# Patient Record
Sex: FEMALE | Race: WHITE | NOT HISPANIC OR LATINO | Employment: OTHER | URBAN - METROPOLITAN AREA
[De-identification: names, ages, dates, MRNs, and addresses within clinical notes are randomized per-mention and may not be internally consistent; named-entity substitution may affect disease eponyms.]

---

## 2018-02-13 ENCOUNTER — APPOINTMENT (OUTPATIENT)
Dept: GENERAL RADIOLOGY | Facility: HOSPITAL | Age: 78
End: 2018-02-13

## 2018-02-13 ENCOUNTER — HOSPITAL ENCOUNTER (EMERGENCY)
Facility: HOSPITAL | Age: 78
Discharge: HOME | End: 2018-02-13
Attending: EMERGENCY MEDICINE | Admitting: EMERGENCY MEDICINE

## 2018-02-13 ENCOUNTER — APPOINTMENT (OUTPATIENT)
Dept: CT IMAGING | Facility: HOSPITAL | Age: 78
End: 2018-02-13
Attending: EMERGENCY MEDICINE

## 2018-02-13 VITALS
SYSTOLIC BLOOD PRESSURE: 163 MMHG | DIASTOLIC BLOOD PRESSURE: 95 MMHG | HEIGHT: 66 IN | TEMPERATURE: 97.7 F | OXYGEN SATURATION: 94 % | BODY MASS INDEX: 28.59 KG/M2 | WEIGHT: 177.9 LBS | RESPIRATION RATE: 16 BRPM | HEART RATE: 84 BPM

## 2018-02-13 DIAGNOSIS — R01.1 HEART MURMUR: ICD-10-CM

## 2018-02-13 DIAGNOSIS — R42 DIZZY: Primary | ICD-10-CM

## 2018-02-13 DIAGNOSIS — N39.0 ACUTE UTI: ICD-10-CM

## 2018-02-13 DIAGNOSIS — I10 ESSENTIAL HYPERTENSION: ICD-10-CM

## 2018-02-13 LAB
ALBUMIN SERPL-MCNC: 3.9 G/DL (ref 3.5–5.2)
ALBUMIN/GLOB SERPL: 1.3 G/DL
ALP SERPL-CCNC: 78 U/L (ref 40–129)
ALT SERPL W P-5'-P-CCNC: 65 U/L (ref 5–33)
ANION GAP SERPL CALCULATED.3IONS-SCNC: 14.4 MMOL/L
AST SERPL-CCNC: 57 U/L (ref 5–32)
BACTERIA UR QL AUTO: ABNORMAL /HPF
BASOPHILS # BLD AUTO: 0.04 10*3/MM3 (ref 0–0.2)
BASOPHILS NFR BLD AUTO: 1.1 % (ref 0–2)
BILIRUB SERPL-MCNC: 0.7 MG/DL (ref 0.2–1.2)
BILIRUB UR QL STRIP: NEGATIVE
BUN BLD-MCNC: 12 MG/DL (ref 8–23)
BUN/CREAT SERPL: 16.9 (ref 7–25)
CALCIUM SPEC-SCNC: 9.1 MG/DL (ref 8.8–10.5)
CHLORIDE SERPL-SCNC: 101 MMOL/L (ref 98–107)
CLARITY UR: CLEAR
CO2 SERPL-SCNC: 23.6 MMOL/L (ref 22–29)
COLOR UR: YELLOW
CREAT BLD-MCNC: 0.71 MG/DL (ref 0.57–1)
DEPRECATED RDW RBC AUTO: 44.1 FL (ref 37–54)
EOSINOPHIL # BLD AUTO: 0.14 10*3/MM3 (ref 0.1–0.3)
EOSINOPHIL NFR BLD AUTO: 3.9 % (ref 0–4)
ERYTHROCYTE [DISTWIDTH] IN BLOOD BY AUTOMATED COUNT: 13.1 % (ref 11.5–14.5)
GFR SERPL CREATININE-BSD FRML MDRD: 80 ML/MIN/1.73
GLOBULIN UR ELPH-MCNC: 3.1 GM/DL
GLUCOSE BLD-MCNC: 103 MG/DL (ref 65–99)
GLUCOSE UR STRIP-MCNC: NEGATIVE MG/DL
HCT VFR BLD AUTO: 42.8 % (ref 37–47)
HGB BLD-MCNC: 14.3 G/DL (ref 12–16)
HGB UR QL STRIP.AUTO: NEGATIVE
HYALINE CASTS UR QL AUTO: ABNORMAL /LPF
IMM GRANULOCYTES # BLD: 0.03 10*3/MM3 (ref 0–0.03)
IMM GRANULOCYTES NFR BLD: 0.8 % (ref 0–0.5)
KETONES UR QL STRIP: NEGATIVE
LEUKOCYTE ESTERASE UR QL STRIP.AUTO: ABNORMAL
LYMPHOCYTES # BLD AUTO: 0.97 10*3/MM3 (ref 0.6–4.8)
LYMPHOCYTES NFR BLD AUTO: 26.7 % (ref 20–45)
MCH RBC QN AUTO: 30.4 PG (ref 27–31)
MCHC RBC AUTO-ENTMCNC: 33.4 G/DL (ref 31–37)
MCV RBC AUTO: 91.1 FL (ref 81–99)
MONOCYTES # BLD AUTO: 0.35 10*3/MM3 (ref 0–1)
MONOCYTES NFR BLD AUTO: 9.6 % (ref 3–8)
NEUTROPHILS # BLD AUTO: 2.1 10*3/MM3 (ref 1.5–8.3)
NEUTROPHILS NFR BLD AUTO: 57.9 % (ref 45–70)
NITRITE UR QL STRIP: NEGATIVE
NRBC BLD MANUAL-RTO: 0 /100 WBC (ref 0–0)
PH UR STRIP.AUTO: 7 [PH] (ref 4.5–8)
PLATELET # BLD AUTO: 93 10*3/MM3 (ref 140–500)
PMV BLD AUTO: 11.8 FL (ref 7.4–10.4)
POTASSIUM BLD-SCNC: 3.7 MMOL/L (ref 3.5–5.2)
PROT SERPL-MCNC: 7 G/DL (ref 6–8.5)
PROT UR QL STRIP: NEGATIVE
RBC # BLD AUTO: 4.7 10*6/MM3 (ref 4.2–5.4)
RBC # UR: ABNORMAL /HPF
RBC MORPH BLD: NORMAL
REF LAB TEST METHOD: ABNORMAL
SMALL PLATELETS BLD QL SMEAR: NORMAL
SODIUM BLD-SCNC: 139 MMOL/L (ref 136–145)
SP GR UR STRIP: 1.01 (ref 1–1.03)
SQUAMOUS #/AREA URNS HPF: ABNORMAL /HPF
TROPONIN T SERPL-MCNC: <0.01 NG/ML (ref 0–0.03)
UROBILINOGEN UR QL STRIP: ABNORMAL
WBC MORPH BLD: NORMAL
WBC NRBC COR # BLD: 3.63 10*3/MM3 (ref 4.8–10.8)
WBC UR QL AUTO: ABNORMAL /HPF

## 2018-02-13 PROCEDURE — 81001 URINALYSIS AUTO W/SCOPE: CPT | Performed by: EMERGENCY MEDICINE

## 2018-02-13 PROCEDURE — 85025 COMPLETE CBC W/AUTO DIFF WBC: CPT | Performed by: EMERGENCY MEDICINE

## 2018-02-13 PROCEDURE — 99284 EMERGENCY DEPT VISIT MOD MDM: CPT

## 2018-02-13 PROCEDURE — 87086 URINE CULTURE/COLONY COUNT: CPT | Performed by: EMERGENCY MEDICINE

## 2018-02-13 PROCEDURE — 84484 ASSAY OF TROPONIN QUANT: CPT | Performed by: EMERGENCY MEDICINE

## 2018-02-13 PROCEDURE — 85007 BL SMEAR W/DIFF WBC COUNT: CPT | Performed by: EMERGENCY MEDICINE

## 2018-02-13 PROCEDURE — 93005 ELECTROCARDIOGRAM TRACING: CPT | Performed by: EMERGENCY MEDICINE

## 2018-02-13 PROCEDURE — 80053 COMPREHEN METABOLIC PANEL: CPT | Performed by: EMERGENCY MEDICINE

## 2018-02-13 PROCEDURE — 70450 CT HEAD/BRAIN W/O DYE: CPT

## 2018-02-13 PROCEDURE — 99284 EMERGENCY DEPT VISIT MOD MDM: CPT | Performed by: EMERGENCY MEDICINE

## 2018-02-13 PROCEDURE — 71045 X-RAY EXAM CHEST 1 VIEW: CPT

## 2018-02-13 PROCEDURE — 93010 ELECTROCARDIOGRAM REPORT: CPT | Performed by: INTERNAL MEDICINE

## 2018-02-13 RX ORDER — CLONIDINE HYDROCHLORIDE 0.1 MG/1
0.1 TABLET ORAL 2 TIMES DAILY
COMMUNITY
End: 2018-04-03

## 2018-02-13 RX ORDER — AMLODIPINE BESYLATE 10 MG/1
5 TABLET ORAL DAILY
COMMUNITY

## 2018-02-13 RX ORDER — VALSARTAN 80 MG/1
160 TABLET ORAL
Status: DISCONTINUED | OUTPATIENT
Start: 2018-02-13 | End: 2018-02-13

## 2018-02-13 RX ORDER — VALSARTAN 160 MG/1
320 TABLET ORAL DAILY
COMMUNITY
End: 2018-04-03 | Stop reason: DRUGHIGH

## 2018-02-13 RX ORDER — CLONIDINE HYDROCHLORIDE 0.1 MG/1
0.1 TABLET ORAL ONCE
Status: COMPLETED | OUTPATIENT
Start: 2018-02-13 | End: 2018-02-13

## 2018-02-13 RX ORDER — VALSARTAN 80 MG/1
320 TABLET ORAL
Status: DISCONTINUED | OUTPATIENT
Start: 2018-02-13 | End: 2018-02-13 | Stop reason: HOSPADM

## 2018-02-13 RX ORDER — CEFUROXIME AXETIL 250 MG/1
250 TABLET ORAL 2 TIMES DAILY
Qty: 10 TABLET | Refills: 0 | Status: SHIPPED | OUTPATIENT
Start: 2018-02-13 | End: 2019-02-17

## 2018-02-13 RX ORDER — CEFUROXIME AXETIL 250 MG/1
250 TABLET ORAL ONCE
Status: COMPLETED | OUTPATIENT
Start: 2018-02-13 | End: 2018-02-13

## 2018-02-13 RX ORDER — AMLODIPINE BESYLATE 5 MG/1
10 TABLET ORAL
Status: DISCONTINUED | OUTPATIENT
Start: 2018-02-13 | End: 2018-02-13 | Stop reason: HOSPADM

## 2018-02-13 RX ADMIN — CEFUROXIME AXETIL 250 MG: 250 TABLET ORAL at 10:29

## 2018-02-13 RX ADMIN — CLONIDINE HYDROCHLORIDE 0.1 MG: 0.1 TABLET ORAL at 09:08

## 2018-02-13 RX ADMIN — AMLODIPINE BESYLATE 10 MG: 5 TABLET ORAL at 09:12

## 2018-02-13 NOTE — ED NOTES
Pt asked is she can take her DIOVAN from home.  Dr Marcelo confirmed that she can.  Provided water     Abby Boss RN  02/13/18 7287

## 2018-02-13 NOTE — DISCHARGE INSTRUCTIONS
Follow-up with Dr. De lValle within one week.  Return to emergency department if there is headache, increased dizziness, difficulty swallowing or speaking, chest pain, shortness of breath, abdominal pain, numbness, tingling, weakness, change in bladder or bowel function, fever, chills, worse in any way at all.

## 2018-02-15 LAB — BACTERIA SPEC AEROBE CULT: NO GROWTH

## 2018-03-13 ENCOUNTER — TRANSCRIBE ORDERS (OUTPATIENT)
Dept: ADMINISTRATIVE | Facility: HOSPITAL | Age: 78
End: 2018-03-13

## 2018-03-13 DIAGNOSIS — R01.1 MURMUR: Primary | ICD-10-CM

## 2018-03-19 ENCOUNTER — HOSPITAL ENCOUNTER (OUTPATIENT)
Dept: CARDIOLOGY | Facility: HOSPITAL | Age: 78
Discharge: HOME | End: 2018-03-19
Admitting: FAMILY MEDICINE

## 2018-03-19 VITALS
WEIGHT: 175 LBS | SYSTOLIC BLOOD PRESSURE: 192 MMHG | HEART RATE: 76 BPM | OXYGEN SATURATION: 94 % | HEIGHT: 64 IN | BODY MASS INDEX: 29.88 KG/M2 | DIASTOLIC BLOOD PRESSURE: 89 MMHG

## 2018-03-19 DIAGNOSIS — R01.1 MURMUR: ICD-10-CM

## 2018-03-19 LAB
AORTIC DIMENSIONLESS INDEX: 0.8 (DI)
BH CV ECHO MEAS - ACS: 1.9 CM
BH CV ECHO MEAS - AO MAX PG (FULL): 3.9 MMHG
BH CV ECHO MEAS - AO MAX PG: 11.8 MMHG
BH CV ECHO MEAS - AO MEAN PG (FULL): 3 MMHG
BH CV ECHO MEAS - AO MEAN PG: 6 MMHG
BH CV ECHO MEAS - AO ROOT AREA (BSA CORRECTED): 1.7
BH CV ECHO MEAS - AO ROOT AREA: 7.5 CM^2
BH CV ECHO MEAS - AO ROOT DIAM: 3.1 CM
BH CV ECHO MEAS - AO V2 MAX: 172 CM/SEC
BH CV ECHO MEAS - AO V2 MEAN: 118 CM/SEC
BH CV ECHO MEAS - AO V2 VTI: 34.5 CM
BH CV ECHO MEAS - ASC AORTA: 3.8 CM
BH CV ECHO MEAS - AVA(I,A): 2.2 CM^2
BH CV ECHO MEAS - AVA(I,D): 2.2 CM^2
BH CV ECHO MEAS - AVA(V,A): 2.3 CM^2
BH CV ECHO MEAS - AVA(V,D): 2.3 CM^2
BH CV ECHO MEAS - BSA(HAYCOCK): 1.9 M^2
BH CV ECHO MEAS - BSA: 1.8 M^2
BH CV ECHO MEAS - BZI_BMI: 30 KILOGRAMS/M^2
BH CV ECHO MEAS - BZI_METRIC_HEIGHT: 162.6 CM
BH CV ECHO MEAS - BZI_METRIC_WEIGHT: 79.4 KG
BH CV ECHO MEAS - CONTRAST EF (2CH): 74.8 ML/M^2
BH CV ECHO MEAS - CONTRAST EF 4CH: 73.1 ML/M^2
BH CV ECHO MEAS - EDV(CUBED): 105.2 ML
BH CV ECHO MEAS - EDV(MOD-SP2): 74.9 ML
BH CV ECHO MEAS - EDV(MOD-SP4): 71 ML
BH CV ECHO MEAS - EDV(TEICH): 103.4 ML
BH CV ECHO MEAS - EF(CUBED): 60.3 %
BH CV ECHO MEAS - EF(MOD-SP2): 74.8 %
BH CV ECHO MEAS - EF(MOD-SP4): 73.1 %
BH CV ECHO MEAS - EF(TEICH): 51.8 %
BH CV ECHO MEAS - ESV(CUBED): 41.8 ML
BH CV ECHO MEAS - ESV(MOD-SP2): 18.9 ML
BH CV ECHO MEAS - ESV(MOD-SP4): 19.1 ML
BH CV ECHO MEAS - ESV(TEICH): 49.8 ML
BH CV ECHO MEAS - FS: 26.5 %
BH CV ECHO MEAS - IVS/LVPW: 0.97
BH CV ECHO MEAS - IVSD: 0.98 CM
BH CV ECHO MEAS - LA DIMENSION: 3.5 CM
BH CV ECHO MEAS - LA/AO: 1.1
BH CV ECHO MEAS - LAT PEAK E' VEL: 7 CM/SEC
BH CV ECHO MEAS - LV DIASTOLIC VOL/BSA (35-75): 38.4 ML/M^2
BH CV ECHO MEAS - LV MASS(C)D: 164.6 GRAMS
BH CV ECHO MEAS - LV MASS(C)DI: 89.1 GRAMS/M^2
BH CV ECHO MEAS - LV MAX PG: 8 MMHG
BH CV ECHO MEAS - LV MEAN PG: 3 MMHG
BH CV ECHO MEAS - LV SYSTOLIC VOL/BSA (12-30): 10.3 ML/M^2
BH CV ECHO MEAS - LV V1 MAX: 141 CM/SEC
BH CV ECHO MEAS - LV V1 MEAN: 84.8 CM/SEC
BH CV ECHO MEAS - LV V1 VTI: 27.1 CM
BH CV ECHO MEAS - LVIDD: 4.7 CM
BH CV ECHO MEAS - LVIDS: 3.5 CM
BH CV ECHO MEAS - LVLD AP2: 7.7 CM
BH CV ECHO MEAS - LVLD AP4: 7.8 CM
BH CV ECHO MEAS - LVLS AP2: 6.2 CM
BH CV ECHO MEAS - LVLS AP4: 6.8 CM
BH CV ECHO MEAS - LVOT AREA (M): 2.8 CM^2
BH CV ECHO MEAS - LVOT AREA: 2.8 CM^2
BH CV ECHO MEAS - LVOT DIAM: 1.9 CM
BH CV ECHO MEAS - LVPWD: 1 CM
BH CV ECHO MEAS - MED PEAK E' VEL: 6 CM/SEC
BH CV ECHO MEAS - MV A DUR: 0.17 SEC
BH CV ECHO MEAS - MV A MAX VEL: 97.5 CM/SEC
BH CV ECHO MEAS - MV DEC SLOPE: 473 CM/SEC^2
BH CV ECHO MEAS - MV DEC TIME: 0.41 SEC
BH CV ECHO MEAS - MV E MAX VEL: 66 CM/SEC
BH CV ECHO MEAS - MV E/A: 0.68
BH CV ECHO MEAS - MV MAX PG: 8 MMHG
BH CV ECHO MEAS - MV MEAN PG: 2 MMHG
BH CV ECHO MEAS - MV P1/2T MAX VEL: 113 CM/SEC
BH CV ECHO MEAS - MV P1/2T: 70 MSEC
BH CV ECHO MEAS - MV V2 MAX: 141 CM/SEC
BH CV ECHO MEAS - MV V2 MEAN: 69.4 CM/SEC
BH CV ECHO MEAS - MV V2 VTI: 34.9 CM
BH CV ECHO MEAS - MVA P1/2T LCG: 1.9 CM^2
BH CV ECHO MEAS - MVA(P1/2T): 3.1 CM^2
BH CV ECHO MEAS - MVA(VTI): 2.2 CM^2
BH CV ECHO MEAS - PA ACC TIME: 0.11 SEC
BH CV ECHO MEAS - PA MAX PG (FULL): 3 MMHG
BH CV ECHO MEAS - PA MAX PG: 7.4 MMHG
BH CV ECHO MEAS - PA PR(ACCEL): 30 MMHG
BH CV ECHO MEAS - PA V2 MAX: 136 CM/SEC
BH CV ECHO MEAS - PULM A REVS DUR: 0.14 SEC
BH CV ECHO MEAS - PULM A REVS VEL: 69.1 CM/SEC
BH CV ECHO MEAS - PULM DIAS VEL: 42.6 CM/SEC
BH CV ECHO MEAS - PULM S/D: 1.3
BH CV ECHO MEAS - PULM SYS VEL: 54.5 CM/SEC
BH CV ECHO MEAS - PVA(V,A): 2.2 CM^2
BH CV ECHO MEAS - PVA(V,D): 2.2 CM^2
BH CV ECHO MEAS - QP/QS: 0.67
BH CV ECHO MEAS - RAP SYSTOLE: 3 MMHG
BH CV ECHO MEAS - RV MAX PG: 4.4 MMHG
BH CV ECHO MEAS - RV MEAN PG: 2 MMHG
BH CV ECHO MEAS - RV V1 MAX: 105 CM/SEC
BH CV ECHO MEAS - RV V1 MEAN: 57.2 CM/SEC
BH CV ECHO MEAS - RV V1 VTI: 18.1 CM
BH CV ECHO MEAS - RVOT AREA: 2.8 CM^2
BH CV ECHO MEAS - RVOT DIAM: 1.9 CM
BH CV ECHO MEAS - RVSP: 35.7 MMHG
BH CV ECHO MEAS - SI(AO): 140.9 ML/M^2
BH CV ECHO MEAS - SI(CUBED): 34.3 ML/M^2
BH CV ECHO MEAS - SI(LVOT): 41.6 ML/M^2
BH CV ECHO MEAS - SI(MOD-SP2): 30.3 ML/M^2
BH CV ECHO MEAS - SI(MOD-SP4): 28.1 ML/M^2
BH CV ECHO MEAS - SI(TEICH): 29 ML/M^2
BH CV ECHO MEAS - SV(AO): 260.4 ML
BH CV ECHO MEAS - SV(CUBED): 63.4 ML
BH CV ECHO MEAS - SV(LVOT): 76.8 ML
BH CV ECHO MEAS - SV(MOD-SP2): 56 ML
BH CV ECHO MEAS - SV(MOD-SP4): 51.9 ML
BH CV ECHO MEAS - SV(RVOT): 51.3 ML
BH CV ECHO MEAS - SV(TEICH): 53.6 ML
BH CV ECHO MEAS - TAPSE (>1.6): 2 CM2
BH CV ECHO MEAS - TR MAX VEL: 286 CM/SEC
BH CV VAS BP LEFT ARM: NORMAL MMHG
BH CV XLRA - RV BASE: 3.2 CM
BH CV XLRA - TDI S': 13 CM/SEC
E/E' RATIO: 11
LEFT ATRIUM VOLUME INDEX: 26 ML/M2
LEFT ATRIUM VOLUME: 45 CM3
LV EF 2D ECHO EST: 73 %
MAXIMAL PREDICTED HEART RATE: 143 BPM
STRESS TARGET HR: 122 BPM

## 2018-03-19 PROCEDURE — 93306 TTE W/DOPPLER COMPLETE: CPT | Performed by: INTERNAL MEDICINE

## 2018-03-19 PROCEDURE — 93306 TTE W/DOPPLER COMPLETE: CPT

## 2018-04-02 ENCOUNTER — TRANSCRIBE ORDERS (OUTPATIENT)
Dept: ADMINISTRATIVE | Facility: HOSPITAL | Age: 78
End: 2018-04-02

## 2018-04-02 DIAGNOSIS — R93.0 ABNORMAL HEAD CT: Primary | ICD-10-CM

## 2018-04-04 ENCOUNTER — HOSPITAL ENCOUNTER (OUTPATIENT)
Dept: MRI IMAGING | Facility: HOSPITAL | Age: 78
Discharge: HOME | End: 2018-04-04
Admitting: FAMILY MEDICINE

## 2018-04-04 DIAGNOSIS — R93.0 ABNORMAL HEAD CT: ICD-10-CM

## 2018-04-04 PROCEDURE — 70551 MRI BRAIN STEM W/O DYE: CPT

## 2018-04-12 ENCOUNTER — OFFICE VISIT (OUTPATIENT)
Dept: CARDIOLOGY | Facility: CLINIC | Age: 78
End: 2018-04-12

## 2018-04-12 VITALS
SYSTOLIC BLOOD PRESSURE: 150 MMHG | WEIGHT: 176 LBS | DIASTOLIC BLOOD PRESSURE: 78 MMHG | HEIGHT: 61 IN | HEART RATE: 78 BPM | BODY MASS INDEX: 33.23 KG/M2

## 2018-04-12 DIAGNOSIS — R93.1 ABNORMAL ECHOCARDIOGRAM: Primary | ICD-10-CM

## 2018-04-12 DIAGNOSIS — I10 ESSENTIAL HYPERTENSION: ICD-10-CM

## 2018-04-12 PROCEDURE — 99203 OFFICE O/P NEW LOW 30 MIN: CPT | Performed by: INTERNAL MEDICINE

## 2018-04-12 NOTE — PROGRESS NOTES
Subjective:     Encounter Date:04/12/2018      Patient ID: Danuta Batista is a 77 y.o. female.    Chief Complaint:  History of Present Illness    Dear Dr. Del Valle,    I had the pleasure of seeing this patient in the office today for initial evaluation consultation.  I have seen her in the past, but it's been over 3 years ago.  At that time she was being seen for an episode of chest discomfort-stress evaluation was unremarkable.    Patient comes in today because of an abnormal echocardiogram.  She was recently in the emergency room because of uncontrolled hypertension.  CT of the head was performed with a subsequent MRI, both of which demonstrated evidence of lacunar infarct consistent with her uncontrolled hypertension.  She had an echocardiogram performed because of LVH seen on an EKG.  Echocardiogram showed no significant LVH but she did have grade 1 diastolic dysfunction.    Patient denies any chest pain or chest discomfort.  No angina pectoris.  She has generalized fatigue but there is no recent change on that.  No shortness of breath at rest.  No orthopnea or PND.    This patient has no known cardiac history.  This patient has no history of coronary artery disease, congestive heart failure, rheumatic fever, rheumatic heart disease, congenital heart disease or heart murmur.  This patient has never required invasive cardiovascular evaluation.    The following portions of the patient's history were reviewed and updated as appropriate: allergies, current medications, past family history, past medical history, past social history, past surgical history and problem list.    Past Medical History:   Diagnosis Date   • Hyperlipidemia    • Hypertension        History reviewed. No pertinent surgical history.    Social History     Social History   • Marital status:      Spouse name: N/A   • Number of children: N/A   • Years of education: N/A     Occupational History   • Not on file.     Social History Main Topics   •  "Smoking status: Never Smoker   • Smokeless tobacco: Never Used      Comment: caff use   • Alcohol use No   • Drug use: No   • Sexual activity: Defer     Other Topics Concern   • Not on file     Social History Narrative   • No narrative on file       Review of Systems   Constitution: Negative for chills, decreased appetite, fever and night sweats.   HENT: Negative for ear discharge, ear pain, hearing loss, nosebleeds and sore throat.    Eyes: Negative for blurred vision, double vision and pain.   Cardiovascular: Negative for cyanosis.   Respiratory: Negative for hemoptysis and sputum production.    Endocrine: Negative for cold intolerance and heat intolerance.   Hematologic/Lymphatic: Negative for adenopathy.   Skin: Negative for dry skin, itching, nail changes, rash and suspicious lesions.   Musculoskeletal: Negative for arthritis, gout, muscle cramps, muscle weakness, myalgias and neck pain.   Gastrointestinal: Negative for anorexia, bowel incontinence, constipation, diarrhea, dysphagia, hematemesis and jaundice.   Genitourinary: Negative for bladder incontinence, dysuria, flank pain, frequency, hematuria and nocturia.   Neurological: Negative for focal weakness, numbness, paresthesias and seizures.   Psychiatric/Behavioral: Negative for altered mental status, hallucinations, hypervigilance, suicidal ideas and thoughts of violence.   Allergic/Immunologic: Negative for persistent infections.       Procedures       Objective:     Vitals:    04/12/18 1303   BP: 150/78   Pulse: 78   Weight: 79.8 kg (176 lb)   Height: 154.9 cm (61\")         Physical Exam   Constitutional: She is oriented to person, place, and time. She appears well-developed and well-nourished. No distress.   HENT:   Head: Normocephalic and atraumatic.   Nose: Nose normal.   Mouth/Throat: Oropharynx is clear and moist.   Eyes: Conjunctivae and EOM are normal. Pupils are equal, round, and reactive to light. Right eye exhibits no discharge. Left eye " exhibits no discharge.   Neck: Normal range of motion. Neck supple. No tracheal deviation present. No thyromegaly present.   Cardiovascular: Normal rate, regular rhythm, S1 normal, S2 normal and normal pulses.  Exam reveals no S3.    Murmur heard.   Medium-pitched midsystolic murmur is present with a grade of 1/6  at the upper right sternal border radiating to the neck  Pulmonary/Chest: Effort normal and breath sounds normal. No stridor. No respiratory distress. She exhibits no tenderness.   Abdominal: Soft. Bowel sounds are normal. She exhibits no distension and no mass. There is no tenderness. There is no rebound and no guarding.   Musculoskeletal: Normal range of motion. She exhibits no tenderness or deformity.   Lymphadenopathy:     She has no cervical adenopathy.   Neurological: She is alert and oriented to person, place, and time. She has normal reflexes.   Skin: Skin is warm and dry. No rash noted. She is not diaphoretic. No erythema.   Psychiatric: She has a normal mood and affect. Thought content normal.       Lab Review:             Performed        Assessment:          Diagnosis Plan   1. Abnormal echocardiogram     2. Essential hypertension            Plan:       1.  Abnormal echocardiogram-patient's echocardiogram shows grade 1 diastolic dysfunction.  She has no evidence of heart failure.  Continued attention to robust hypertension control is warranted.  She does not meet guideline recommendations for any additional diagnostic testing.  2.  Essential hypertension-patient continues to follow with you closely for her HTN regimen.    We will follow on an as-needed basis.    Thank you very much for allowing us to participate in the care of this pleasant patient.  Please don't hesitate to call if I can be of assistance in any way.      Current Outpatient Prescriptions:   •  amLODIPine (NORVASC) 10 MG tablet, Take 10 mg by mouth Daily., Disp: , Rfl:   •  aspirin 325 MG tablet, Take 325 mg by mouth Daily.,  Disp: , Rfl:   •  cefuroxime (CEFTIN) 250 MG tablet, Take 1 tablet by mouth 2 (Two) Times a Day., Disp: 10 tablet, Rfl: 0  •  CloNIDine (CATAPRES) 0.1 MG tablet, Take  by mouth., Disp: , Rfl:   •  valsartan (DIOVAN) 320 MG tablet, Take 320 mg by mouth Daily., Disp: , Rfl:          EMR Dragon/Transcription disclaimer:    Much of this encounter note is an electronic transcription/translation of spoken language to printed text. The electronic translation of spoken language may permit erroneous, or at times, nonsensical words or phrases to be inadvertently transcribed; Although I have reviewed the note for such errors, some may still exist.

## 2018-06-06 ENCOUNTER — TRANSCRIBE ORDERS (OUTPATIENT)
Dept: ADMINISTRATIVE | Facility: HOSPITAL | Age: 78
End: 2018-06-06

## 2018-06-06 ENCOUNTER — LAB (OUTPATIENT)
Dept: LAB | Facility: HOSPITAL | Age: 78
End: 2018-06-06

## 2018-06-06 DIAGNOSIS — I65.22 STENOSIS OF LEFT CAROTID ARTERY: ICD-10-CM

## 2018-06-06 DIAGNOSIS — I63.81 LACUNAR STROKE (HCC): ICD-10-CM

## 2018-06-06 DIAGNOSIS — I65.22 STENOSIS OF LEFT CAROTID ARTERY: Primary | ICD-10-CM

## 2018-06-06 LAB
ANION GAP SERPL CALCULATED.3IONS-SCNC: 13.3 MMOL/L
BUN BLD-MCNC: 12 MG/DL (ref 8–23)
BUN/CREAT SERPL: 16 (ref 7–25)
CALCIUM SPEC-SCNC: 10.4 MG/DL (ref 8.8–10.5)
CHLORIDE SERPL-SCNC: 104 MMOL/L (ref 98–107)
CO2 SERPL-SCNC: 23.7 MMOL/L (ref 22–29)
CREAT BLD-MCNC: 0.75 MG/DL (ref 0.57–1)
GFR SERPL CREATININE-BSD FRML MDRD: 75 ML/MIN/1.73
GLUCOSE BLD-MCNC: 126 MG/DL (ref 65–99)
POTASSIUM BLD-SCNC: 4 MMOL/L (ref 3.5–5.2)
SODIUM BLD-SCNC: 141 MMOL/L (ref 136–145)

## 2018-06-06 PROCEDURE — 36415 COLL VENOUS BLD VENIPUNCTURE: CPT

## 2018-06-06 PROCEDURE — 80048 BASIC METABOLIC PNL TOTAL CA: CPT

## 2018-06-14 ENCOUNTER — HOSPITAL ENCOUNTER (OUTPATIENT)
Dept: CT IMAGING | Facility: HOSPITAL | Age: 78
Discharge: HOME | End: 2018-06-14

## 2018-06-14 ENCOUNTER — HOSPITAL ENCOUNTER (OUTPATIENT)
Dept: CT IMAGING | Facility: HOSPITAL | Age: 78
Discharge: HOME | End: 2018-06-14
Admitting: SURGERY

## 2018-06-14 DIAGNOSIS — I65.22 STENOSIS OF LEFT CAROTID ARTERY: ICD-10-CM

## 2018-06-14 PROCEDURE — 0 IOPAMIDOL PER 1 ML: Performed by: SURGERY

## 2018-06-14 PROCEDURE — 70496 CT ANGIOGRAPHY HEAD: CPT

## 2018-06-14 PROCEDURE — 70498 CT ANGIOGRAPHY NECK: CPT

## 2018-06-14 RX ADMIN — IOPAMIDOL 100 ML: 755 INJECTION, SOLUTION INTRAVENOUS at 08:30

## 2018-06-20 ENCOUNTER — TELEPHONE (OUTPATIENT)
Dept: CARDIOLOGY | Facility: CLINIC | Age: 78
End: 2018-06-20

## 2018-06-20 NOTE — TELEPHONE ENCOUNTER
Lisa with  is calling for surgery clearance. Pt is having Left Carotid Endarterectomy and left carotid stent. It has not yet been schedule.     Is pt clear?     Lisa #: [phone]  Fax: [phone]    Thanks Jessica

## 2018-08-10 ENCOUNTER — TRANSCRIBE ORDERS (OUTPATIENT)
Dept: ADMINISTRATIVE | Facility: HOSPITAL | Age: 78
End: 2018-08-10

## 2018-08-10 DIAGNOSIS — I65.22 OCCLUSION OF LEFT CAROTID ARTERY: Primary | ICD-10-CM

## 2018-08-14 ENCOUNTER — HOSPITAL ENCOUNTER (OUTPATIENT)
Dept: ULTRASOUND IMAGING | Facility: HOSPITAL | Age: 78
Discharge: HOME | End: 2018-08-14
Admitting: SURGERY

## 2018-08-14 DIAGNOSIS — I65.22 OCCLUSION OF LEFT CAROTID ARTERY: ICD-10-CM

## 2018-08-14 PROCEDURE — 93882 EXTRACRANIAL UNI/LTD STUDY: CPT

## 2018-10-01 ENCOUNTER — LAB (OUTPATIENT)
Dept: LAB | Facility: HOSPITAL | Age: 78
End: 2018-10-01

## 2018-10-01 ENCOUNTER — TRANSCRIBE ORDERS (OUTPATIENT)
Dept: ADMINISTRATIVE | Facility: HOSPITAL | Age: 78
End: 2018-10-01

## 2018-10-01 DIAGNOSIS — I10 ESSENTIAL HYPERTENSION, MALIGNANT: Primary | ICD-10-CM

## 2018-10-01 DIAGNOSIS — R73.01 IMPAIRED FASTING GLUCOSE: ICD-10-CM

## 2018-10-01 DIAGNOSIS — E78.5 HYPERLIPIDEMIA, UNSPECIFIED HYPERLIPIDEMIA TYPE: ICD-10-CM

## 2018-10-01 DIAGNOSIS — I10 ESSENTIAL HYPERTENSION, MALIGNANT: ICD-10-CM

## 2018-10-01 DIAGNOSIS — D69.6 THROMBOCYTOPENIA, UNSPECIFIED (HCC): ICD-10-CM

## 2018-10-01 LAB
ALBUMIN SERPL-MCNC: 4.3 G/DL (ref 3.5–5.2)
ALBUMIN/GLOB SERPL: 1.4 G/DL
ALP SERPL-CCNC: 103 U/L (ref 40–129)
ALT SERPL W P-5'-P-CCNC: 39 U/L (ref 5–33)
ANION GAP SERPL CALCULATED.3IONS-SCNC: 13.7 MMOL/L
AST SERPL-CCNC: 26 U/L (ref 5–32)
BASOPHILS # BLD AUTO: 0.08 10*3/MM3 (ref 0–0.2)
BASOPHILS NFR BLD AUTO: 1.7 % (ref 0–2)
BILIRUB SERPL-MCNC: 0.6 MG/DL (ref 0.2–1.2)
BUN BLD-MCNC: 13 MG/DL (ref 8–23)
BUN/CREAT SERPL: 16.5 (ref 7–25)
CALCIUM SPEC-SCNC: 9.3 MG/DL (ref 8.8–10.5)
CHLORIDE SERPL-SCNC: 103 MMOL/L (ref 98–107)
CHOLEST SERPL-MCNC: 133 MG/DL (ref 0–200)
CO2 SERPL-SCNC: 24.3 MMOL/L (ref 22–29)
CREAT BLD-MCNC: 0.79 MG/DL (ref 0.57–1)
DEPRECATED RDW RBC AUTO: 45.1 FL (ref 37–54)
EOSINOPHIL # BLD AUTO: 0.25 10*3/MM3 (ref 0.1–0.3)
EOSINOPHIL NFR BLD AUTO: 5.3 % (ref 0–4)
ERYTHROCYTE [DISTWIDTH] IN BLOOD BY AUTOMATED COUNT: 13.2 % (ref 11.5–14.5)
GFR SERPL CREATININE-BSD FRML MDRD: 70 ML/MIN/1.73
GLOBULIN UR ELPH-MCNC: 3 GM/DL
GLUCOSE BLD-MCNC: 142 MG/DL (ref 65–99)
HBA1C MFR BLD: 6.7 % (ref 4.8–5.6)
HCT VFR BLD AUTO: 46.2 % (ref 37–47)
HDLC SERPL-MCNC: 28 MG/DL (ref 40–60)
HGB BLD-MCNC: 14.6 G/DL (ref 12–16)
IMM GRANULOCYTES # BLD: 0.01 10*3/MM3 (ref 0–0.03)
IMM GRANULOCYTES NFR BLD: 0.2 % (ref 0–0.5)
LDLC SERPL CALC-MCNC: 46 MG/DL (ref 0–100)
LDLC/HDLC SERPL: 1.63 {RATIO}
LYMPHOCYTES # BLD AUTO: 1.13 10*3/MM3 (ref 0.6–4.8)
LYMPHOCYTES NFR BLD AUTO: 24 % (ref 20–45)
MCH RBC QN AUTO: 29.3 PG (ref 27–31)
MCHC RBC AUTO-ENTMCNC: 31.6 G/DL (ref 31–37)
MCV RBC AUTO: 92.8 FL (ref 81–99)
MONOCYTES # BLD AUTO: 0.43 10*3/MM3 (ref 0–1)
MONOCYTES NFR BLD AUTO: 9.1 % (ref 3–8)
NEUTROPHILS # BLD AUTO: 2.8 10*3/MM3 (ref 1.5–8.3)
NEUTROPHILS NFR BLD AUTO: 59.7 % (ref 45–70)
NRBC BLD MANUAL-RTO: 0 /100 WBC (ref 0–0)
PLATELET # BLD AUTO: 105 10*3/MM3 (ref 140–500)
PMV BLD AUTO: 11.1 FL (ref 7.4–10.4)
POTASSIUM BLD-SCNC: 4.3 MMOL/L (ref 3.5–5.2)
PROT SERPL-MCNC: 7.3 G/DL (ref 6–8.5)
RBC # BLD AUTO: 4.98 10*6/MM3 (ref 4.2–5.4)
SODIUM BLD-SCNC: 141 MMOL/L (ref 136–145)
TRIGL SERPL-MCNC: 297 MG/DL (ref 0–150)
VLDLC SERPL-MCNC: 59.4 MG/DL (ref 7–27)
WBC NRBC COR # BLD: 4.7 10*3/MM3 (ref 4.8–10.8)

## 2018-10-01 PROCEDURE — 80053 COMPREHEN METABOLIC PANEL: CPT

## 2018-10-01 PROCEDURE — 36415 COLL VENOUS BLD VENIPUNCTURE: CPT

## 2018-10-01 PROCEDURE — 80061 LIPID PANEL: CPT

## 2018-10-01 PROCEDURE — 83036 HEMOGLOBIN GLYCOSYLATED A1C: CPT

## 2018-10-01 PROCEDURE — 85025 COMPLETE CBC W/AUTO DIFF WBC: CPT

## 2018-10-17 ENCOUNTER — TRANSCRIBE ORDERS (OUTPATIENT)
Dept: ADMINISTRATIVE | Facility: HOSPITAL | Age: 78
End: 2018-10-17

## 2018-10-17 DIAGNOSIS — Z12.39 SCREENING BREAST EXAMINATION: Primary | ICD-10-CM

## 2018-10-26 ENCOUNTER — HOSPITAL ENCOUNTER (OUTPATIENT)
Dept: MAMMOGRAPHY | Facility: HOSPITAL | Age: 78
Discharge: HOME | End: 2018-10-26
Admitting: PHYSICIAN ASSISTANT

## 2018-10-26 DIAGNOSIS — Z12.39 SCREENING BREAST EXAMINATION: ICD-10-CM

## 2018-10-26 PROCEDURE — 77063 BREAST TOMOSYNTHESIS BI: CPT

## 2018-10-26 PROCEDURE — 77067 SCR MAMMO BI INCL CAD: CPT

## 2019-02-17 ENCOUNTER — HOSPITAL ENCOUNTER (EMERGENCY)
Facility: HOSPITAL | Age: 79
Discharge: HOME | End: 2019-02-18
Attending: EMERGENCY MEDICINE | Admitting: EMERGENCY MEDICINE

## 2019-02-17 ENCOUNTER — APPOINTMENT (OUTPATIENT)
Dept: CT IMAGING | Facility: HOSPITAL | Age: 79
End: 2019-02-17

## 2019-02-17 DIAGNOSIS — N20.0 KIDNEY STONE ON LEFT SIDE: Primary | ICD-10-CM

## 2019-02-17 DIAGNOSIS — K80.20 ASYMPTOMATIC GALLSTONES: ICD-10-CM

## 2019-02-17 LAB
ALBUMIN SERPL-MCNC: 4.5 G/DL (ref 3.5–5.2)
ALBUMIN/GLOB SERPL: 1.3 G/DL
ALP SERPL-CCNC: 121 U/L (ref 40–129)
ALT SERPL W P-5'-P-CCNC: <5 U/L (ref 5–33)
ANION GAP SERPL CALCULATED.3IONS-SCNC: 11.1 MMOL/L
AST SERPL-CCNC: <5 U/L (ref 5–32)
BACTERIA UR QL AUTO: ABNORMAL /HPF
BASOPHILS # BLD AUTO: 0.05 10*3/MM3 (ref 0–0.2)
BASOPHILS NFR BLD AUTO: 0.7 % (ref 0–1.5)
BILIRUB SERPL-MCNC: 0.4 MG/DL (ref 0.2–1.2)
BILIRUB UR QL STRIP: NEGATIVE
BUN BLD-MCNC: 18 MG/DL (ref 8–23)
BUN/CREAT SERPL: 20.2 (ref 7–25)
CALCIUM SPEC-SCNC: 9.7 MG/DL (ref 8.8–10.5)
CHLORIDE SERPL-SCNC: 100 MMOL/L (ref 98–107)
CLARITY UR: ABNORMAL
CO2 SERPL-SCNC: 24.9 MMOL/L (ref 22–29)
COLOR UR: YELLOW
CREAT BLD-MCNC: 0.89 MG/DL (ref 0.57–1)
DEPRECATED RDW RBC AUTO: 42.8 FL (ref 37–54)
EOSINOPHIL # BLD AUTO: 0.18 10*3/MM3 (ref 0–0.4)
EOSINOPHIL NFR BLD AUTO: 2.6 % (ref 0.3–6.2)
ERYTHROCYTE [DISTWIDTH] IN BLOOD BY AUTOMATED COUNT: 12.9 % (ref 12.3–15.4)
GFR SERPL CREATININE-BSD FRML MDRD: 61 ML/MIN/1.73
GLOBULIN UR ELPH-MCNC: 3.4 GM/DL
GLUCOSE BLD-MCNC: 272 MG/DL (ref 65–99)
GLUCOSE UR STRIP-MCNC: ABNORMAL MG/DL
HCT VFR BLD AUTO: 44.6 % (ref 34–46.6)
HGB BLD-MCNC: 15 G/DL (ref 12–15.9)
HGB UR QL STRIP.AUTO: ABNORMAL
HYALINE CASTS UR QL AUTO: ABNORMAL /LPF
IMM GRANULOCYTES # BLD AUTO: 0.03 10*3/MM3 (ref 0–0.05)
IMM GRANULOCYTES NFR BLD AUTO: 0.4 % (ref 0–0.5)
KETONES UR QL STRIP: NEGATIVE
LEUKOCYTE ESTERASE UR QL STRIP.AUTO: ABNORMAL
LYMPHOCYTES # BLD AUTO: 0.65 10*3/MM3 (ref 0.7–3.1)
LYMPHOCYTES NFR BLD AUTO: 9.4 % (ref 19.6–45.3)
MCH RBC QN AUTO: 30.4 PG (ref 26.6–33)
MCHC RBC AUTO-ENTMCNC: 33.6 G/DL (ref 31.5–35.7)
MCV RBC AUTO: 90.3 FL (ref 79–97)
MONOCYTES # BLD AUTO: 0.56 10*3/MM3 (ref 0.1–0.9)
MONOCYTES NFR BLD AUTO: 8.1 % (ref 5–12)
NEUTROPHILS # BLD AUTO: 5.41 10*3/MM3 (ref 1.4–7)
NEUTROPHILS NFR BLD AUTO: 78.8 % (ref 42.7–76)
NITRITE UR QL STRIP: NEGATIVE
NRBC BLD AUTO-RTO: 0 /100 WBC (ref 0–0)
PH UR STRIP.AUTO: 5.5 [PH] (ref 4.5–8)
PLATELET # BLD AUTO: 118 10*3/MM3 (ref 140–450)
PMV BLD AUTO: 11.5 FL (ref 6–12)
POTASSIUM BLD-SCNC: 4.1 MMOL/L (ref 3.5–5.2)
PROT SERPL-MCNC: 7.9 G/DL (ref 6–8.5)
PROT UR QL STRIP: ABNORMAL
RBC # BLD AUTO: 4.94 10*6/MM3 (ref 3.77–5.28)
RBC # UR: ABNORMAL /HPF
REF LAB TEST METHOD: ABNORMAL
SODIUM BLD-SCNC: 136 MMOL/L (ref 136–145)
SP GR UR STRIP: 1.02 (ref 1–1.03)
SQUAMOUS #/AREA URNS HPF: ABNORMAL /HPF
UROBILINOGEN UR QL STRIP: ABNORMAL
WBC NRBC COR # BLD: 6.88 10*3/MM3 (ref 3.4–10.8)
WBC UR QL AUTO: ABNORMAL /HPF

## 2019-02-17 PROCEDURE — 85025 COMPLETE CBC W/AUTO DIFF WBC: CPT | Performed by: PHYSICIAN ASSISTANT

## 2019-02-17 PROCEDURE — 99284 EMERGENCY DEPT VISIT MOD MDM: CPT | Performed by: EMERGENCY MEDICINE

## 2019-02-17 PROCEDURE — 96375 TX/PRO/DX INJ NEW DRUG ADDON: CPT

## 2019-02-17 PROCEDURE — 25010000002 FENTANYL CITRATE (PF) 100 MCG/2ML SOLUTION

## 2019-02-17 PROCEDURE — 25010000002 ONDANSETRON PER 1 MG: Performed by: EMERGENCY MEDICINE

## 2019-02-17 PROCEDURE — 80053 COMPREHEN METABOLIC PANEL: CPT | Performed by: PHYSICIAN ASSISTANT

## 2019-02-17 PROCEDURE — 81001 URINALYSIS AUTO W/SCOPE: CPT | Performed by: PHYSICIAN ASSISTANT

## 2019-02-17 PROCEDURE — 25010000002 FENTANYL CITRATE (PF) 100 MCG/2ML SOLUTION: Performed by: EMERGENCY MEDICINE

## 2019-02-17 PROCEDURE — 74176 CT ABD & PELVIS W/O CONTRAST: CPT

## 2019-02-17 PROCEDURE — 25010000002 MORPHINE PER 10 MG: Performed by: EMERGENCY MEDICINE

## 2019-02-17 PROCEDURE — 99283 EMERGENCY DEPT VISIT LOW MDM: CPT

## 2019-02-17 PROCEDURE — 96374 THER/PROPH/DIAG INJ IV PUSH: CPT

## 2019-02-17 RX ORDER — SODIUM CHLORIDE 0.9 % (FLUSH) 0.9 %
10 SYRINGE (ML) INJECTION AS NEEDED
Status: DISCONTINUED | OUTPATIENT
Start: 2019-02-17 | End: 2019-02-18 | Stop reason: HOSPADM

## 2019-02-17 RX ORDER — FENTANYL CITRATE 50 UG/ML
25 INJECTION, SOLUTION INTRAMUSCULAR; INTRAVENOUS ONCE
Status: COMPLETED | OUTPATIENT
Start: 2019-02-17 | End: 2019-02-17

## 2019-02-17 RX ORDER — ATORVASTATIN CALCIUM 10 MG/1
10 TABLET, FILM COATED ORAL DAILY
COMMUNITY

## 2019-02-17 RX ORDER — ONDANSETRON 2 MG/ML
4 INJECTION INTRAMUSCULAR; INTRAVENOUS ONCE
Status: COMPLETED | OUTPATIENT
Start: 2019-02-17 | End: 2019-02-17

## 2019-02-17 RX ORDER — CLOPIDOGREL BISULFATE 75 MG/1
75 TABLET ORAL DAILY
COMMUNITY

## 2019-02-17 RX ORDER — FENTANYL CITRATE 50 UG/ML
50 INJECTION, SOLUTION INTRAMUSCULAR; INTRAVENOUS ONCE
Status: COMPLETED | OUTPATIENT
Start: 2019-02-17 | End: 2019-02-18

## 2019-02-17 RX ORDER — FENTANYL CITRATE 50 UG/ML
INJECTION, SOLUTION INTRAMUSCULAR; INTRAVENOUS
Status: COMPLETED
Start: 2019-02-17 | End: 2019-02-17

## 2019-02-17 RX ADMIN — MORPHINE SULFATE 2 MG: 4 INJECTION INTRAVENOUS at 22:44

## 2019-02-17 RX ADMIN — FENTANYL CITRATE 50 MCG: 50 INJECTION, SOLUTION INTRAMUSCULAR; INTRAVENOUS at 23:57

## 2019-02-17 RX ADMIN — ONDANSETRON 4 MG: 2 INJECTION, SOLUTION INTRAMUSCULAR; INTRAVENOUS at 22:44

## 2019-02-17 RX ADMIN — FENTANYL CITRATE 25 MCG: 50 INJECTION, SOLUTION INTRAMUSCULAR; INTRAVENOUS at 23:35

## 2019-02-18 VITALS
RESPIRATION RATE: 20 BRPM | SYSTOLIC BLOOD PRESSURE: 166 MMHG | HEIGHT: 65 IN | DIASTOLIC BLOOD PRESSURE: 82 MMHG | OXYGEN SATURATION: 95 % | HEART RATE: 81 BPM | WEIGHT: 170 LBS | BODY MASS INDEX: 28.32 KG/M2 | TEMPERATURE: 98 F

## 2019-02-18 PROCEDURE — 25010000002 FENTANYL CITRATE (PF) 100 MCG/2ML SOLUTION: Performed by: EMERGENCY MEDICINE

## 2019-02-18 RX ORDER — ONDANSETRON 4 MG/1
4 TABLET, ORALLY DISINTEGRATING ORAL EVERY 6 HOURS PRN
Qty: 25 TABLET | Refills: 0 | Status: SHIPPED | OUTPATIENT
Start: 2019-02-18 | End: 2021-01-22

## 2019-02-18 RX ORDER — OXYCODONE HYDROCHLORIDE AND ACETAMINOPHEN 5; 325 MG/1; MG/1
1 TABLET ORAL EVERY 6 HOURS PRN
Qty: 25 TABLET | Refills: 0 | Status: SHIPPED | OUTPATIENT
Start: 2019-02-18 | End: 2021-01-22

## 2019-02-18 RX ADMIN — FENTANYL CITRATE 50 MCG: 50 INJECTION, SOLUTION INTRAMUSCULAR; INTRAVENOUS at 00:35

## 2019-02-18 NOTE — ED PROVIDER NOTES
Subjective   MsMarianna Batista is a 77 yo WF who presents secondary to left flank pain.  Sudden onset of sharp stabbing left-sided flank pain around 6 PM this evening.  Initially the pain was more flank and lower abdomen.  It has now moved to the left mid back.  Patient reports slight hematuria.  No nausea or vomiting.  No fever or chills.  No recent illness or injury.  Patient states she had a UTI approximately 1 month ago with similar symptoms.  She was seen at a local immediate care center.  They gave her a shot of antibiotics and her pain went away.  No issues since that time prior to 6 PM tonight.  Patient presents for evaluation.        History provided by:  Patient  Flank Pain   Pain location:  L flank  Pain quality: sharp and stabbing    Pain radiation: left mid back-CVA area.  Pain severity:  Moderate  Onset quality:  Sudden  Duration:  4 hours  Timing:  Constant  Progression:  Worsening  Chronicity:  Recurrent  Context: not sick contacts, not suspicious food intake and not trauma    Context comment:  As described above.  Relieved by:  None tried  Worsened by:  Nothing  Associated symptoms: hematuria    Associated symptoms: no anorexia, no chest pain, no chills, no constipation, no diarrhea, no dysuria, no fever, no nausea, no shortness of breath and no vomiting    Risk factors: no alcohol abuse, has not had multiple surgeries, not obese and no recent hospitalization        Review of Systems   Constitutional: Negative for chills and fever.   Respiratory: Negative for shortness of breath.    Cardiovascular: Negative for chest pain.   Gastrointestinal: Negative for anorexia, constipation, diarrhea, nausea and vomiting.   Genitourinary: Positive for flank pain and hematuria. Negative for dysuria.   Musculoskeletal: Positive for back pain.   All other systems reviewed and are negative.      Past Medical History:   Diagnosis Date   • Hyperlipidemia    • Hypertension        Allergies   Allergen Reactions   •  "Penicillins Other (See Comments)     \"Makes me numb\"   • Sulfa Antibiotics Unknown (See Comments)     \"it's been years ago and I don't remember\"   • Ciprofloxacin Anxiety       Past Surgical History:   Procedure Laterality Date   • CAROTID ENDARTERECTOMY      left side       Family History   Problem Relation Age of Onset   • Stroke Father    • Hypertension Sister    • Stroke Sister    • Heart disease Brother 77   • Hypertension Brother    • Hypertension Sister    • Cancer Sister    • Cancer Brother    • Breast cancer Neg Hx        Social History     Socioeconomic History   • Marital status:      Spouse name: Not on file   • Number of children: Not on file   • Years of education: Not on file   • Highest education level: Not on file   Tobacco Use   • Smoking status: Never Smoker   • Smokeless tobacco: Never Used   • Tobacco comment: caff use   Substance and Sexual Activity   • Alcohol use: No   • Drug use: No   • Sexual activity: Defer           Objective   Physical Exam   Constitutional: She is oriented to person, place, and time. She appears well-developed and well-nourished. No distress.   78-year-old white female laying in bed.  Patient appears in good overall health.  She is friendly and cooperative.  Vital signs notable for elevated BP at 195/92.  Otherwise unremarkable.   HENT:   Head: Normocephalic and atraumatic.   Right Ear: External ear normal.   Left Ear: External ear normal.   Nose: Nose normal.   Mouth/Throat: Oropharynx is clear and moist.   Eyes: Conjunctivae and EOM are normal. Pupils are equal, round, and reactive to light.   Neck: Normal range of motion. Neck supple.   Cardiovascular: Normal rate, regular rhythm, normal heart sounds and intact distal pulses. Exam reveals no gallop and no friction rub.   No murmur heard.  Pulmonary/Chest: Effort normal and breath sounds normal.   Abdominal: Soft. Bowel sounds are normal. She exhibits no distension. There is no hepatosplenomegaly. There is no " tenderness. There is CVA tenderness (Left - moderately tender). There is no rigidity, no guarding and no tenderness at McBurney's point. No hernia.   Musculoskeletal: Normal range of motion.   Neurological: She is alert and oriented to person, place, and time. She has normal reflexes.   Skin: Skin is warm and dry. She is not diaphoretic.   Psychiatric: She has a normal mood and affect. Her behavior is normal.   Nursing note and vitals reviewed.      Procedures           ED Course  ED Course as of Feb 18 0033   Sun Feb 17, 2019   2236 Has left flank pain with hematuria.  UA shows too numerous to count RBCs on microscopic.  Symptoms consistent with a kidney stone.  Obtain a noncontrasted CT.  Given patient pain and nausea medicines.  [SS]   2257 CMP notable for blood sugar 272.  Otherwise unremarkable.  Normal renal function.  CBC unremarkable.  [SS]   2328 Improvement of pain with morphine.  Giving fentanyl.  [SS]   2355 Patient reports some improvement of pain after fentanyl.  However she is still uncomfortable.  Given additional dose of fentanyl.  CT shows a 3 mm stone in the left ureter just above the UVJ with mild hydro-.  Patient also has additional stones in bilateral kidneys as well as gallstones.  Discussed at length with patient all results, diagnoses treatment and follow-up.  Will discharge patient home after we have her pain better controlled.  Will prescribe pain medication and antiemetics for home.  Given urology follow-up.  [SS]   Mon Feb 18, 2019   0012 Patient feeling much better.  Will prescribe Percocet and Zofran for home.  [SS]      ED Course User Index  [SS] Rajiv Yi MD      Labs Reviewed   URINALYSIS W/ CULTURE IF INDICATED - Abnormal; Notable for the following components:       Result Value    Appearance, UA Cloudy (*)     Glucose,  mg/dL (Trace) (*)     Blood, UA Large (3+) (*)     Protein, UA Trace (*)     Leuk Esterase, UA Trace (*)     All other components within normal  limits   COMPREHENSIVE METABOLIC PANEL - Abnormal; Notable for the following components:    Glucose 272 (*)     ALT (SGPT) <5 (*)     AST (SGOT) <5 (*)     All other components within normal limits    Narrative:     The MDRD GFR formula is only valid for adults with stable renal function between ages 18 and 70.   CBC WITH AUTO DIFFERENTIAL - Abnormal; Notable for the following components:    Platelets 118 (*)     Neutrophil % 78.8 (*)     Lymphocyte % 9.4 (*)     Lymphocytes, Absolute 0.65 (*)     All other components within normal limits   URINALYSIS, MICROSCOPIC ONLY - Abnormal; Notable for the following components:    RBC, UA Too Numerous to Count (*)     WBC, UA 3-5 (*)     All other components within normal limits   CBC AND DIFFERENTIAL    Narrative:     The following orders were created for panel order CBC & Differential.  Procedure                               Abnormality         Status                     ---------                               -----------         ------                     CBC Auto Differential[[id number]]        Abnormal            Final result                 Please view results for these tests on the individual orders.     My differential diagnosis for abdominal pain includes but is not limited to:  Gastritis, gastroenteritis, peptic ulcer disease, GERD, esophageal perforation, acute appendicitis, mesenteric adenitis, Meckel’s diverticulum, epiploic appendagitis, diverticulitis, colon cancer, ulcerative colitis, Crohn’s disease, intussusception, small bowel obstruction, adhesions, ischemic bowel, perforated viscus, ileus, obstipation, biliary colic, cholecystitis, cholelithiasis, Luke-Paxton Reggie, hepatitis, pancreatitis, common bile duct obstruction, cholangitis, bile leak, splenic trauma, splenic rupture, splenic infarction, splenic abscess, abdominal abscess, ascites, spontaneous bacterial peritonitis, hernia, UTI, cystitis, prostatitis, ureterolithiasis, urinary obstruction, ovarian  cyst, torsion, pregnancy, ectopic pregnancy, PID, pelvic abscess, mittelschmerz, endometriosis, AAA, myocardial infarction, pneumonia, cancer, porphyria, DKA, medications, sickle cell, viral syndrome, zoster              MDM  Number of Diagnoses or Management Options  Asymptomatic gallstones: new and does not require workup  Kidney stone on left side: new and requires workup     Amount and/or Complexity of Data Reviewed  Clinical lab tests: reviewed and ordered  Tests in the radiology section of CPT®: reviewed and ordered    Risk of Complications, Morbidity, and/or Mortality  Presenting problems: moderate  Diagnostic procedures: high  Management options: moderate    Patient Progress  Patient progress: improved        Final diagnoses:   Kidney stone on left side   Asymptomatic gallstones            Rajiv Yi MD  02/18/19 0033

## 2019-02-18 NOTE — DISCHARGE INSTRUCTIONS
Medications as directed.  Plenty of fluids.  Strain your urine and collect stone.  Take specimen to follow-up with urology.  Follow-up with urology as above.  You have an incidental finding of gallstones.  I have included information on gallstones.  However unless they begin causing issues follow-up is not required.  Return to ED for worsening symptoms, uncontrolled pain, development of fever and chills or other signs of systemic infection, medical emergencies.

## 2019-03-12 ENCOUNTER — TRANSCRIBE ORDERS (OUTPATIENT)
Dept: ADMINISTRATIVE | Facility: HOSPITAL | Age: 79
End: 2019-03-12

## 2019-03-12 DIAGNOSIS — I65.23 CAROTID STENOSIS, ASYMPTOMATIC, BILATERAL: Primary | ICD-10-CM

## 2019-03-18 ENCOUNTER — HOSPITAL ENCOUNTER (OUTPATIENT)
Dept: GENERAL RADIOLOGY | Facility: HOSPITAL | Age: 79
Discharge: HOME | End: 2019-03-18
Admitting: UROLOGY

## 2019-03-18 ENCOUNTER — TRANSCRIBE ORDERS (OUTPATIENT)
Dept: ADMINISTRATIVE | Facility: HOSPITAL | Age: 79
End: 2019-03-18

## 2019-03-18 DIAGNOSIS — N20.2 RENAL AND URETERIC CALCULUS: ICD-10-CM

## 2019-03-18 DIAGNOSIS — N20.2 RENAL AND URETERIC CALCULUS: Primary | ICD-10-CM

## 2019-03-18 PROCEDURE — 74018 RADEX ABDOMEN 1 VIEW: CPT

## 2019-03-22 ENCOUNTER — HOSPITAL ENCOUNTER (OUTPATIENT)
Dept: ULTRASOUND IMAGING | Facility: HOSPITAL | Age: 79
Discharge: HOME | End: 2019-03-22
Admitting: SURGERY

## 2019-03-22 DIAGNOSIS — I65.23 CAROTID STENOSIS, ASYMPTOMATIC, BILATERAL: ICD-10-CM

## 2019-03-22 PROCEDURE — 93880 EXTRACRANIAL BILAT STUDY: CPT

## 2019-03-25 ENCOUNTER — TRANSCRIBE ORDERS (OUTPATIENT)
Dept: ADMINISTRATIVE | Facility: HOSPITAL | Age: 79
End: 2019-03-25

## 2019-03-25 DIAGNOSIS — N20.2 CALCULUS OF KIDNEY AND URETER: Primary | ICD-10-CM

## 2019-03-27 ENCOUNTER — TRANSCRIBE ORDERS (OUTPATIENT)
Dept: ADMINISTRATIVE | Facility: HOSPITAL | Age: 79
End: 2019-03-27

## 2019-03-27 DIAGNOSIS — I65.23 BILATERAL CAROTID ARTERY OCCLUSION: Primary | ICD-10-CM

## 2019-04-04 ENCOUNTER — LAB (OUTPATIENT)
Dept: LAB | Facility: HOSPITAL | Age: 79
End: 2019-04-04

## 2019-04-04 ENCOUNTER — TRANSCRIBE ORDERS (OUTPATIENT)
Dept: ADMINISTRATIVE | Facility: HOSPITAL | Age: 79
End: 2019-04-04

## 2019-04-04 DIAGNOSIS — E78.2 MIXED HYPERLIPIDEMIA: ICD-10-CM

## 2019-04-04 DIAGNOSIS — I10 ESSENTIAL HYPERTENSION, MALIGNANT: Primary | ICD-10-CM

## 2019-04-04 DIAGNOSIS — R73.01 IMPAIRED FASTING GLUCOSE: ICD-10-CM

## 2019-04-04 DIAGNOSIS — D69.49 OTHER PRIMARY THROMBOCYTOPENIA (HCC): ICD-10-CM

## 2019-04-04 DIAGNOSIS — I10 ESSENTIAL HYPERTENSION, MALIGNANT: ICD-10-CM

## 2019-04-04 LAB
ALBUMIN SERPL-MCNC: 4.1 G/DL (ref 3.5–5.2)
ALBUMIN/GLOB SERPL: 1.2 G/DL
ALP SERPL-CCNC: 93 U/L (ref 39–117)
ALT SERPL W P-5'-P-CCNC: 38 U/L (ref 1–33)
ANION GAP SERPL CALCULATED.3IONS-SCNC: 13.6 MMOL/L
AST SERPL-CCNC: 23 U/L (ref 1–32)
BASOPHILS # BLD AUTO: 0.06 10*3/MM3 (ref 0–0.2)
BASOPHILS NFR BLD AUTO: 1.3 % (ref 0–1.5)
BILIRUB SERPL-MCNC: 0.7 MG/DL (ref 0.2–1.2)
BUN BLD-MCNC: 13 MG/DL (ref 8–23)
BUN/CREAT SERPL: 16 (ref 7–25)
CALCIUM SPEC-SCNC: 10.2 MG/DL (ref 8.6–10.5)
CHLORIDE SERPL-SCNC: 101 MMOL/L (ref 98–107)
CHOLEST SERPL-MCNC: 130 MG/DL (ref 0–200)
CO2 SERPL-SCNC: 22.4 MMOL/L (ref 22–29)
CREAT BLD-MCNC: 0.81 MG/DL (ref 0.57–1)
DEPRECATED RDW RBC AUTO: 44.4 FL (ref 37–54)
EOSINOPHIL # BLD AUTO: 0.22 10*3/MM3 (ref 0–0.4)
EOSINOPHIL NFR BLD AUTO: 4.7 % (ref 0.3–6.2)
ERYTHROCYTE [DISTWIDTH] IN BLOOD BY AUTOMATED COUNT: 13.1 % (ref 12.3–15.4)
GFR SERPL CREATININE-BSD FRML MDRD: 68 ML/MIN/1.73
GLOBULIN UR ELPH-MCNC: 3.5 GM/DL
GLUCOSE BLD-MCNC: 184 MG/DL (ref 65–99)
HBA1C MFR BLD: 7.76 % (ref 4.8–5.6)
HCT VFR BLD AUTO: 45.8 % (ref 34–46.6)
HDLC SERPL-MCNC: 26 MG/DL (ref 40–60)
HGB BLD-MCNC: 14.7 G/DL (ref 12–15.9)
IMM GRANULOCYTES # BLD AUTO: 0.03 10*3/MM3 (ref 0–0.05)
IMM GRANULOCYTES NFR BLD AUTO: 0.6 % (ref 0–0.5)
LDLC SERPL CALC-MCNC: 54 MG/DL (ref 0–100)
LDLC/HDLC SERPL: 2.08 {RATIO}
LYMPHOCYTES # BLD AUTO: 0.87 10*3/MM3 (ref 0.7–3.1)
LYMPHOCYTES NFR BLD AUTO: 18.5 % (ref 19.6–45.3)
MCH RBC QN AUTO: 29.6 PG (ref 26.6–33)
MCHC RBC AUTO-ENTMCNC: 32.1 G/DL (ref 31.5–35.7)
MCV RBC AUTO: 92.2 FL (ref 79–97)
MONOCYTES # BLD AUTO: 0.46 10*3/MM3 (ref 0.1–0.9)
MONOCYTES NFR BLD AUTO: 9.8 % (ref 5–12)
NEUTROPHILS # BLD AUTO: 3.06 10*3/MM3 (ref 1.4–7)
NEUTROPHILS NFR BLD AUTO: 65.1 % (ref 42.7–76)
NRBC BLD AUTO-RTO: 0 /100 WBC (ref 0–0)
PLATELET # BLD AUTO: 107 10*3/MM3 (ref 140–450)
PMV BLD AUTO: 12 FL (ref 6–12)
POTASSIUM BLD-SCNC: 4 MMOL/L (ref 3.5–5.2)
PROT SERPL-MCNC: 7.6 G/DL (ref 6–8.5)
RBC # BLD AUTO: 4.97 10*6/MM3 (ref 3.77–5.28)
SODIUM BLD-SCNC: 137 MMOL/L (ref 136–145)
TRIGL SERPL-MCNC: 250 MG/DL (ref 0–150)
VLDLC SERPL-MCNC: 50 MG/DL (ref 5–40)
WBC NRBC COR # BLD: 4.7 10*3/MM3 (ref 3.4–10.8)

## 2019-04-04 PROCEDURE — 80053 COMPREHEN METABOLIC PANEL: CPT

## 2019-04-04 PROCEDURE — 80061 LIPID PANEL: CPT

## 2019-04-04 PROCEDURE — 85025 COMPLETE CBC W/AUTO DIFF WBC: CPT

## 2019-04-04 PROCEDURE — 83036 HEMOGLOBIN GLYCOSYLATED A1C: CPT

## 2019-04-04 PROCEDURE — 36415 COLL VENOUS BLD VENIPUNCTURE: CPT

## 2019-06-17 ENCOUNTER — HOSPITAL ENCOUNTER (OUTPATIENT)
Dept: ULTRASOUND IMAGING | Facility: HOSPITAL | Age: 79
Discharge: HOME | End: 2019-06-17
Admitting: UROLOGY

## 2019-06-17 DIAGNOSIS — N20.2 CALCULUS OF KIDNEY AND URETER: ICD-10-CM

## 2019-06-17 PROCEDURE — 76775 US EXAM ABDO BACK WALL LIM: CPT

## 2019-09-04 ENCOUNTER — TRANSCRIBE ORDERS (OUTPATIENT)
Dept: ADMINISTRATIVE | Facility: HOSPITAL | Age: 79
End: 2019-09-04

## 2019-09-04 DIAGNOSIS — N20.0 KIDNEY STONES: Primary | ICD-10-CM

## 2019-10-02 ENCOUNTER — TRANSCRIBE ORDERS (OUTPATIENT)
Dept: ADMINISTRATIVE | Facility: HOSPITAL | Age: 79
End: 2019-10-02

## 2019-10-02 ENCOUNTER — LAB (OUTPATIENT)
Dept: LAB | Facility: HOSPITAL | Age: 79
End: 2019-10-02

## 2019-10-02 DIAGNOSIS — E11.65 TYPE II DIABETES MELLITUS WITH HYPEROSMOLARITY, UNCONTROLLED (HCC): ICD-10-CM

## 2019-10-02 DIAGNOSIS — E78.2 MIXED HYPERLIPIDEMIA: ICD-10-CM

## 2019-10-02 DIAGNOSIS — I10 ESSENTIAL HYPERTENSION, MALIGNANT: ICD-10-CM

## 2019-10-02 DIAGNOSIS — I10 ESSENTIAL HYPERTENSION, MALIGNANT: Primary | ICD-10-CM

## 2019-10-02 DIAGNOSIS — D69.49 OTHER PRIMARY THROMBOCYTOPENIA (HCC): ICD-10-CM

## 2019-10-02 DIAGNOSIS — E11.00 TYPE II DIABETES MELLITUS WITH HYPEROSMOLARITY, UNCONTROLLED (HCC): ICD-10-CM

## 2019-10-02 LAB
ALBUMIN SERPL-MCNC: 4.8 G/DL (ref 3.5–5.2)
ALBUMIN/GLOB SERPL: 1.5 G/DL
ALP SERPL-CCNC: 97 U/L (ref 39–117)
ALT SERPL W P-5'-P-CCNC: 30 U/L (ref 1–33)
ANION GAP SERPL CALCULATED.3IONS-SCNC: 13.3 MMOL/L (ref 5–15)
AST SERPL-CCNC: 24 U/L (ref 1–32)
BASOPHILS # BLD AUTO: 0.05 10*3/MM3 (ref 0–0.2)
BASOPHILS NFR BLD AUTO: 1.1 % (ref 0–1.5)
BILIRUB SERPL-MCNC: 0.7 MG/DL (ref 0.2–1.2)
BUN BLD-MCNC: 15 MG/DL (ref 8–23)
BUN/CREAT SERPL: 17.4 (ref 7–25)
CALCIUM SPEC-SCNC: 10 MG/DL (ref 8.6–10.5)
CHLORIDE SERPL-SCNC: 101 MMOL/L (ref 98–107)
CHOLEST SERPL-MCNC: 132 MG/DL (ref 0–200)
CO2 SERPL-SCNC: 25.7 MMOL/L (ref 22–29)
CREAT BLD-MCNC: 0.86 MG/DL (ref 0.57–1)
DEPRECATED RDW RBC AUTO: 43.1 FL (ref 37–54)
EOSINOPHIL # BLD AUTO: 0.23 10*3/MM3 (ref 0–0.4)
EOSINOPHIL NFR BLD AUTO: 5.2 % (ref 0.3–6.2)
ERYTHROCYTE [DISTWIDTH] IN BLOOD BY AUTOMATED COUNT: 13.1 % (ref 12.3–15.4)
GFR SERPL CREATININE-BSD FRML MDRD: 64 ML/MIN/1.73
GLOBULIN UR ELPH-MCNC: 3.2 GM/DL
GLUCOSE BLD-MCNC: 147 MG/DL (ref 65–99)
HBA1C MFR BLD: 7.21 % (ref 4.8–5.6)
HCT VFR BLD AUTO: 45.7 % (ref 34–46.6)
HDLC SERPL-MCNC: 27 MG/DL (ref 40–60)
HGB BLD-MCNC: 15.2 G/DL (ref 12–15.9)
IMM GRANULOCYTES # BLD AUTO: 0.02 10*3/MM3 (ref 0–0.05)
IMM GRANULOCYTES NFR BLD AUTO: 0.4 % (ref 0–0.5)
LDLC SERPL CALC-MCNC: 58 MG/DL (ref 0–100)
LDLC/HDLC SERPL: 2.15 {RATIO}
LYMPHOCYTES # BLD AUTO: 0.76 10*3/MM3 (ref 0.7–3.1)
LYMPHOCYTES NFR BLD AUTO: 17 % (ref 19.6–45.3)
MCH RBC QN AUTO: 29.9 PG (ref 26.6–33)
MCHC RBC AUTO-ENTMCNC: 33.3 G/DL (ref 31.5–35.7)
MCV RBC AUTO: 90 FL (ref 79–97)
MONOCYTES # BLD AUTO: 0.41 10*3/MM3 (ref 0.1–0.9)
MONOCYTES NFR BLD AUTO: 9.2 % (ref 5–12)
NEUTROPHILS # BLD AUTO: 2.99 10*3/MM3 (ref 1.7–7)
NEUTROPHILS NFR BLD AUTO: 67.1 % (ref 42.7–76)
NRBC BLD AUTO-RTO: 0 /100 WBC (ref 0–0.2)
PLATELET # BLD AUTO: 123 10*3/MM3 (ref 140–450)
PMV BLD AUTO: 11.8 FL (ref 6–12)
POTASSIUM BLD-SCNC: 4.6 MMOL/L (ref 3.5–5.2)
PROT SERPL-MCNC: 8 G/DL (ref 6–8.5)
RBC # BLD AUTO: 5.08 10*6/MM3 (ref 3.77–5.28)
SODIUM BLD-SCNC: 140 MMOL/L (ref 136–145)
TRIGL SERPL-MCNC: 235 MG/DL (ref 0–150)
VLDLC SERPL-MCNC: 47 MG/DL (ref 5–40)
WBC NRBC COR # BLD: 4.46 10*3/MM3 (ref 3.4–10.8)

## 2019-10-02 PROCEDURE — 80061 LIPID PANEL: CPT

## 2019-10-02 PROCEDURE — 83036 HEMOGLOBIN GLYCOSYLATED A1C: CPT

## 2019-10-02 PROCEDURE — 36415 COLL VENOUS BLD VENIPUNCTURE: CPT

## 2019-10-02 PROCEDURE — 80053 COMPREHEN METABOLIC PANEL: CPT

## 2019-10-02 PROCEDURE — 85025 COMPLETE CBC W/AUTO DIFF WBC: CPT

## 2019-10-04 ENCOUNTER — HOSPITAL ENCOUNTER (OUTPATIENT)
Dept: ULTRASOUND IMAGING | Facility: HOSPITAL | Age: 79
Discharge: HOME | End: 2019-10-04
Admitting: SURGERY

## 2019-10-04 DIAGNOSIS — I65.23 BILATERAL CAROTID ARTERY OCCLUSION: ICD-10-CM

## 2019-10-04 PROCEDURE — 93880 EXTRACRANIAL BILAT STUDY: CPT

## 2019-11-14 ENCOUNTER — TRANSCRIBE ORDERS (OUTPATIENT)
Dept: ADMINISTRATIVE | Facility: HOSPITAL | Age: 79
End: 2019-11-14

## 2019-11-14 DIAGNOSIS — Z12.31 SCREENING MAMMOGRAM, ENCOUNTER FOR: Primary | ICD-10-CM

## 2019-11-18 ENCOUNTER — HOSPITAL ENCOUNTER (OUTPATIENT)
Dept: MAMMOGRAPHY | Facility: HOSPITAL | Age: 79
Discharge: HOME | End: 2019-11-18
Admitting: FAMILY MEDICINE

## 2019-11-18 DIAGNOSIS — Z12.31 SCREENING MAMMOGRAM, ENCOUNTER FOR: ICD-10-CM

## 2019-11-18 PROCEDURE — 77063 BREAST TOMOSYNTHESIS BI: CPT

## 2019-11-18 PROCEDURE — 77067 SCR MAMMO BI INCL CAD: CPT

## 2019-12-02 ENCOUNTER — HOSPITAL ENCOUNTER (OUTPATIENT)
Dept: ULTRASOUND IMAGING | Facility: HOSPITAL | Age: 79
Discharge: HOME | End: 2019-12-02
Admitting: UROLOGY

## 2019-12-02 DIAGNOSIS — N20.0 KIDNEY STONES: ICD-10-CM

## 2019-12-02 PROCEDURE — 76775 US EXAM ABDO BACK WALL LIM: CPT

## 2020-06-03 ENCOUNTER — LAB (OUTPATIENT)
Dept: LAB | Facility: HOSPITAL | Age: 80
End: 2020-06-03

## 2020-06-03 ENCOUNTER — TRANSCRIBE ORDERS (OUTPATIENT)
Dept: ADMINISTRATIVE | Facility: HOSPITAL | Age: 80
End: 2020-06-03

## 2020-06-03 DIAGNOSIS — E78.5 HYPERLIPIDEMIA, UNSPECIFIED HYPERLIPIDEMIA TYPE: Primary | ICD-10-CM

## 2020-06-03 DIAGNOSIS — E78.5 HYPERLIPIDEMIA, UNSPECIFIED HYPERLIPIDEMIA TYPE: ICD-10-CM

## 2020-06-03 LAB
ALBUMIN SERPL-MCNC: 4.6 G/DL (ref 3.5–5.2)
ALBUMIN/GLOB SERPL: 1.4 G/DL
ALP SERPL-CCNC: 89 U/L (ref 39–117)
ALT SERPL W P-5'-P-CCNC: 25 U/L (ref 1–33)
ANION GAP SERPL CALCULATED.3IONS-SCNC: 9.9 MMOL/L (ref 5–15)
AST SERPL-CCNC: 20 U/L (ref 1–32)
BASOPHILS # BLD AUTO: 0.07 10*3/MM3 (ref 0–0.2)
BASOPHILS NFR BLD AUTO: 1.5 % (ref 0–1.5)
BILIRUB SERPL-MCNC: 0.6 MG/DL (ref 0.2–1.2)
BUN BLD-MCNC: 14 MG/DL (ref 8–23)
BUN/CREAT SERPL: 15.7 (ref 7–25)
CALCIUM SPEC-SCNC: 10.5 MG/DL (ref 8.6–10.5)
CHLORIDE SERPL-SCNC: 104 MMOL/L (ref 98–107)
CHOLEST SERPL-MCNC: 122 MG/DL (ref 0–200)
CO2 SERPL-SCNC: 27.1 MMOL/L (ref 22–29)
CREAT BLD-MCNC: 0.89 MG/DL (ref 0.57–1)
DEPRECATED RDW RBC AUTO: 47.1 FL (ref 37–54)
EOSINOPHIL # BLD AUTO: 0.25 10*3/MM3 (ref 0–0.4)
EOSINOPHIL NFR BLD AUTO: 5.2 % (ref 0.3–6.2)
ERYTHROCYTE [DISTWIDTH] IN BLOOD BY AUTOMATED COUNT: 13.7 % (ref 12.3–15.4)
GFR SERPL CREATININE-BSD FRML MDRD: 61 ML/MIN/1.73
GLOBULIN UR ELPH-MCNC: 3.3 GM/DL
GLUCOSE BLD-MCNC: 146 MG/DL (ref 65–99)
HBA1C MFR BLD: 7.11 % (ref 4.8–5.6)
HCT VFR BLD AUTO: 45.9 % (ref 34–46.6)
HDLC SERPL-MCNC: 28 MG/DL (ref 40–60)
HGB BLD-MCNC: 14.9 G/DL (ref 12–15.9)
LDLC SERPL CALC-MCNC: 55 MG/DL (ref 0–100)
LDLC/HDLC SERPL: 1.96 {RATIO}
LYMPHOCYTES # BLD AUTO: 0.89 10*3/MM3 (ref 0.7–3.1)
LYMPHOCYTES NFR BLD AUTO: 18.7 % (ref 19.6–45.3)
MCH RBC QN AUTO: 29.5 PG (ref 26.6–33)
MCHC RBC AUTO-ENTMCNC: 32.5 G/DL (ref 31.5–35.7)
MCV RBC AUTO: 90.9 FL (ref 79–97)
MONOCYTES # BLD AUTO: 0.39 10*3/MM3 (ref 0.1–0.9)
MONOCYTES NFR BLD AUTO: 8.2 % (ref 5–12)
NEUTROPHILS # BLD AUTO: 3.15 10*3/MM3 (ref 1.7–7)
NEUTROPHILS NFR BLD AUTO: 66 % (ref 42.7–76)
PLATELET # BLD AUTO: 118 10*3/MM3 (ref 140–450)
PMV BLD AUTO: 11.9 FL (ref 6–12)
POTASSIUM BLD-SCNC: 4.5 MMOL/L (ref 3.5–5.2)
PROT SERPL-MCNC: 7.9 G/DL (ref 6–8.5)
RBC # BLD AUTO: 5.05 10*6/MM3 (ref 3.77–5.28)
SODIUM BLD-SCNC: 141 MMOL/L (ref 136–145)
TRIGL SERPL-MCNC: 196 MG/DL (ref 0–150)
VLDLC SERPL-MCNC: 39.2 MG/DL (ref 5–40)
WBC NRBC COR # BLD: 4.77 10*3/MM3 (ref 3.4–10.8)

## 2020-06-03 PROCEDURE — 80053 COMPREHEN METABOLIC PANEL: CPT

## 2020-06-03 PROCEDURE — 36415 COLL VENOUS BLD VENIPUNCTURE: CPT

## 2020-06-03 PROCEDURE — 83036 HEMOGLOBIN GLYCOSYLATED A1C: CPT

## 2020-06-03 PROCEDURE — 85027 COMPLETE CBC AUTOMATED: CPT

## 2020-06-03 PROCEDURE — 80061 LIPID PANEL: CPT

## 2020-09-02 ENCOUNTER — TRANSCRIBE ORDERS (OUTPATIENT)
Dept: ADMINISTRATIVE | Facility: HOSPITAL | Age: 80
End: 2020-09-02

## 2020-09-02 DIAGNOSIS — I65.23 CAROTID STENOSIS, BILATERAL: Primary | ICD-10-CM

## 2020-09-17 ENCOUNTER — TRANSCRIBE ORDERS (OUTPATIENT)
Dept: ADMINISTRATIVE | Facility: HOSPITAL | Age: 80
End: 2020-09-17

## 2020-09-17 DIAGNOSIS — N20.2 CALCULUS OF KIDNEY AND URETER: Primary | ICD-10-CM

## 2020-09-17 DIAGNOSIS — N20.2 RENAL AND URETERIC CALCULUS: ICD-10-CM

## 2020-09-25 ENCOUNTER — HOSPITAL ENCOUNTER (OUTPATIENT)
Dept: ULTRASOUND IMAGING | Facility: HOSPITAL | Age: 80
Discharge: HOME | End: 2020-09-25
Admitting: UROLOGY

## 2020-09-25 DIAGNOSIS — N20.2 RENAL AND URETERIC CALCULUS: ICD-10-CM

## 2020-09-25 PROCEDURE — 76775 US EXAM ABDO BACK WALL LIM: CPT

## 2020-10-02 ENCOUNTER — TRANSCRIBE ORDERS (OUTPATIENT)
Dept: ADMINISTRATIVE | Facility: HOSPITAL | Age: 80
End: 2020-10-02

## 2020-10-02 DIAGNOSIS — N20.0 KIDNEY STONES: Primary | ICD-10-CM

## 2020-10-05 ENCOUNTER — HOSPITAL ENCOUNTER (OUTPATIENT)
Dept: ULTRASOUND IMAGING | Facility: HOSPITAL | Age: 80
Discharge: HOME | End: 2020-10-05
Admitting: NURSE PRACTITIONER

## 2020-10-05 DIAGNOSIS — I65.23 CAROTID STENOSIS, BILATERAL: ICD-10-CM

## 2020-10-05 PROCEDURE — 93880 EXTRACRANIAL BILAT STUDY: CPT

## 2020-11-23 ENCOUNTER — TRANSCRIBE ORDERS (OUTPATIENT)
Dept: ADMINISTRATIVE | Facility: HOSPITAL | Age: 80
End: 2020-11-23

## 2020-11-23 ENCOUNTER — LAB (OUTPATIENT)
Dept: LAB | Facility: HOSPITAL | Age: 80
End: 2020-11-23

## 2020-11-23 DIAGNOSIS — D69.49 OTHER PRIMARY THROMBOCYTOPENIA (HCC): ICD-10-CM

## 2020-11-23 DIAGNOSIS — E11.9 DIABETES MELLITUS WITHOUT COMPLICATION (HCC): ICD-10-CM

## 2020-11-23 DIAGNOSIS — E78.5 HYPERLIPIDEMIA, UNSPECIFIED HYPERLIPIDEMIA TYPE: ICD-10-CM

## 2020-11-23 DIAGNOSIS — E78.5 HYPERLIPIDEMIA, UNSPECIFIED HYPERLIPIDEMIA TYPE: Primary | ICD-10-CM

## 2020-11-23 LAB
ALBUMIN SERPL-MCNC: 4.8 G/DL (ref 3.5–5.2)
ALBUMIN/GLOB SERPL: 1.6 G/DL
ALP SERPL-CCNC: 93 U/L (ref 39–117)
ALT SERPL W P-5'-P-CCNC: 23 U/L (ref 1–33)
ANION GAP SERPL CALCULATED.3IONS-SCNC: 10.7 MMOL/L (ref 5–15)
AST SERPL-CCNC: 20 U/L (ref 1–32)
BILIRUB SERPL-MCNC: 0.6 MG/DL (ref 0–1.2)
BUN SERPL-MCNC: 13 MG/DL (ref 8–23)
BUN/CREAT SERPL: 18.3 (ref 7–25)
CALCIUM SPEC-SCNC: 10.5 MG/DL (ref 8.6–10.5)
CHLORIDE SERPL-SCNC: 104 MMOL/L (ref 98–107)
CHOLEST SERPL-MCNC: 132 MG/DL (ref 0–200)
CO2 SERPL-SCNC: 28.3 MMOL/L (ref 22–29)
CREAT SERPL-MCNC: 0.71 MG/DL (ref 0.57–1)
DEPRECATED RDW RBC AUTO: 46.2 FL (ref 37–54)
EOSINOPHIL # BLD MANUAL: 0.26 10*3/MM3 (ref 0–0.4)
EOSINOPHIL NFR BLD MANUAL: 6 % (ref 0.3–6.2)
ERYTHROCYTE [DISTWIDTH] IN BLOOD BY AUTOMATED COUNT: 13.9 % (ref 12.3–15.4)
GFR SERPL CREATININE-BSD FRML MDRD: 79 ML/MIN/1.73
GLOBULIN UR ELPH-MCNC: 3 GM/DL
GLUCOSE SERPL-MCNC: 123 MG/DL (ref 65–99)
HBA1C MFR BLD: 6.44 % (ref 4.8–5.6)
HCT VFR BLD AUTO: 46.9 % (ref 34–46.6)
HDLC SERPL-MCNC: 32 MG/DL (ref 40–60)
HGB BLD-MCNC: 15 G/DL (ref 12–15.9)
LDLC SERPL CALC-MCNC: 74 MG/DL (ref 0–100)
LDLC/HDLC SERPL: 2.22 {RATIO}
LYMPHOCYTES # BLD MANUAL: 0.83 10*3/MM3 (ref 0.7–3.1)
LYMPHOCYTES NFR BLD MANUAL: 19 % (ref 19.6–45.3)
LYMPHOCYTES NFR BLD MANUAL: 6 % (ref 5–12)
MCH RBC QN AUTO: 29 PG (ref 26.6–33)
MCHC RBC AUTO-ENTMCNC: 32 G/DL (ref 31.5–35.7)
MCV RBC AUTO: 90.7 FL (ref 79–97)
MONOCYTES # BLD AUTO: 0.26 10*3/MM3 (ref 0.1–0.9)
NEUTROPHILS # BLD AUTO: 3.01 10*3/MM3 (ref 1.7–7)
NEUTROPHILS NFR BLD MANUAL: 69 % (ref 42.7–76)
PLAT MORPH BLD: NORMAL
PLATELET # BLD AUTO: 128 10*3/MM3 (ref 140–450)
PMV BLD AUTO: 11.4 FL (ref 6–12)
POTASSIUM SERPL-SCNC: 4.3 MMOL/L (ref 3.5–5.2)
PROT SERPL-MCNC: 7.8 G/DL (ref 6–8.5)
RBC # BLD AUTO: 5.17 10*6/MM3 (ref 3.77–5.28)
RBC MORPH BLD: NORMAL
SODIUM SERPL-SCNC: 143 MMOL/L (ref 136–145)
TRIGL SERPL-MCNC: 145 MG/DL (ref 0–150)
VLDLC SERPL-MCNC: 26 MG/DL (ref 5–40)
WBC # BLD AUTO: 4.36 10*3/MM3 (ref 3.4–10.8)
WBC MORPH BLD: NORMAL

## 2020-11-23 PROCEDURE — 85007 BL SMEAR W/DIFF WBC COUNT: CPT

## 2020-11-23 PROCEDURE — 85027 COMPLETE CBC AUTOMATED: CPT

## 2020-11-23 PROCEDURE — 36415 COLL VENOUS BLD VENIPUNCTURE: CPT

## 2020-11-23 PROCEDURE — 80061 LIPID PANEL: CPT

## 2020-11-23 PROCEDURE — 83036 HEMOGLOBIN GLYCOSYLATED A1C: CPT

## 2020-11-23 PROCEDURE — 80053 COMPREHEN METABOLIC PANEL: CPT

## 2021-01-22 ENCOUNTER — OFFICE VISIT (OUTPATIENT)
Dept: ORTHOPEDIC SURGERY | Facility: CLINIC | Age: 81
End: 2021-01-22

## 2021-01-22 VITALS
WEIGHT: 170 LBS | DIASTOLIC BLOOD PRESSURE: 102 MMHG | HEIGHT: 65 IN | SYSTOLIC BLOOD PRESSURE: 190 MMHG | BODY MASS INDEX: 28.32 KG/M2 | HEART RATE: 89 BPM

## 2021-01-22 DIAGNOSIS — R52 PAIN: Primary | ICD-10-CM

## 2021-01-22 DIAGNOSIS — M17.12 PRIMARY OSTEOARTHRITIS OF LEFT KNEE: ICD-10-CM

## 2021-01-22 PROCEDURE — 73562 X-RAY EXAM OF KNEE 3: CPT | Performed by: NURSE PRACTITIONER

## 2021-01-22 PROCEDURE — 99203 OFFICE O/P NEW LOW 30 MIN: CPT | Performed by: NURSE PRACTITIONER

## 2021-01-22 PROCEDURE — 20610 DRAIN/INJ JOINT/BURSA W/O US: CPT | Performed by: NURSE PRACTITIONER

## 2021-01-22 RX ORDER — LIDOCAINE HYDROCHLORIDE 10 MG/ML
8 INJECTION, SOLUTION EPIDURAL; INFILTRATION; INTRACAUDAL; PERINEURAL
Status: COMPLETED | OUTPATIENT
Start: 2021-01-22 | End: 2021-01-22

## 2021-01-22 RX ORDER — TRIAMCINOLONE ACETONIDE 40 MG/ML
80 INJECTION, SUSPENSION INTRA-ARTICULAR; INTRAMUSCULAR
Status: COMPLETED | OUTPATIENT
Start: 2021-01-22 | End: 2021-01-22

## 2021-01-22 RX ORDER — CLONIDINE HYDROCHLORIDE 0.2 MG/1
0.2 TABLET ORAL 2 TIMES DAILY
COMMUNITY
Start: 2021-01-03

## 2021-01-22 RX ORDER — SODIUM BICARBONATE 650 MG/1
650 TABLET ORAL 4 TIMES DAILY
COMMUNITY
Start: 2020-12-04

## 2021-01-22 RX ADMIN — TRIAMCINOLONE ACETONIDE 80 MG: 40 INJECTION, SUSPENSION INTRA-ARTICULAR; INTRAMUSCULAR at 14:23

## 2021-01-22 RX ADMIN — LIDOCAINE HYDROCHLORIDE 8 ML: 10 INJECTION, SOLUTION EPIDURAL; INFILTRATION; INTRACAUDAL; PERINEURAL at 14:23

## 2021-01-22 NOTE — PATIENT INSTRUCTIONS
Post-injection instructions    ? Apply ice to the injection side as needed to relieve pain, 10-15 minutes on and off every few hours.     ? Watch for signs and symptoms of infection, including significantly increased pain, redness, swelling, warmth to the touch, fevers, sweats, chills. If these symptoms occur, please notify our office immediately [phone].    ? Keep the injection site clean. You may remove adhesive bandage once bleeding has stopped.    ? Do not engage in any new or strenuous activities for at least the next 24 hours until soreness has stopped.    ? Some people will receive immediate relief with a steroid injection however it takes several days before the steroid starts working. You may receive 3-4 steroid injections a year, if necessary. If you get no relief with from the injection, we will continue to work with you to explore other treatment options.    ? It will take approximately four weeks after the last (or single) gel injection before you notice symptom relief.    * Some people experience redness or feeling of warmth after receiving corticosteroid injection. If you have diabetes, the corticosteroid injection may temporarily increase your blood sugar levels. Continue to check glucose levels and if not improving, contact your primary care provider for further treatment.   * Please keep mind that this is not a one-size-fits all approach. If you have questions or concerns, contact our office.

## 2021-01-22 NOTE — PROGRESS NOTES
Subjective:     Patient ID: Danuta Batista is a 80 y.o. female.    Chief Complaint:  Left knee pain, new patient to examiner  History of Present Illness  Danuta Batista presents to clinic for evaluation of her left knee.  She presents with daughter with a 20+ day history of pain at the left knee just progressively gotten worse.  Denies known injury denies any prior x-ray imaging of the left knee in the past.  Maximal tenderness present the anterior, medial aspect of the knee.  Rates discomfort a 9-10 out of 10 aching, throbbing, sharp, shooting in nature with all weightbearing activities and with activities involving full extension.  Does feel as if the leg is getting give out on her.  Occasionally experiences radiating pain inferiorly to the mid tib-fib pain is not rating to the lateral aspect of the hip nor to the groin.  Again denies any prior injury.  Not currently taking any prescription medications for symptom relief has tried topical Voltaren over-the-counter as well as Tylenol arthritis without any significant symptom relief.  Denies increase activity prior to onset of pain has arrested which she does believe is helped with her pain.  Denies presence of numbness or tingling of the left lower extremity.  Denies other concerns present time.    Social History     Occupational History   • Not on file   Tobacco Use   • Smoking status: Never Smoker   • Smokeless tobacco: Never Used   • Tobacco comment: caff use   Substance and Sexual Activity   • Alcohol use: No   • Drug use: No   • Sexual activity: Defer      Past Medical History:   Diagnosis Date   • Hyperlipidemia    • Hypertension      Past Surgical History:   Procedure Laterality Date   • CAROTID ENDARTERECTOMY      left side       Family History   Problem Relation Age of Onset   • Stroke Father    • Hypertension Sister    • Stroke Sister    • Heart disease Brother 77   • Hypertension Brother    • Hypertension Sister    • Cancer Sister    • Cancer Brother    • Breast  "cancer Neg Hx          Review of Systems   Constitutional: Negative for chills, diaphoresis, fever and unexpected weight change.   HENT: Positive for dental problem. Negative for hearing loss, nosebleeds, sore throat and tinnitus.    Eyes: Negative for pain and visual disturbance.   Respiratory: Negative for cough, shortness of breath and wheezing.    Cardiovascular: Negative for chest pain and palpitations.   Gastrointestinal: Negative for abdominal pain, diarrhea, nausea and vomiting.   Endocrine: Negative for cold intolerance, heat intolerance and polydipsia.   Genitourinary: Negative for difficulty urinating, dysuria and hematuria.   Musculoskeletal: Positive for arthralgias and myalgias. Negative for joint swelling.   Skin: Negative for rash and wound.   Allergic/Immunologic: Negative for environmental allergies.   Neurological: Negative for dizziness, syncope and numbness.   Hematological: Does not bruise/bleed easily.   Psychiatric/Behavioral: Negative for dysphoric mood and sleep disturbance. The patient is not nervous/anxious.            Objective:  Physical Exam    Vital signs reviewed.   General: No acute distress.  Eyes: conjunctiva clear; pupils equally round and reactive  ENT: external ears and nose atraumatic; oropharynx clear  CV: no peripheral edema  Resp: normal respiratory effort  Skin: no rashes or wounds; normal turgor  Psych: mood and affect appropriate; recent and remote memory intact    Vitals:    01/22/21 1348   BP: (!) 190/102   BP Location: Right arm   Pulse: 89   Weight: 77.1 kg (170 lb)   Height: 165.1 cm (65\")         01/22/21  1348   Weight: 77.1 kg (170 lb)     Body mass index is 28.29 kg/m².      Left Knee Exam     Tenderness   The patient is experiencing tenderness in the medial joint line and patella.    Range of Motion   Extension: 5   Flexion: 120     Tests   Varus: negative Valgus: negative  Lachman:  Anterior - 1+    Posterior - negative  Drawer:  Anterior - negative     " Posterior - negative  Patellar apprehension: positive    Other   Erythema: absent  Sensation: normal  Pulse: present  Swelling: moderate  Effusion: no effusion present    Comments:  Positive crepitus throughout arc of motion  Positive tenderness the medial and lateral patellar facet                 Imaging:  Left Knee X-Ray  Indication: Pain    AP, Lateral, and [employer] views    Findings:  No fracture  No bony lesion  Normal soft tissues  Advanced tricompartmental osteoarthritis with bone-on-bone articulation the medial compartment, moderate to advanced patellofemoral joint narrowing, posterior joint line arthritic changes noted, osteophytes noted in all 3 compartments  No prior studies were available for comparison.    Assessment:        1. Pain    2. Primary osteoarthritis of left knee           Plan:  1. Discussed plan of care with patient.  Wishes to proceed corticosteroid injection left knee.  Plan see her back in clinic in 4 weeks.  Do recommend application of ice at injection site.  Encouraged to call with any questions concerns she has between now and follow-up.  All questions answered.  Large Joint Arthrocentesis: L knee  Date/Time: 1/22/2021 2:23 PM  Consent given by: patient  Site marked: site marked  Timeout: Immediately prior to procedure a time out was called to verify the correct patient, procedure, equipment, support staff and site/side marked as required   Supporting Documentation  Indications: pain and joint swelling   Procedure Details  Location: knee - L knee  Preparation: Patient was prepped and draped in the usual sterile fashion  Needle size: 22 G  Approach: superolateral.  Medications administered: 80 mg triamcinolone acetonide 40 MG/ML; 8 mL lidocaine PF 1% 1 %  Patient tolerance: patient tolerated the procedure well with no immediate complications          Orders:  Orders Placed This Encounter   Procedures   • Large Joint Arthrocentesis: L knee   • XR Knee 3 View Left       Medications:  No  orders of the defined types were placed in this encounter.      Followup:  No follow-ups on file.    Diagnoses and all orders for this visit:    1. Pain (Primary)  -     XR Knee 3 View Left    2. Primary osteoarthritis of left knee  -     Large Joint Arthrocentesis: L knee          I ordered and reviewed the MARY KAY today.     Dictated utilizing Dragon dictation

## 2021-03-10 ENCOUNTER — OFFICE VISIT (OUTPATIENT)
Dept: ORTHOPEDIC SURGERY | Facility: CLINIC | Age: 81
End: 2021-03-10

## 2021-03-10 VITALS — BODY MASS INDEX: 28.32 KG/M2 | HEIGHT: 65 IN | WEIGHT: 170 LBS

## 2021-03-10 DIAGNOSIS — M17.12 PRIMARY OSTEOARTHRITIS OF LEFT KNEE: Primary | ICD-10-CM

## 2021-03-10 PROBLEM — R07.89 CHEST DISCOMFORT: Status: ACTIVE | Noted: 2021-03-10

## 2021-03-10 PROCEDURE — 99213 OFFICE O/P EST LOW 20 MIN: CPT | Performed by: NURSE PRACTITIONER

## 2021-03-10 NOTE — PROGRESS NOTES
Plan:  1.  Discussed plan of care with patient and daughter.  Discussed the option of viscosupplementation injections versus repeat corticosteroid injection if not improving.  Continue with home strengthening exercises including quad, hamstring strengthening to help reduce the pressure on the knees.  Continue weightbearing as tolerated.  Patient and daughter verbalized understanding of all information agrees with plan of care.  Denies other concerns present this time.

## 2021-03-10 NOTE — PROGRESS NOTES
Subjective:     Patient ID: Danuta Batista is a 80 y.o. female.    Chief Complaint:  Follow-up DJD left knee  History of Present Illness  Danuta Batista Returns to clinic with daughter for follow-up left knee.  Received corticosteroid injection last visit with almost 100% symptom relief.  She does feel a twinge from time to time but has been able to ambulate without her cane much more comfortably.  Onset of symptom relief began approximately 1 week postinjection and extremely well ever since.  Denies of the knee is locking, catching or giving away.  Denies that she is experiencing significant swelling or pain at this time.  Very pleased with symptom relief.  Denies other concerns present this time.     Social History     Occupational History   • Not on file   Tobacco Use   • Smoking status: Never Smoker   • Smokeless tobacco: Never Used   • Tobacco comment: caff use   Vaping Use   • Vaping Use: Never used   Substance and Sexual Activity   • Alcohol use: No   • Drug use: No   • Sexual activity: Defer      Past Medical History:   Diagnosis Date   • Hyperlipidemia    • Hypertension      Past Surgical History:   Procedure Laterality Date   • CAROTID ENDARTERECTOMY      left side       Family History   Problem Relation Age of Onset   • Stroke Father    • Hypertension Sister    • Stroke Sister    • Heart disease Brother 77   • Hypertension Brother    • Hypertension Sister    • Cancer Sister    • Cancer Brother    • Breast cancer Neg Hx          Review of Systems   Constitutional: Negative for chills, diaphoresis, fever and unexpected weight change.   HENT: Negative for hearing loss, nosebleeds, sore throat and tinnitus.    Eyes: Negative for pain and visual disturbance.   Respiratory: Negative for cough, shortness of breath and wheezing.    Cardiovascular: Negative for chest pain and palpitations.   Gastrointestinal: Negative for abdominal pain, diarrhea, nausea and vomiting.   Endocrine: Negative for cold intolerance, heat  "intolerance and polydipsia.   Genitourinary: Negative for difficulty urinating, dysuria and hematuria.   Musculoskeletal: Positive for arthralgias and myalgias. Negative for joint swelling.   Skin: Negative for rash and wound.   Allergic/Immunologic: Negative for environmental allergies.   Neurological: Negative for dizziness, syncope and numbness.   Hematological: Does not bruise/bleed easily.   Psychiatric/Behavioral: Negative for dysphoric mood and sleep disturbance. The patient is not nervous/anxious.            Objective:  Physical Exam    General: No acute distress.  Eyes: conjunctiva clear; pupils equally round and reactive  ENT: external ears and nose atraumatic; oropharynx clear  CV: no peripheral edema  Resp: normal respiratory effort  Skin: no rashes or wounds; normal turgor  Psych: mood and affect appropriate; recent and remote memory intact    Vitals:    03/10/21 1430   Weight: 77.1 kg (170 lb)   Height: 165.1 cm (65\")         03/10/21  1430   Weight: 77.1 kg (170 lb)     Body mass index is 28.29 kg/m².      Ortho Exam       Left Knee Exam      Tenderness   The patient is experiencing tenderness in the medial joint line     Range of Motion   Extension: 2   Flexion: 125      Tests   Varus: negative Valgus: negative  Lachman:  Anterior - 1+    Posterior - negative  Drawer:  Anterior - negative     Posterior - negative  Patellar apprehension: positive     Other   Erythema: absent  Sensation: normal  Pulse: present  Swelling: moderate  Effusion: no effusion present     Comments:    Positive crepitus throughout arc of motion  Positive tenderness the medial and lateral patellar facet    Assessment:        1. Primary osteoarthritis of left knee           Plan:  1.  Discussed plan of care with patient and daughter.  Discussed the option of viscosupplementation injections versus repeat corticosteroid injection if not improving.  Continue with home strengthening exercises including quad, hamstring strengthening " to help reduce the pressure on the knees.  Continue weightbearing as tolerated.  Patient and daughter verbalized understanding of all information agrees with plan of care.  Denies other concerns present this time.  Orders:  No orders of the defined types were placed in this encounter.      Medications:  No orders of the defined types were placed in this encounter.      Followup:  No follow-ups on file.    Diagnoses and all orders for this visit:    1. Primary osteoarthritis of left knee (Primary)        Dictated utilizing Dragon dictation

## 2021-03-29 ENCOUNTER — LAB (OUTPATIENT)
Dept: LAB | Facility: HOSPITAL | Age: 81
End: 2021-03-29

## 2021-03-29 ENCOUNTER — HOSPITAL ENCOUNTER (OUTPATIENT)
Dept: CT IMAGING | Facility: HOSPITAL | Age: 81
Discharge: HOME | End: 2021-03-29

## 2021-03-29 ENCOUNTER — TRANSCRIBE ORDERS (OUTPATIENT)
Dept: ADMINISTRATIVE | Facility: HOSPITAL | Age: 81
End: 2021-03-29

## 2021-03-29 DIAGNOSIS — N20.2 CALCULUS OF KIDNEY AND URETER: ICD-10-CM

## 2021-03-29 DIAGNOSIS — N28.1 ACQUIRED CYST OF KIDNEY: ICD-10-CM

## 2021-03-29 DIAGNOSIS — N20.2 CALCULUS OF KIDNEY AND URETER: Primary | ICD-10-CM

## 2021-03-29 DIAGNOSIS — N20.0 KIDNEY STONES: ICD-10-CM

## 2021-03-29 LAB
ANION GAP SERPL CALCULATED.3IONS-SCNC: 10 MMOL/L (ref 5–15)
BUN SERPL-MCNC: 17 MG/DL (ref 8–23)
BUN/CREAT SERPL: 21 (ref 7–25)
CALCIUM SPEC-SCNC: 9.8 MG/DL (ref 8.6–10.5)
CHLORIDE SERPL-SCNC: 103 MMOL/L (ref 98–107)
CO2 SERPL-SCNC: 27 MMOL/L (ref 22–29)
CREAT SERPL-MCNC: 0.81 MG/DL (ref 0.57–1)
GFR SERPL CREATININE-BSD FRML MDRD: 68 ML/MIN/1.73
GLUCOSE SERPL-MCNC: 204 MG/DL (ref 65–99)
POTASSIUM SERPL-SCNC: 4.1 MMOL/L (ref 3.5–5.2)
SODIUM SERPL-SCNC: 140 MMOL/L (ref 136–145)

## 2021-03-29 PROCEDURE — 36415 COLL VENOUS BLD VENIPUNCTURE: CPT

## 2021-03-29 PROCEDURE — 80048 BASIC METABOLIC PNL TOTAL CA: CPT

## 2021-03-29 PROCEDURE — 74176 CT ABD & PELVIS W/O CONTRAST: CPT

## 2021-04-09 ENCOUNTER — TRANSCRIBE ORDERS (OUTPATIENT)
Dept: ADMINISTRATIVE | Facility: HOSPITAL | Age: 81
End: 2021-04-09

## 2021-04-09 DIAGNOSIS — N20.2 CALCULUS OF KIDNEY AND URETER: Primary | ICD-10-CM

## 2021-05-13 ENCOUNTER — APPOINTMENT (OUTPATIENT)
Dept: CT IMAGING | Facility: HOSPITAL | Age: 81
End: 2021-05-13

## 2021-05-13 ENCOUNTER — HOSPITAL ENCOUNTER (EMERGENCY)
Facility: HOSPITAL | Age: 81
Discharge: HOME | End: 2021-05-13
Attending: EMERGENCY MEDICINE | Admitting: EMERGENCY MEDICINE

## 2021-05-13 VITALS
RESPIRATION RATE: 18 BRPM | WEIGHT: 169.3 LBS | BODY MASS INDEX: 28.21 KG/M2 | HEART RATE: 94 BPM | DIASTOLIC BLOOD PRESSURE: 108 MMHG | SYSTOLIC BLOOD PRESSURE: 191 MMHG | OXYGEN SATURATION: 96 % | HEIGHT: 65 IN | TEMPERATURE: 98.1 F

## 2021-05-13 DIAGNOSIS — N20.1 URETEROLITHIASIS: Primary | ICD-10-CM

## 2021-05-13 LAB
ALBUMIN SERPL-MCNC: 4.8 G/DL (ref 3.5–5.2)
ALBUMIN/GLOB SERPL: 1.5 G/DL
ALP SERPL-CCNC: 104 U/L (ref 39–117)
ALT SERPL W P-5'-P-CCNC: 27 U/L (ref 1–33)
ANION GAP SERPL CALCULATED.3IONS-SCNC: 13.7 MMOL/L (ref 5–15)
AST SERPL-CCNC: 23 U/L (ref 1–32)
BACTERIA UR QL AUTO: ABNORMAL /HPF
BASOPHILS # BLD AUTO: 0.07 10*3/MM3 (ref 0–0.2)
BASOPHILS NFR BLD AUTO: 0.6 % (ref 0–1.5)
BILIRUB SERPL-MCNC: 0.8 MG/DL (ref 0–1.2)
BILIRUB UR QL STRIP: NEGATIVE
BUN SERPL-MCNC: 17 MG/DL (ref 8–23)
BUN/CREAT SERPL: 18.7 (ref 7–25)
CALCIUM SPEC-SCNC: 10.4 MG/DL (ref 8.6–10.5)
CHLORIDE SERPL-SCNC: 101 MMOL/L (ref 98–107)
CLARITY UR: CLEAR
CO2 SERPL-SCNC: 24.3 MMOL/L (ref 22–29)
COLOR UR: YELLOW
CREAT SERPL-MCNC: 0.91 MG/DL (ref 0.57–1)
DEPRECATED RDW RBC AUTO: 46 FL (ref 37–54)
EOSINOPHIL # BLD AUTO: 0.1 10*3/MM3 (ref 0–0.4)
EOSINOPHIL NFR BLD AUTO: 0.9 % (ref 0.3–6.2)
ERYTHROCYTE [DISTWIDTH] IN BLOOD BY AUTOMATED COUNT: 13.6 % (ref 12.3–15.4)
GFR SERPL CREATININE-BSD FRML MDRD: 59 ML/MIN/1.73
GLOBULIN UR ELPH-MCNC: 3.2 GM/DL
GLUCOSE SERPL-MCNC: 162 MG/DL (ref 65–99)
GLUCOSE UR STRIP-MCNC: NEGATIVE MG/DL
HCT VFR BLD AUTO: 50.6 % (ref 34–46.6)
HGB BLD-MCNC: 16.1 G/DL (ref 12–15.9)
HGB UR QL STRIP.AUTO: ABNORMAL
HYALINE CASTS UR QL AUTO: ABNORMAL /LPF
IMM GRANULOCYTES # BLD AUTO: 0.04 10*3/MM3 (ref 0–0.05)
IMM GRANULOCYTES NFR BLD AUTO: 0.4 % (ref 0–0.5)
KETONES UR QL STRIP: NEGATIVE
LARGE PLATELETS: NORMAL
LEUKOCYTE ESTERASE UR QL STRIP.AUTO: NEGATIVE
LYMPHOCYTES # BLD AUTO: 0.7 10*3/MM3 (ref 0.7–3.1)
LYMPHOCYTES NFR BLD AUTO: 6.4 % (ref 19.6–45.3)
MCH RBC QN AUTO: 29.5 PG (ref 26.6–33)
MCHC RBC AUTO-ENTMCNC: 31.8 G/DL (ref 31.5–35.7)
MCV RBC AUTO: 92.7 FL (ref 79–97)
MONOCYTES # BLD AUTO: 0.89 10*3/MM3 (ref 0.1–0.9)
MONOCYTES NFR BLD AUTO: 8.2 % (ref 5–12)
NEUTROPHILS NFR BLD AUTO: 83.5 % (ref 42.7–76)
NEUTROPHILS NFR BLD AUTO: 9.07 10*3/MM3 (ref 1.7–7)
NITRITE UR QL STRIP: NEGATIVE
NRBC BLD AUTO-RTO: 0 /100 WBC (ref 0–0.2)
PH UR STRIP.AUTO: 7 [PH] (ref 4.5–8)
PLAT MORPH BLD: NORMAL
PLATELET # BLD AUTO: 142 10*3/MM3 (ref 140–450)
PMV BLD AUTO: 10.8 FL (ref 6–12)
POTASSIUM SERPL-SCNC: 4 MMOL/L (ref 3.5–5.2)
PROT SERPL-MCNC: 8 G/DL (ref 6–8.5)
PROT UR QL STRIP: ABNORMAL
RBC # BLD AUTO: 5.46 10*6/MM3 (ref 3.77–5.28)
RBC # UR: ABNORMAL /HPF
RBC MORPH BLD: NORMAL
REF LAB TEST METHOD: ABNORMAL
SODIUM SERPL-SCNC: 139 MMOL/L (ref 136–145)
SP GR UR STRIP: 1.01 (ref 1–1.03)
SQUAMOUS #/AREA URNS HPF: ABNORMAL /HPF
UROBILINOGEN UR QL STRIP: ABNORMAL
WBC # BLD AUTO: 10.87 10*3/MM3 (ref 3.4–10.8)
WBC MORPH BLD: NORMAL
WBC UR QL AUTO: ABNORMAL /HPF

## 2021-05-13 PROCEDURE — 99284 EMERGENCY DEPT VISIT MOD MDM: CPT

## 2021-05-13 PROCEDURE — 85025 COMPLETE CBC W/AUTO DIFF WBC: CPT | Performed by: EMERGENCY MEDICINE

## 2021-05-13 PROCEDURE — P9612 CATHETERIZE FOR URINE SPEC: HCPCS

## 2021-05-13 PROCEDURE — 80053 COMPREHEN METABOLIC PANEL: CPT | Performed by: EMERGENCY MEDICINE

## 2021-05-13 PROCEDURE — 85007 BL SMEAR W/DIFF WBC COUNT: CPT | Performed by: EMERGENCY MEDICINE

## 2021-05-13 PROCEDURE — 96374 THER/PROPH/DIAG INJ IV PUSH: CPT

## 2021-05-13 PROCEDURE — 74176 CT ABD & PELVIS W/O CONTRAST: CPT

## 2021-05-13 PROCEDURE — 81001 URINALYSIS AUTO W/SCOPE: CPT | Performed by: EMERGENCY MEDICINE

## 2021-05-13 PROCEDURE — 99284 EMERGENCY DEPT VISIT MOD MDM: CPT | Performed by: EMERGENCY MEDICINE

## 2021-05-13 RX ORDER — LABETALOL HYDROCHLORIDE 5 MG/ML
10 INJECTION, SOLUTION INTRAVENOUS ONCE
Status: COMPLETED | OUTPATIENT
Start: 2021-05-13 | End: 2021-05-13

## 2021-05-13 RX ORDER — SODIUM CHLORIDE 0.9 % (FLUSH) 0.9 %
10 SYRINGE (ML) INJECTION AS NEEDED
Status: DISCONTINUED | OUTPATIENT
Start: 2021-05-13 | End: 2021-05-13 | Stop reason: HOSPADM

## 2021-05-13 RX ADMIN — LABETALOL HYDROCHLORIDE 10 MG: 5 INJECTION, SOLUTION INTRAVENOUS at 06:52

## 2021-05-24 ENCOUNTER — LAB (OUTPATIENT)
Dept: LAB | Facility: HOSPITAL | Age: 81
End: 2021-05-24

## 2021-05-24 ENCOUNTER — TRANSCRIBE ORDERS (OUTPATIENT)
Dept: ADMINISTRATIVE | Facility: HOSPITAL | Age: 81
End: 2021-05-24

## 2021-05-24 DIAGNOSIS — E78.2 MIXED HYPERLIPIDEMIA: ICD-10-CM

## 2021-05-24 DIAGNOSIS — E11.9 DIABETES MELLITUS WITHOUT COMPLICATION (HCC): ICD-10-CM

## 2021-05-24 DIAGNOSIS — I10 ESSENTIAL HYPERTENSION, BENIGN: Primary | ICD-10-CM

## 2021-05-24 DIAGNOSIS — I10 ESSENTIAL HYPERTENSION, BENIGN: ICD-10-CM

## 2021-05-24 DIAGNOSIS — D69.49 OTHER PRIMARY THROMBOCYTOPENIA (HCC): ICD-10-CM

## 2021-05-24 LAB
ALBUMIN SERPL-MCNC: 4.3 G/DL (ref 3.5–5.2)
ALBUMIN/GLOB SERPL: 1.3 G/DL
ALP SERPL-CCNC: 98 U/L (ref 39–117)
ALT SERPL W P-5'-P-CCNC: 28 U/L (ref 1–33)
ANION GAP SERPL CALCULATED.3IONS-SCNC: 12.3 MMOL/L (ref 5–15)
AST SERPL-CCNC: 25 U/L (ref 1–32)
BASOPHILS # BLD AUTO: 0.06 10*3/MM3 (ref 0–0.2)
BASOPHILS NFR BLD AUTO: 1.1 % (ref 0–1.5)
BILIRUB SERPL-MCNC: 0.5 MG/DL (ref 0–1.2)
BUN SERPL-MCNC: 18 MG/DL (ref 8–23)
BUN/CREAT SERPL: 25.7 (ref 7–25)
CALCIUM SPEC-SCNC: 10.1 MG/DL (ref 8.6–10.5)
CHLORIDE SERPL-SCNC: 105 MMOL/L (ref 98–107)
CHOLEST SERPL-MCNC: 146 MG/DL (ref 0–200)
CO2 SERPL-SCNC: 21.7 MMOL/L (ref 22–29)
CREAT SERPL-MCNC: 0.7 MG/DL (ref 0.57–1)
DEPRECATED RDW RBC AUTO: 44.7 FL (ref 37–54)
EOSINOPHIL # BLD AUTO: 0.26 10*3/MM3 (ref 0–0.4)
EOSINOPHIL NFR BLD AUTO: 4.6 % (ref 0.3–6.2)
ERYTHROCYTE [DISTWIDTH] IN BLOOD BY AUTOMATED COUNT: 13.4 % (ref 12.3–15.4)
GFR SERPL CREATININE-BSD FRML MDRD: 80 ML/MIN/1.73
GLOBULIN UR ELPH-MCNC: 3.3 GM/DL
GLUCOSE SERPL-MCNC: 130 MG/DL (ref 65–99)
HBA1C MFR BLD: 6.2 % (ref 4.8–5.6)
HCT VFR BLD AUTO: 45.9 % (ref 34–46.6)
HDLC SERPL-MCNC: 29 MG/DL (ref 40–60)
HGB BLD-MCNC: 15 G/DL (ref 12–15.9)
IMM GRANULOCYTES # BLD AUTO: 0.04 10*3/MM3 (ref 0–0.05)
IMM GRANULOCYTES NFR BLD AUTO: 0.7 % (ref 0–0.5)
LDLC SERPL CALC-MCNC: 76 MG/DL (ref 0–100)
LDLC/HDLC SERPL: 2.33 {RATIO}
LYMPHOCYTES # BLD AUTO: 1.11 10*3/MM3 (ref 0.7–3.1)
LYMPHOCYTES NFR BLD AUTO: 19.8 % (ref 19.6–45.3)
MCH RBC QN AUTO: 29.8 PG (ref 26.6–33)
MCHC RBC AUTO-ENTMCNC: 32.7 G/DL (ref 31.5–35.7)
MCV RBC AUTO: 91.3 FL (ref 79–97)
MONOCYTES # BLD AUTO: 0.51 10*3/MM3 (ref 0.1–0.9)
MONOCYTES NFR BLD AUTO: 9.1 % (ref 5–12)
NEUTROPHILS NFR BLD AUTO: 3.63 10*3/MM3 (ref 1.7–7)
NEUTROPHILS NFR BLD AUTO: 64.7 % (ref 42.7–76)
NRBC BLD AUTO-RTO: 0 /100 WBC (ref 0–0.2)
PLATELET # BLD AUTO: 170 10*3/MM3 (ref 140–450)
PMV BLD AUTO: 11 FL (ref 6–12)
POTASSIUM SERPL-SCNC: 4.2 MMOL/L (ref 3.5–5.2)
PROT SERPL-MCNC: 7.6 G/DL (ref 6–8.5)
RBC # BLD AUTO: 5.03 10*6/MM3 (ref 3.77–5.28)
SODIUM SERPL-SCNC: 139 MMOL/L (ref 136–145)
TRIGL SERPL-MCNC: 247 MG/DL (ref 0–150)
VLDLC SERPL-MCNC: 41 MG/DL (ref 5–40)
WBC # BLD AUTO: 5.61 10*3/MM3 (ref 3.4–10.8)

## 2021-05-24 PROCEDURE — 85025 COMPLETE CBC W/AUTO DIFF WBC: CPT

## 2021-05-24 PROCEDURE — 83036 HEMOGLOBIN GLYCOSYLATED A1C: CPT

## 2021-05-24 PROCEDURE — 80053 COMPREHEN METABOLIC PANEL: CPT

## 2021-05-24 PROCEDURE — 80061 LIPID PANEL: CPT

## 2021-05-24 PROCEDURE — 36415 COLL VENOUS BLD VENIPUNCTURE: CPT

## 2021-06-28 ENCOUNTER — LAB (OUTPATIENT)
Dept: LAB | Facility: HOSPITAL | Age: 81
End: 2021-06-28

## 2021-06-28 ENCOUNTER — HOSPITAL ENCOUNTER (OUTPATIENT)
Dept: ULTRASOUND IMAGING | Facility: HOSPITAL | Age: 81
Discharge: HOME | End: 2021-06-28

## 2021-06-28 ENCOUNTER — TRANSCRIBE ORDERS (OUTPATIENT)
Dept: ADMINISTRATIVE | Facility: HOSPITAL | Age: 81
End: 2021-06-28

## 2021-06-28 DIAGNOSIS — N20.2 CALCULUS OF KIDNEY AND URETER: Primary | ICD-10-CM

## 2021-06-28 DIAGNOSIS — N20.2 CALCULUS OF KIDNEY AND URETER: ICD-10-CM

## 2021-06-28 DIAGNOSIS — N28.1 ACQUIRED CYST OF KIDNEY: ICD-10-CM

## 2021-06-28 LAB
ANION GAP SERPL CALCULATED.3IONS-SCNC: 13 MMOL/L (ref 5–15)
BUN SERPL-MCNC: 18 MG/DL (ref 8–23)
BUN/CREAT SERPL: 25.4 (ref 7–25)
CALCIUM SPEC-SCNC: 10.1 MG/DL (ref 8.6–10.5)
CHLORIDE SERPL-SCNC: 101 MMOL/L (ref 98–107)
CO2 SERPL-SCNC: 26 MMOL/L (ref 22–29)
CREAT SERPL-MCNC: 0.71 MG/DL (ref 0.57–1)
GFR SERPL CREATININE-BSD FRML MDRD: 79 ML/MIN/1.73
GLUCOSE SERPL-MCNC: 108 MG/DL (ref 65–99)
POTASSIUM SERPL-SCNC: 4.2 MMOL/L (ref 3.5–5.2)
SODIUM SERPL-SCNC: 140 MMOL/L (ref 136–145)

## 2021-06-28 PROCEDURE — 36415 COLL VENOUS BLD VENIPUNCTURE: CPT

## 2021-06-28 PROCEDURE — 80048 BASIC METABOLIC PNL TOTAL CA: CPT

## 2021-06-28 PROCEDURE — 76775 US EXAM ABDO BACK WALL LIM: CPT

## 2021-07-09 ENCOUNTER — HOSPITAL ENCOUNTER (OUTPATIENT)
Dept: INFUSION THERAPY | Facility: HOSPITAL | Age: 81
Discharge: HOME | End: 2021-07-09
Admitting: UROLOGY

## 2021-07-09 ENCOUNTER — TRANSCRIBE ORDERS (OUTPATIENT)
Dept: ADMINISTRATIVE | Facility: HOSPITAL | Age: 81
End: 2021-07-09

## 2021-07-09 VITALS
HEART RATE: 58 BPM | WEIGHT: 165.8 LBS | TEMPERATURE: 98.2 F | BODY MASS INDEX: 28.31 KG/M2 | DIASTOLIC BLOOD PRESSURE: 79 MMHG | SYSTOLIC BLOOD PRESSURE: 171 MMHG | RESPIRATION RATE: 16 BRPM | HEIGHT: 64 IN | OXYGEN SATURATION: 97 %

## 2021-07-09 DIAGNOSIS — N39.0 URINARY TRACT INFECTION WITH HEMATURIA, SITE UNSPECIFIED: Primary | ICD-10-CM

## 2021-07-09 DIAGNOSIS — Z87.440 H/O BLADDER INFECTIONS: Primary | ICD-10-CM

## 2021-07-09 DIAGNOSIS — N30.20 BLADDER INFECTION, CHRONIC: ICD-10-CM

## 2021-07-09 DIAGNOSIS — N20.2 CALCULUS OF KIDNEY AND URETER: ICD-10-CM

## 2021-07-09 DIAGNOSIS — R31.9 URINARY TRACT INFECTION WITH HEMATURIA, SITE UNSPECIFIED: Primary | ICD-10-CM

## 2021-07-09 PROCEDURE — 96372 THER/PROPH/DIAG INJ SC/IM: CPT

## 2021-07-09 PROCEDURE — 25010000002 CEFTRIAXONE PER 250 MG: Performed by: UROLOGY

## 2021-07-09 RX ORDER — METOPROLOL SUCCINATE 25 MG/1
25 TABLET, EXTENDED RELEASE ORAL DAILY
COMMUNITY

## 2021-07-09 RX ORDER — ASPIRIN 81 MG/1
81 TABLET ORAL DAILY
COMMUNITY

## 2021-07-09 RX ADMIN — LIDOCAINE HYDROCHLORIDE 1 G: 10 INJECTION, SOLUTION EPIDURAL; INFILTRATION; INTRACAUDAL; PERINEURAL at 14:32

## 2021-07-09 NOTE — PATIENT INSTRUCTIONS
Call  Dr. Saman Salas, [facility] at [phone] Ceftriaxone Injection  What is this medicine?  CEFTRIAXONE (sef try AX one) is a cephalosporin antibiotic. It treats some infections caused by bacteria. It will not work for colds, the flu, or other viruses.  This medicine may be used for other purposes; ask your health care provider or pharmacist if you have questions.  COMMON BRAND NAME(S): Ceftrisol Plus, Rocephin  What should I tell my health care provider before I take this medicine?  They need to know if you have any of these conditions:  · any chronic illness  · bowel disease, like colitis  · both kidney and liver disease  · high bilirubin level in  patients  · an unusual or allergic reaction to ceftriaxone, other cephalosporin or penicillin antibiotics, foods, dyes, or preservatives  · pregnant or trying to get pregnant  · breast-feeding  How should I use this medicine?  This drug is injected into a muscle or a vein. It is usually given by a health care provider in a hospital or clinic setting.  If you get this drug at home, you will be taught how to prepare and give it. Use exactly as directed. Take it as directed on the prescription label at the same time every day. Keep taking it unless your health care provider tells you to stop.  It is important that you put your used needles and syringes in a special sharps container. Do not put them in a trash can. If you do not have a sharps container, call your pharmacist or health care provider to get one.  Talk to your health care provider about the use of this drug in children. While it may be prescribed for children as young as newborns for selected conditions, precautions do apply.  Overdosage: If you think you have taken too much of this medicine contact a poison control center or emergency room at once.  NOTE: This medicine is only for you. Do not share this medicine with others.  What if I miss a dose?  It is important not to miss your  dose. Call your health care provider if you are unable to keep an appointment. If you give yourself this drug at home and you miss a dose, take it as soon as you can. If it is almost time for your next dose, take only that dose. Do not take double or extra doses.  What may interact with this medicine?  Do not take this medicine with any of the following medications:  · intravenous calcium  This medicine may also interact with the following medications:  · birth control pills  This list may not describe all possible interactions. Give your health care provider a list of all the medicines, herbs, non-prescription drugs, or dietary supplements you use. Also tell them if you smoke, drink alcohol, or use illegal drugs. Some items may interact with your medicine.  What should I watch for while using this medicine?  Tell your doctor or health care provider if your symptoms do not improve or if they get worse.  This medicine may cause serious skin reactions. They can happen weeks to months after starting the medicine. Contact your health care provider right away if you notice fevers or flu-like symptoms with a rash. The rash may be red or purple and then turn into blisters or peeling of the skin. Or, you might notice a red rash with swelling of the face, lips or lymph nodes in your neck or under your arms.  Do not treat diarrhea with over the counter products. Contact your doctor if you have diarrhea that lasts more than 2 days or if it is severe and watery.  If you are being treated for a sexually transmitted disease, avoid sexual contact until you have finished your treatment. Having sex can infect your sexual partner.  Calcium may bind to this medicine and cause lung or kidney problems. Avoid calcium products while taking this medicine and for 48 hours after taking the last dose of this medicine.  What side effects may I notice from receiving this medicine?  Side effects that you should report to your doctor or health  "care professional as soon as possible:  · allergic reactions like skin rash, itching or hives, swelling of the face, lips, or tongue  · breathing problems  · fever, chills  · irregular heartbeat  · pain when passing urine  · redness, blistering, peeling, or loosening of the skin, including inside the mouth  · seizures  · stomach pain, cramps  · unusual bleeding, bruising  · unusually weak or tired  Side effects that usually do not require medical attention (report to your doctor or health care professional if they continue or are bothersome):  · diarrhea  · dizzy, drowsy  · headache  · nausea, vomiting  · pain, swelling, irritation where injected  · stomach upset  · sweating  This list may not describe all possible side effects. Call your doctor for medical advice about side effects. You may report side effects to FDA at 5-511-FDA-3723.  Where should I keep my medicine?  Keep out of the reach of children and pets.  You will be instructed on how to store this drug. Protect from light. Throw away any unused drug after the expiration date.  NOTE: This sheet is a summary. It may not cover all possible information. If you have questions about this medicine, talk to your doctor, pharmacist, or health care provider.  © 2021 [employer]vier/Gold Standard (2020-07-23 18:29:21)   if you have any problems or concerns.    We know you have a Choice in healthcare and appreciate you using [facility].  Our purpose is to provide you \"Excellent Care\".  We hope that you will always choose us in the future and continue to recommend us to your family and friends.              "

## 2021-07-09 NOTE — NURSING NOTE
NURSE PROGRESS NOTE    PT. ARRIVED @ [facility] FOR SCHEDULED INJECTION AT 1400 .  INJECTION WAS PERFORMED WITHOUT INCIDENT.  PT. DISCHARGED TO HOME AT 1500.PATIENT DID STAY FOR 30 MINUTES AFTER INJECTION D/T HISTORY OF [facility] ALLERGY. PHARMACIST SHARMIN TALKED WITH DR. ROBERTS, CONFIRMING OK TO GIVE ROCEPHIN PRIOR TO DOSE BEING GIVEN. AVS PRINTED & REVIEWED WITH PATIENT INCLUDING S/S OF ALLERGIC REACTION.

## 2021-07-10 ENCOUNTER — HOSPITAL ENCOUNTER (OUTPATIENT)
Dept: INFUSION THERAPY | Facility: HOSPITAL | Age: 81
Discharge: HOME | End: 2021-07-10
Admitting: UROLOGY

## 2021-07-10 VITALS
DIASTOLIC BLOOD PRESSURE: 85 MMHG | OXYGEN SATURATION: 97 % | TEMPERATURE: 98 F | RESPIRATION RATE: 16 BRPM | SYSTOLIC BLOOD PRESSURE: 144 MMHG | HEART RATE: 68 BPM

## 2021-07-10 DIAGNOSIS — Z87.440 H/O BLADDER INFECTIONS: Primary | ICD-10-CM

## 2021-07-10 DIAGNOSIS — N20.2 CALCULUS OF KIDNEY AND URETER: ICD-10-CM

## 2021-07-10 DIAGNOSIS — N30.20 BLADDER INFECTION, CHRONIC: ICD-10-CM

## 2021-07-10 PROCEDURE — 25010000002 CEFTRIAXONE PER 250 MG: Performed by: UROLOGY

## 2021-07-10 PROCEDURE — 96372 THER/PROPH/DIAG INJ SC/IM: CPT

## 2021-07-10 RX ADMIN — LIDOCAINE HYDROCHLORIDE 1 G: 10 INJECTION, SOLUTION EPIDURAL; INFILTRATION; INTRACAUDAL; PERINEURAL at 13:49

## 2021-07-10 NOTE — NURSING NOTE
Pt arrives for outpatient injection. To 1403 per wheelchair accompanied per RN.  Given Rocephin  1 GM IM diluted with Lidocaine 1% per pharmacy.  Waited 15 minute shot time. No signs of rash, itching, or signs of infection.  Escorted to lobby on discharge

## 2021-07-11 ENCOUNTER — HOSPITAL ENCOUNTER (OUTPATIENT)
Dept: INFUSION THERAPY | Facility: HOSPITAL | Age: 81
Discharge: HOME | End: 2021-07-11
Admitting: UROLOGY

## 2021-07-11 VITALS
HEART RATE: 68 BPM | TEMPERATURE: 97.7 F | OXYGEN SATURATION: 98 % | DIASTOLIC BLOOD PRESSURE: 85 MMHG | RESPIRATION RATE: 18 BRPM | SYSTOLIC BLOOD PRESSURE: 153 MMHG

## 2021-07-11 DIAGNOSIS — N30.20 BLADDER INFECTION, CHRONIC: ICD-10-CM

## 2021-07-11 DIAGNOSIS — N20.2 CALCULUS OF KIDNEY AND URETER: ICD-10-CM

## 2021-07-11 DIAGNOSIS — Z87.440 H/O BLADDER INFECTIONS: Primary | ICD-10-CM

## 2021-07-11 PROCEDURE — 25010000002 CEFTRIAXONE PER 250 MG: Performed by: UROLOGY

## 2021-07-11 PROCEDURE — 96372 THER/PROPH/DIAG INJ SC/IM: CPT

## 2021-07-11 RX ADMIN — LIDOCAINE HYDROCHLORIDE 1 G: 10 INJECTION, SOLUTION EPIDURAL; INFILTRATION; INTRACAUDAL; PERINEURAL at 14:10

## 2021-07-11 NOTE — NURSING NOTE
Pt arrives for outpatient injection.  Escorted to 1404 per wheelchair. Given Rocephin 1 GM IM diluted with Lidocaine1% per pharmacy.  Waited shot time. No signs of allergic reaction noted

## 2021-07-12 ENCOUNTER — HOSPITAL ENCOUNTER (OUTPATIENT)
Dept: INFUSION THERAPY | Facility: HOSPITAL | Age: 81
Setting detail: INFUSION SERIES
Discharge: HOME | End: 2021-07-12

## 2021-07-12 VITALS
RESPIRATION RATE: 16 BRPM | HEART RATE: 65 BPM | TEMPERATURE: 98.3 F | DIASTOLIC BLOOD PRESSURE: 69 MMHG | SYSTOLIC BLOOD PRESSURE: 141 MMHG | OXYGEN SATURATION: 97 %

## 2021-07-12 DIAGNOSIS — N20.2 CALCULUS OF KIDNEY AND URETER: ICD-10-CM

## 2021-07-12 DIAGNOSIS — Z87.440 H/O BLADDER INFECTIONS: Primary | ICD-10-CM

## 2021-07-12 DIAGNOSIS — N30.20 BLADDER INFECTION, CHRONIC: ICD-10-CM

## 2021-07-12 PROCEDURE — 96372 THER/PROPH/DIAG INJ SC/IM: CPT

## 2021-07-12 PROCEDURE — 25010000002 CEFTRIAXONE PER 250 MG: Performed by: UROLOGY

## 2021-07-12 RX ADMIN — LIDOCAINE HYDROCHLORIDE 1 G: 10 INJECTION, SOLUTION EPIDURAL; INFILTRATION; INTRACAUDAL; PERINEURAL at 14:25

## 2021-07-12 NOTE — PATIENT INSTRUCTIONS
"  Call    Dr Salas  [phone] We know you have a Choice in healthcare and appreciate you using [facility].  Our purpose is to provide you \"Excellent Care\".  We hope that you will always choose us in the future and continue to recommend us to your family and friends.              CEFTRIAXONE (sef try AX one) is a cephalosporin antibiotic. It treats some infections caused by bacteria. It will not work for colds, the flu, or other viruses.  This medicine may be used for other purposes; ask your health care provider or pharmacist if you have questions.  COMMON BRAND NAME(S): Ceftrisol Plus, Rocephin  What should I tell my health care provider before I take this medicine?  They need to know if you have any of these conditions:  · any chronic illness  · bowel disease, like colitis  · both kidney and liver disease  · high bilirubin level in  patients  · an unusual or allergic reaction to ceftriaxone, other cephalosporin or penicillin antibiotics, foods, dyes, or preservatives  · pregnant or trying to get pregnant  · breast-feeding  How should I use this medicine?  This drug is injected into a muscle or a vein. It is usually given by a health care provider in a hospital or clinic setting.  If you get this drug at home, you will be taught how to prepare and give it. Use exactly as directed. Take it as directed on the prescription label at the same time every day. Keep taking it unless your health care provider tells you to stop.  It is important that you put your used needles and syringes in a special sharps container. Do not put them in a trash can. If you do not have a sharps container, call your pharmacist or health care provider to get one.  Talk to your health care provider about the use of this drug in children. While it may be prescribed for children as young as newborns for selected conditions, precautions do apply.  Overdosage: If you think you have taken too much of this medicine contact a " poison control center or emergency room at once.  NOTE: This medicine is only for you. Do not share this medicine with others.  What if I miss a dose?  It is important not to miss your dose. Call your health care provider if you are unable to keep an appointment. If you give yourself this drug at home and you miss a dose, take it as soon as you can. If it is almost time for your next dose, take only that dose. Do not take double or extra doses.  What may interact with this medicine?  Do not take this medicine with any of the following medications:  · intravenous calcium  This medicine may also interact with the following medications:  · birth control pills  This list may not describe all possible interactions. Give your health care provider a list of all the medicines, herbs, non-prescription drugs, or dietary supplements you use. Also tell them if you smoke, drink alcohol, or use illegal drugs. Some items may interact with your medicine.  What should I watch for while using this medicine?  Tell your doctor or health care provider if your symptoms do not improve or if they get worse.  This medicine may cause serious skin reactions. They can happen weeks to months after starting the medicine. Contact your health care provider right away if you notice fevers or flu-like symptoms with a rash. The rash may be red or purple and then turn into blisters or peeling of the skin. Or, you might notice a red rash with swelling of the face, lips or lymph nodes in your neck or under your arms.  Do not treat diarrhea with over the counter products. Contact your doctor if you have diarrhea that lasts more than 2 days or if it is severe and watery.  If you are being treated for a sexually transmitted disease, avoid sexual contact until you have finished your treatment. Having sex can infect your sexual partner.  Calcium may bind to this medicine and cause lung or kidney problems. Avoid calcium products while taking this medicine and  for 48 hours after taking the last dose of this medicine.  What side effects may I notice from receiving this medicine?  Side effects that you should report to your doctor or health care professional as soon as possible:  · allergic reactions like skin rash, itching or hives, swelling of the face, lips, or tongue  · breathing problems  · fever, chills  · irregular heartbeat  · pain when passing urine  · redness, blistering, peeling, or loosening of the skin, including inside the mouth  · seizures  · stomach pain, cramps  · unusual bleeding, bruising  · unusually weak or tired  Side effects that usually do not require medical attention (report to your doctor or health care professional if they continue or are bothersome):  · diarrhea  · dizzy, drowsy  · headache  · nausea, vomiting  · pain, swelling, irritation where injected  · stomach upset  · sweating  This list may not describe all possible side effects. Call your doctor for medical advice about side effects. You may report side effects to FDA at 0-929-FDA-0193.  Where should I keep my medicine?  Keep out of the reach of children and pets.  You will be instructed on how to store this drug. Protect from light. Throw away any unused drug after the expiration date.  NOTE: This sheet is a summary. It may not cover all possible information. If you have questions about this medicine, talk to your doctor, pharmacist, or health care provider.  © 2021 [employer]vier/Gold Standard (2020-07-23 18:29:21)

## 2021-07-12 NOTE — NURSING NOTE
Patient arrived via wheelchair to [facility] at 1348.  Medication given as ordered.  Waited after injection.  AVS discussed and given to patient. Patient denies any concerns at this time.  Discharged via wheelchair at 1425.

## 2021-07-13 ENCOUNTER — HOSPITAL ENCOUNTER (OUTPATIENT)
Dept: INFUSION THERAPY | Facility: HOSPITAL | Age: 81
Discharge: HOME | End: 2021-07-13
Admitting: UROLOGY

## 2021-07-13 VITALS
TEMPERATURE: 98.4 F | SYSTOLIC BLOOD PRESSURE: 141 MMHG | HEART RATE: 66 BPM | DIASTOLIC BLOOD PRESSURE: 77 MMHG | OXYGEN SATURATION: 98 % | RESPIRATION RATE: 16 BRPM

## 2021-07-13 DIAGNOSIS — N30.20 BLADDER INFECTION, CHRONIC: ICD-10-CM

## 2021-07-13 DIAGNOSIS — Z87.440 H/O BLADDER INFECTIONS: Primary | ICD-10-CM

## 2021-07-13 DIAGNOSIS — N20.2 CALCULUS OF KIDNEY AND URETER: ICD-10-CM

## 2021-07-13 PROCEDURE — 25010000002 CEFTRIAXONE PER 250 MG: Performed by: UROLOGY

## 2021-07-13 PROCEDURE — 96372 THER/PROPH/DIAG INJ SC/IM: CPT

## 2021-07-13 RX ADMIN — LIDOCAINE HYDROCHLORIDE 1 G: 10 INJECTION, SOLUTION EPIDURAL; INFILTRATION; INTRACAUDAL; PERINEURAL at 14:18

## 2021-07-13 NOTE — NURSING NOTE
NURSE PROGRESS NOTE    PT. ARRIVED @ [facility] FOR SCHEDULED INJECTION AT 1348 .  INJECTION WAS PERFORMED WITHOUT INCIDENT.  PT. DISCHARGED TO HOME AT 1430.

## 2021-07-13 NOTE — PATIENT INSTRUCTIONS
Call  Dr. Saman Salas, [facility] at [phone] Ceftriaxone Injection  What is this medicine?  CEFTRIAXONE (sef try AX one) is a cephalosporin antibiotic. It treats some infections caused by bacteria. It will not work for colds, the flu, or other viruses.  This medicine may be used for other purposes; ask your health care provider or pharmacist if you have questions.  COMMON BRAND NAME(S): Ceftrisol Plus, Rocephin  What should I tell my health care provider before I take this medicine?  They need to know if you have any of these conditions:  · any chronic illness  · bowel disease, like colitis  · both kidney and liver disease  · high bilirubin level in  patients  · an unusual or allergic reaction to ceftriaxone, other cephalosporin or penicillin antibiotics, foods, dyes, or preservatives  · pregnant or trying to get pregnant  · breast-feeding  How should I use this medicine?  This drug is injected into a muscle or a vein. It is usually given by a health care provider in a hospital or clinic setting.  If you get this drug at home, you will be taught how to prepare and give it. Use exactly as directed. Take it as directed on the prescription label at the same time every day. Keep taking it unless your health care provider tells you to stop.  It is important that you put your used needles and syringes in a special sharps container. Do not put them in a trash can. If you do not have a sharps container, call your pharmacist or health care provider to get one.  Talk to your health care provider about the use of this drug in children. While it may be prescribed for children as young as newborns for selected conditions, precautions do apply.  Overdosage: If you think you have taken too much of this medicine contact a poison control center or emergency room at once.  NOTE: This medicine is only for you. Do not share this medicine with others.  What if I miss a dose?  It is important not to miss your  dose. Call your health care provider if you are unable to keep an appointment. If you give yourself this drug at home and you miss a dose, take it as soon as you can. If it is almost time for your next dose, take only that dose. Do not take double or extra doses.  What may interact with this medicine?  Do not take this medicine with any of the following medications:  · intravenous calcium  This medicine may also interact with the following medications:  · birth control pills  This list may not describe all possible interactions. Give your health care provider a list of all the medicines, herbs, non-prescription drugs, or dietary supplements you use. Also tell them if you smoke, drink alcohol, or use illegal drugs. Some items may interact with your medicine.  What should I watch for while using this medicine?  Tell your doctor or health care provider if your symptoms do not improve or if they get worse.  This medicine may cause serious skin reactions. They can happen weeks to months after starting the medicine. Contact your health care provider right away if you notice fevers or flu-like symptoms with a rash. The rash may be red or purple and then turn into blisters or peeling of the skin. Or, you might notice a red rash with swelling of the face, lips or lymph nodes in your neck or under your arms.  Do not treat diarrhea with over the counter products. Contact your doctor if you have diarrhea that lasts more than 2 days or if it is severe and watery.  If you are being treated for a sexually transmitted disease, avoid sexual contact until you have finished your treatment. Having sex can infect your sexual partner.  Calcium may bind to this medicine and cause lung or kidney problems. Avoid calcium products while taking this medicine and for 48 hours after taking the last dose of this medicine.  What side effects may I notice from receiving this medicine?  Side effects that you should report to your doctor or health  "care professional as soon as possible:  · allergic reactions like skin rash, itching or hives, swelling of the face, lips, or tongue  · breathing problems  · fever, chills  · irregular heartbeat  · pain when passing urine  · redness, blistering, peeling, or loosening of the skin, including inside the mouth  · seizures  · stomach pain, cramps  · unusual bleeding, bruising  · unusually weak or tired  Side effects that usually do not require medical attention (report to your doctor or health care professional if they continue or are bothersome):  · diarrhea  · dizzy, drowsy  · headache  · nausea, vomiting  · pain, swelling, irritation where injected  · stomach upset  · sweating  This list may not describe all possible side effects. Call your doctor for medical advice about side effects. You may report side effects to FDA at 4-027-FDA-8638.  Where should I keep my medicine?  Keep out of the reach of children and pets.  You will be instructed on how to store this drug. Protect from light. Throw away any unused drug after the expiration date.  NOTE: This sheet is a summary. It may not cover all possible information. If you have questions about this medicine, talk to your doctor, pharmacist, or health care provider.  © 2021 Elsevier/Gold Standard (2020-07-23 18:29:21)   if you have any problems or concerns.    We know you have a Choice in healthcare and appreciate you using [facility].  Our purpose is to provide you \"Excellent Care\".  We hope that you will always choose us in the future and continue to recommend us to your family and friends.              "

## 2021-07-14 ENCOUNTER — HOSPITAL ENCOUNTER (OUTPATIENT)
Dept: INFUSION THERAPY | Facility: HOSPITAL | Age: 81
Discharge: HOME | End: 2021-07-14
Admitting: UROLOGY

## 2021-07-14 VITALS
HEART RATE: 68 BPM | WEIGHT: 165 LBS | OXYGEN SATURATION: 96 % | RESPIRATION RATE: 16 BRPM | TEMPERATURE: 98.4 F | DIASTOLIC BLOOD PRESSURE: 77 MMHG | SYSTOLIC BLOOD PRESSURE: 144 MMHG | BODY MASS INDEX: 28.32 KG/M2

## 2021-07-14 DIAGNOSIS — N30.20 BLADDER INFECTION, CHRONIC: ICD-10-CM

## 2021-07-14 DIAGNOSIS — N20.2 CALCULUS OF KIDNEY AND URETER: ICD-10-CM

## 2021-07-14 DIAGNOSIS — Z87.440 H/O BLADDER INFECTIONS: Primary | ICD-10-CM

## 2021-07-14 PROCEDURE — 96372 THER/PROPH/DIAG INJ SC/IM: CPT

## 2021-07-14 PROCEDURE — 25010000002 CEFTRIAXONE PER 250 MG: Performed by: UROLOGY

## 2021-07-14 RX ADMIN — LIDOCAINE HYDROCHLORIDE 1 G: 10 INJECTION, SOLUTION EPIDURAL; INFILTRATION; INTRACAUDAL; PERINEURAL at 14:04

## 2021-07-14 NOTE — NURSING NOTE
NURSING PROGRESS NOTE:    PATIENT ARRIVED TO [facility] PER W/C AT 1345 FOR SCHEDULED INJECTION.  PROCEDURE PERFORMED WITHOUT INCIDENT. ESCORTED TO LOBBY PER W/C AND DISCHARGED HOME AT 1410.  DEBO JIMENES

## 2021-07-15 ENCOUNTER — HOSPITAL ENCOUNTER (EMERGENCY)
Facility: HOSPITAL | Age: 81
Discharge: HOME | End: 2021-07-15
Attending: EMERGENCY MEDICINE | Admitting: EMERGENCY MEDICINE

## 2021-07-15 ENCOUNTER — HOSPITAL ENCOUNTER (OUTPATIENT)
Dept: INFUSION THERAPY | Facility: HOSPITAL | Age: 81
Discharge: HOME | End: 2021-07-15
Admitting: UROLOGY

## 2021-07-15 VITALS
OXYGEN SATURATION: 98 % | HEART RATE: 77 BPM | WEIGHT: 160 LBS | BODY MASS INDEX: 30.21 KG/M2 | RESPIRATION RATE: 18 BRPM | DIASTOLIC BLOOD PRESSURE: 108 MMHG | SYSTOLIC BLOOD PRESSURE: 197 MMHG | HEIGHT: 61 IN | TEMPERATURE: 99 F

## 2021-07-15 VITALS
SYSTOLIC BLOOD PRESSURE: 167 MMHG | HEIGHT: 61 IN | RESPIRATION RATE: 16 BRPM | HEART RATE: 76 BPM | OXYGEN SATURATION: 96 % | WEIGHT: 160 LBS | TEMPERATURE: 99.2 F | DIASTOLIC BLOOD PRESSURE: 87 MMHG | BODY MASS INDEX: 30.21 KG/M2

## 2021-07-15 DIAGNOSIS — J02.9 PHARYNGITIS, UNSPECIFIED ETIOLOGY: Primary | ICD-10-CM

## 2021-07-15 DIAGNOSIS — N30.20 BLADDER INFECTION, CHRONIC: ICD-10-CM

## 2021-07-15 DIAGNOSIS — N20.2 CALCULUS OF KIDNEY AND URETER: ICD-10-CM

## 2021-07-15 DIAGNOSIS — Z87.440 H/O BLADDER INFECTIONS: Primary | ICD-10-CM

## 2021-07-15 LAB — S PYO AG THROAT QL: NEGATIVE

## 2021-07-15 PROCEDURE — 99283 EMERGENCY DEPT VISIT LOW MDM: CPT

## 2021-07-15 PROCEDURE — 96372 THER/PROPH/DIAG INJ SC/IM: CPT

## 2021-07-15 PROCEDURE — 99282 EMERGENCY DEPT VISIT SF MDM: CPT | Performed by: EMERGENCY MEDICINE

## 2021-07-15 PROCEDURE — 87880 STREP A ASSAY W/OPTIC: CPT | Performed by: EMERGENCY MEDICINE

## 2021-07-15 PROCEDURE — 25010000002 CEFTRIAXONE PER 250 MG: Performed by: UROLOGY

## 2021-07-15 PROCEDURE — 87081 CULTURE SCREEN ONLY: CPT | Performed by: EMERGENCY MEDICINE

## 2021-07-15 RX ADMIN — LIDOCAINE HYDROCHLORIDE 1 G: 10 INJECTION, SOLUTION EPIDURAL; INFILTRATION; INTRACAUDAL; PERINEURAL at 14:02

## 2021-07-15 NOTE — DISCHARGE INSTRUCTIONS
Please follow-up with your primary care physician as needed.  Return to the emergency room if you develop difficulty swallowing, difficulty breathing, chest pain, shortness breath, nausea and vomiting or for any other concerns.

## 2021-07-15 NOTE — NURSING NOTE
NURSE PROGRESS NOTE    PT. ARRIVED @ [facility] FOR SCHEDULED INJECTION AT 1340 .  INJECTION WAS PERFORMED WITHOUT INCIDENT.  PT. DISCHARGED TO HOME AT 1410.

## 2021-07-15 NOTE — ED PROVIDER NOTES
"Subjective   History of Present Illness  81-year-old female presents to the emergency room with complaint of sore throat.  She says that she woke up this morning with some sore throat and it persisted some throughout the day.  The reason she is here is when she went to urgent care they were concerned that maybe her sore throat was related to Rocephin injection that she got today.  She tells me she has been getting Rocephin injection daily today was day #7 without any problems whatsoever.  She says that recently the last couple days she has awakened with postnasal drip and coughing up some mucus in the mornings.  Today the throat was sore and she thought maybe her uvula was a little bit swollen.  No difficulty swallowing no trouble with her voice.  She took some Tylenol and felt better.  Her daughter convinced her to go to the urgent care and they convinced her to come here.  She denies any hives, denies any rash or itching, denies difficulty breathing, denies nausea or vomiting.  Review of Systems   Constitutional: Negative for chills and fever.   HENT: Positive for congestion, postnasal drip and sore throat. Negative for drooling, facial swelling, sinus pressure, sinus pain, trouble swallowing and voice change.    Respiratory: Negative for choking, chest tightness, shortness of breath and stridor.    Cardiovascular: Negative for chest pain.   Gastrointestinal: Negative for abdominal pain, nausea and vomiting.   Neurological: Negative for dizziness and weakness.       Past Medical History:   Diagnosis Date   • Bladder infection, chronic    • Hyperlipidemia    • Hypertension    • Kidney stone        Allergies   Allergen Reactions   • Penicillins Other (See Comments)     \"Makes me numb\"   • Sulfa Antibiotics Unknown (See Comments)     \"it's been years ago and I don't remember\"   • Ciprofloxacin Anxiety       Past Surgical History:   Procedure Laterality Date   • CAROTID ENDARTERECTOMY      left side       Family " History   Problem Relation Age of Onset   • Stroke Father    • Hypertension Sister    • Stroke Sister    • Heart disease Brother 77   • Hypertension Brother    • Hypertension Sister    • Cancer Sister    • Cancer Brother    • Breast cancer Neg Hx        Social History     Socioeconomic History   • Marital status:      Spouse name: Not on file   • Number of children: Not on file   • Years of education: Not on file   • Highest education level: Not on file   Tobacco Use   • Smoking status: Never Smoker   • Smokeless tobacco: Never Used   • Tobacco comment: caff use   Vaping Use   • Vaping Use: Never used   Substance and Sexual Activity   • Alcohol use: No   • Drug use: No   • Sexual activity: Defer           Objective    ED Triage Vitals [07/15/21 1901]   Temp Heart Rate Resp BP SpO2   99 °F (37.2 °C) 77 18 (!) 197/108 98 %      Temp src Heart Rate Source Patient Position BP Location FiO2 (%)   Oral -- -- -- --       Physical Exam  INITIAL VITAL SIGNS: Reviewed by me.  Pulse ox normal  GENERAL: Alert and interactive. No acute distress.  HEAD: Head is normocephalic.  EYES: EOMI. PERRL. No scleral icterus. No conjunctival injection.  ENT: Moist mucous membranes.  Erythema of the oropharynx without swelling of the uvula  NECK: Supple. Full range of motion.  No stridor  RESPIRATORY: No tachypnea. Clear breath sounds bilaterally. No wheezing. No rales. No rhonchi.  CV: Regular rate and rhythm. No murmurs. No rubs or gallops.   SKIN: Warm and dry. No diaphoresis. No obvious rashes.   NEUROLOGIC: Alert and oriented. Face is symmetric. Speech is normal. Moves all extremities equally. Motor and sensory distally intact.   Results for orders placed or performed during the hospital encounter of 07/15/21   Rapid Strep A Screen - Swab, Throat    Specimen: Throat; Swab   Result Value Ref Range    Strep A Ag Negative Negative       Procedures           ED Course  ED Course as of Jul 15 1953   Thu Jul 15, 2021   1935 Patient  with some sore throat.  No signs whatsoever allergic reaction.  She only went to urgent care at the behest of her daughter and she is only here at the behest of urgent care but without any rash, no nausea or vomiting, no breathing difficulty, no swelling of the throat just some erythema that started this morning in the setting of her having what sounds like some postnasal drip I do not think there is any concern for allergic reaction.  Swabbing the throat but expect this to be normal and she has been taking Rocephin.    [RO]   1952 Rapid strep negative    [RO]      ED Course User Index  [RO] Konstantin Briggs MD                                           [facility] [place]    Final diagnoses:   Pharyngitis, unspecified etiology       ED Disposition  ED Disposition     ED Disposition Condition Comment    Discharge Good           Konstantin Del Valle Jr., MD  [facility] [address]  [phone]    Call   To schedule follow-up appointment, As needed         Medication List      No changes were made to your prescriptions during this visit.          Konstantin Briggs MD  07/15/21 1953

## 2021-07-15 NOTE — PATIENT INSTRUCTIONS
Call  Dr. Saman Salas, [facility] at [phone] Ceftriaxone Injection  What is this medicine?  CEFTRIAXONE (sef try AX one) is a cephalosporin antibiotic. It treats some infections caused by bacteria. It will not work for colds, the flu, or other viruses.  This medicine may be used for other purposes; ask your health care provider or pharmacist if you have questions.  COMMON BRAND NAME(S): Ceftrisol Plus, Rocephin  What should I tell my health care provider before I take this medicine?  They need to know if you have any of these conditions:  · any chronic illness  · bowel disease, like colitis  · both kidney and liver disease  · high bilirubin level in  patients  · an unusual or allergic reaction to ceftriaxone, other cephalosporin or penicillin antibiotics, foods, dyes, or preservatives  · pregnant or trying to get pregnant  · breast-feeding  How should I use this medicine?  This drug is injected into a muscle or a vein. It is usually given by a health care provider in a hospital or clinic setting.  If you get this drug at home, you will be taught how to prepare and give it. Use exactly as directed. Take it as directed on the prescription label at the same time every day. Keep taking it unless your health care provider tells you to stop.  It is important that you put your used needles and syringes in a special sharps container. Do not put them in a trash can. If you do not have a sharps container, call your pharmacist or health care provider to get one.  Talk to your health care provider about the use of this drug in children. While it may be prescribed for children as young as newborns for selected conditions, precautions do apply.  Overdosage: If you think you have taken too much of this medicine contact a poison control center or emergency room at once.  NOTE: This medicine is only for you. Do not share this medicine with others.  What if I miss a dose?  It is important not to miss your  dose. Call your health care provider if you are unable to keep an appointment. If you give yourself this drug at home and you miss a dose, take it as soon as you can. If it is almost time for your next dose, take only that dose. Do not take double or extra doses.  What may interact with this medicine?  Do not take this medicine with any of the following medications:  · intravenous calcium  This medicine may also interact with the following medications:  · birth control pills  This list may not describe all possible interactions. Give your health care provider a list of all the medicines, herbs, non-prescription drugs, or dietary supplements you use. Also tell them if you smoke, drink alcohol, or use illegal drugs. Some items may interact with your medicine.  What should I watch for while using this medicine?  Tell your doctor or health care provider if your symptoms do not improve or if they get worse.  This medicine may cause serious skin reactions. They can happen weeks to months after starting the medicine. Contact your health care provider right away if you notice fevers or flu-like symptoms with a rash. The rash may be red or purple and then turn into blisters or peeling of the skin. Or, you might notice a red rash with swelling of the face, lips or lymph nodes in your neck or under your arms.  Do not treat diarrhea with over the counter products. Contact your doctor if you have diarrhea that lasts more than 2 days or if it is severe and watery.  If you are being treated for a sexually transmitted disease, avoid sexual contact until you have finished your treatment. Having sex can infect your sexual partner.  Calcium may bind to this medicine and cause lung or kidney problems. Avoid calcium products while taking this medicine and for 48 hours after taking the last dose of this medicine.  What side effects may I notice from receiving this medicine?  Side effects that you should report to your doctor or health  "care professional as soon as possible:  · allergic reactions like skin rash, itching or hives, swelling of the face, lips, or tongue  · breathing problems  · fever, chills  · irregular heartbeat  · pain when passing urine  · redness, blistering, peeling, or loosening of the skin, including inside the mouth  · seizures  · stomach pain, cramps  · unusual bleeding, bruising  · unusually weak or tired  Side effects that usually do not require medical attention (report to your doctor or health care professional if they continue or are bothersome):  · diarrhea  · dizzy, drowsy  · headache  · nausea, vomiting  · pain, swelling, irritation where injected  · stomach upset  · sweating  This list may not describe all possible side effects. Call your doctor for medical advice about side effects. You may report side effects to FDA at 8-196-FDA-5524.  Where should I keep my medicine?  Keep out of the reach of children and pets.  You will be instructed on how to store this drug. Protect from light. Throw away any unused drug after the expiration date.  NOTE: This sheet is a summary. It may not cover all possible information. If you have questions about this medicine, talk to your doctor, pharmacist, or health care provider.  © 2021 Elsevier/Gold Standard (2020-07-23 18:29:21)   if you have any problems or concerns.    We know you have a Choice in healthcare and appreciate you using [facility].  Our purpose is to provide you \"Excellent Care\".  We hope that you will always choose us in the future and continue to recommend us to your family and friends.              "

## 2021-07-15 NOTE — ED NOTES
Pt presents to the ER with c/o sore throat since this AM. Today was her 7th and final dose of Rocephin for a uti that she has been coming here for outpatient injections. She went to urgent care this afternoon for her sore throat and was told there that it could be a reaction to the rocephin , although shes been on it for a full week, and that although they couldn't see any swelling in her uvula she was told she could develop swelling in the night so she needs to come to the ER for further evaluation. She took tylenol this afternoon and said that her throat already feels much better. She has no swallowing difficulties, no speach abnormalities, no issues handling secretions, and is in no respiratory distress. Pt is A&Ox4, ambulatory with a steady gait and is in NAD.      Lexi Sheikh, RN  07/15/21 1917

## 2021-07-18 LAB — BACTERIA SPEC AEROBE CULT: NORMAL

## 2021-08-30 ENCOUNTER — TRANSCRIBE ORDERS (OUTPATIENT)
Dept: ADMINISTRATIVE | Facility: HOSPITAL | Age: 81
End: 2021-08-30

## 2021-08-30 DIAGNOSIS — N20.2 CALCULUS OF KIDNEY AND URETER: Primary | ICD-10-CM

## 2021-11-10 ENCOUNTER — TRANSCRIBE ORDERS (OUTPATIENT)
Dept: ADMINISTRATIVE | Facility: HOSPITAL | Age: 81
End: 2021-11-10

## 2021-11-10 DIAGNOSIS — I65.23 CAROTID STENOSIS, BILATERAL: Primary | ICD-10-CM

## 2021-11-22 ENCOUNTER — HOSPITAL ENCOUNTER (OUTPATIENT)
Dept: ULTRASOUND IMAGING | Facility: HOSPITAL | Age: 81
Discharge: HOME | End: 2021-11-22
Admitting: NURSE PRACTITIONER

## 2021-11-22 DIAGNOSIS — I65.23 CAROTID STENOSIS, BILATERAL: ICD-10-CM

## 2021-11-22 PROCEDURE — 93880 EXTRACRANIAL BILAT STUDY: CPT

## 2021-12-02 ENCOUNTER — LAB (OUTPATIENT)
Dept: LAB | Facility: HOSPITAL | Age: 81
End: 2021-12-02

## 2021-12-02 ENCOUNTER — TRANSCRIBE ORDERS (OUTPATIENT)
Dept: ADMINISTRATIVE | Facility: HOSPITAL | Age: 81
End: 2021-12-02

## 2021-12-02 DIAGNOSIS — D69.49 OTHER PRIMARY THROMBOCYTOPENIA (HCC): ICD-10-CM

## 2021-12-02 DIAGNOSIS — I10 ESSENTIAL HYPERTENSION, MALIGNANT: ICD-10-CM

## 2021-12-02 DIAGNOSIS — I10 ESSENTIAL HYPERTENSION, MALIGNANT: Primary | ICD-10-CM

## 2021-12-02 DIAGNOSIS — E11.9 DIABETES MELLITUS WITHOUT COMPLICATION (HCC): ICD-10-CM

## 2021-12-02 DIAGNOSIS — E78.2 MIXED HYPERLIPIDEMIA: ICD-10-CM

## 2021-12-02 LAB
ALBUMIN SERPL-MCNC: 4.7 G/DL (ref 3.5–5.2)
ALBUMIN/GLOB SERPL: 1.4 G/DL
ALP SERPL-CCNC: 102 U/L (ref 39–117)
ALT SERPL W P-5'-P-CCNC: 25 U/L (ref 1–33)
ANION GAP SERPL CALCULATED.3IONS-SCNC: 8.3 MMOL/L (ref 5–15)
AST SERPL-CCNC: 23 U/L (ref 1–32)
BASOPHILS # BLD AUTO: 0.05 10*3/MM3 (ref 0–0.2)
BASOPHILS NFR BLD AUTO: 0.9 % (ref 0–1.5)
BILIRUB SERPL-MCNC: 1 MG/DL (ref 0–1.2)
BUN SERPL-MCNC: 15 MG/DL (ref 8–23)
BUN/CREAT SERPL: 14.7 (ref 7–25)
CALCIUM SPEC-SCNC: 10.6 MG/DL (ref 8.6–10.5)
CHLORIDE SERPL-SCNC: 102 MMOL/L (ref 98–107)
CHOLEST SERPL-MCNC: 142 MG/DL (ref 0–200)
CO2 SERPL-SCNC: 25.7 MMOL/L (ref 22–29)
CREAT SERPL-MCNC: 1.02 MG/DL (ref 0.57–1)
DEPRECATED RDW RBC AUTO: 43.2 FL (ref 37–54)
EOSINOPHIL # BLD AUTO: 0.25 10*3/MM3 (ref 0–0.4)
EOSINOPHIL NFR BLD AUTO: 4.5 % (ref 0.3–6.2)
ERYTHROCYTE [DISTWIDTH] IN BLOOD BY AUTOMATED COUNT: 13.5 % (ref 12.3–15.4)
GFR SERPL CREATININE-BSD FRML MDRD: 52 ML/MIN/1.73
GLOBULIN UR ELPH-MCNC: 3.4 GM/DL
GLUCOSE SERPL-MCNC: 125 MG/DL (ref 65–99)
HBA1C MFR BLD: 6.35 % (ref 4.8–5.6)
HCT VFR BLD AUTO: 46.2 % (ref 34–46.6)
HDLC SERPL-MCNC: 33 MG/DL (ref 40–60)
HGB BLD-MCNC: 15.5 G/DL (ref 12–15.9)
IMM GRANULOCYTES # BLD AUTO: 0.02 10*3/MM3 (ref 0–0.05)
IMM GRANULOCYTES NFR BLD AUTO: 0.4 % (ref 0–0.5)
LDLC SERPL CALC-MCNC: 75 MG/DL (ref 0–100)
LDLC/HDLC SERPL: 2.08 {RATIO}
LYMPHOCYTES # BLD AUTO: 0.65 10*3/MM3 (ref 0.7–3.1)
LYMPHOCYTES NFR BLD AUTO: 11.6 % (ref 19.6–45.3)
MCH RBC QN AUTO: 29.8 PG (ref 26.6–33)
MCHC RBC AUTO-ENTMCNC: 33.5 G/DL (ref 31.5–35.7)
MCV RBC AUTO: 88.8 FL (ref 79–97)
MONOCYTES # BLD AUTO: 0.45 10*3/MM3 (ref 0.1–0.9)
MONOCYTES NFR BLD AUTO: 8 % (ref 5–12)
NEUTROPHILS NFR BLD AUTO: 4.18 10*3/MM3 (ref 1.7–7)
NEUTROPHILS NFR BLD AUTO: 74.6 % (ref 42.7–76)
NRBC BLD AUTO-RTO: 0 /100 WBC (ref 0–0.2)
PLATELET # BLD AUTO: 128 10*3/MM3 (ref 140–450)
PMV BLD AUTO: 11.6 FL (ref 6–12)
POTASSIUM SERPL-SCNC: 4.1 MMOL/L (ref 3.5–5.2)
PROT SERPL-MCNC: 8.1 G/DL (ref 6–8.5)
RBC # BLD AUTO: 5.2 10*6/MM3 (ref 3.77–5.28)
SODIUM SERPL-SCNC: 136 MMOL/L (ref 136–145)
TRIGL SERPL-MCNC: 201 MG/DL (ref 0–150)
VLDLC SERPL-MCNC: 34 MG/DL (ref 5–40)
WBC NRBC COR # BLD: 5.6 10*3/MM3 (ref 3.4–10.8)

## 2021-12-02 PROCEDURE — 83036 HEMOGLOBIN GLYCOSYLATED A1C: CPT

## 2021-12-02 PROCEDURE — 85025 COMPLETE CBC W/AUTO DIFF WBC: CPT

## 2021-12-02 PROCEDURE — 80061 LIPID PANEL: CPT

## 2021-12-02 PROCEDURE — 36415 COLL VENOUS BLD VENIPUNCTURE: CPT

## 2021-12-02 PROCEDURE — 80053 COMPREHEN METABOLIC PANEL: CPT

## 2021-12-27 ENCOUNTER — HOSPITAL ENCOUNTER (OUTPATIENT)
Dept: ULTRASOUND IMAGING | Facility: HOSPITAL | Age: 81
Discharge: HOME | End: 2021-12-27
Admitting: UROLOGY

## 2021-12-27 ENCOUNTER — TRANSCRIBE ORDERS (OUTPATIENT)
Dept: ADMINISTRATIVE | Facility: HOSPITAL | Age: 81
End: 2021-12-27

## 2021-12-27 ENCOUNTER — LAB (OUTPATIENT)
Dept: LAB | Facility: HOSPITAL | Age: 81
End: 2021-12-27

## 2021-12-27 DIAGNOSIS — N20.2 CALCULUS OF KIDNEY AND URETER: ICD-10-CM

## 2021-12-27 DIAGNOSIS — N20.2 CALCULUS OF KIDNEY AND URETER: Primary | ICD-10-CM

## 2021-12-27 LAB
ALBUMIN SERPL-MCNC: 4.8 G/DL (ref 3.5–5.2)
ALBUMIN/GLOB SERPL: 1.5 G/DL
ALP SERPL-CCNC: 97 U/L (ref 39–117)
ALT SERPL W P-5'-P-CCNC: 27 U/L (ref 1–33)
ANION GAP SERPL CALCULATED.3IONS-SCNC: 11.5 MMOL/L (ref 5–15)
AST SERPL-CCNC: 28 U/L (ref 1–32)
BILIRUB SERPL-MCNC: 0.6 MG/DL (ref 0–1.2)
BUN SERPL-MCNC: 17 MG/DL (ref 8–23)
BUN/CREAT SERPL: 17.2 (ref 7–25)
CALCIUM SPEC-SCNC: 10.2 MG/DL (ref 8.6–10.5)
CHLORIDE SERPL-SCNC: 101 MMOL/L (ref 98–107)
CO2 SERPL-SCNC: 25.5 MMOL/L (ref 22–29)
CREAT SERPL-MCNC: 0.99 MG/DL (ref 0.57–1)
GFR SERPL CREATININE-BSD FRML MDRD: 54 ML/MIN/1.73
GLOBULIN UR ELPH-MCNC: 3.2 GM/DL
GLUCOSE SERPL-MCNC: 197 MG/DL (ref 65–99)
POTASSIUM SERPL-SCNC: 4.1 MMOL/L (ref 3.5–5.2)
PROT SERPL-MCNC: 8 G/DL (ref 6–8.5)
SODIUM SERPL-SCNC: 138 MMOL/L (ref 136–145)

## 2021-12-27 PROCEDURE — 76775 US EXAM ABDO BACK WALL LIM: CPT

## 2021-12-27 PROCEDURE — 36415 COLL VENOUS BLD VENIPUNCTURE: CPT

## 2021-12-27 PROCEDURE — 80053 COMPREHEN METABOLIC PANEL: CPT

## 2022-04-25 ENCOUNTER — OFFICE VISIT (OUTPATIENT)
Dept: ORTHOPEDIC SURGERY | Facility: CLINIC | Age: 82
End: 2022-04-25

## 2022-04-25 VITALS — BODY MASS INDEX: 30.21 KG/M2 | HEIGHT: 61 IN | WEIGHT: 160 LBS

## 2022-04-25 DIAGNOSIS — M17.12 PRIMARY OSTEOARTHRITIS OF LEFT KNEE: Primary | ICD-10-CM

## 2022-04-25 PROCEDURE — 73562 X-RAY EXAM OF KNEE 3: CPT | Performed by: NURSE PRACTITIONER

## 2022-04-25 PROCEDURE — 99213 OFFICE O/P EST LOW 20 MIN: CPT | Performed by: NURSE PRACTITIONER

## 2022-04-25 PROCEDURE — 20610 DRAIN/INJ JOINT/BURSA W/O US: CPT | Performed by: NURSE PRACTITIONER

## 2022-04-25 RX ORDER — TRIAMCINOLONE ACETONIDE 40 MG/ML
80 INJECTION, SUSPENSION INTRA-ARTICULAR; INTRAMUSCULAR
Status: COMPLETED | OUTPATIENT
Start: 2022-04-25 | End: 2022-04-25

## 2022-04-25 RX ORDER — LIDOCAINE HYDROCHLORIDE 10 MG/ML
8 INJECTION, SOLUTION EPIDURAL; INFILTRATION; INTRACAUDAL; PERINEURAL
Status: COMPLETED | OUTPATIENT
Start: 2022-04-25 | End: 2022-04-25

## 2022-04-25 RX ADMIN — TRIAMCINOLONE ACETONIDE 80 MG: 40 INJECTION, SUSPENSION INTRA-ARTICULAR; INTRAMUSCULAR at 14:03

## 2022-04-25 RX ADMIN — LIDOCAINE HYDROCHLORIDE 8 ML: 10 INJECTION, SOLUTION EPIDURAL; INFILTRATION; INTRACAUDAL; PERINEURAL at 14:03

## 2022-04-25 NOTE — PROGRESS NOTES
Subjective:     Patient ID: Danuta Batista is a 81 y.o. female.    Chief Complaint:  Follow-up DJD left knee  History of Present Illness  Danuta Batista returns to clinic for follow-up of her left knee.  Was last seen over 1 year ago was received corticosteroid injection with significant symptom relief until recently.  Localizes pain to the medial joint line as well as the anterior aspect at the patella.  Pain has progressively gotten worse with the left knee is experiencing discomfort with transitional activity such as seated standing attempting to walk and with extension of the knee.  Denies any recent corticosteroid injections.  Denies any recent viscosupplementation injections.  She has continued ambulating down her driveway for exercise with a cane tolerating fairly well.  Denies other concerns present this time.    Social History     Occupational History   • Not on file   Tobacco Use   • Smoking status: Never Smoker   • Smokeless tobacco: Never Used   • Tobacco comment: caff use   Vaping Use   • Vaping Use: Never used   Substance and Sexual Activity   • Alcohol use: No   • Drug use: No   • Sexual activity: Defer      Past Medical History:   Diagnosis Date   • Bladder infection, chronic    • Hyperlipidemia    • Hypertension    • Kidney stone      Past Surgical History:   Procedure Laterality Date   • CAROTID ENDARTERECTOMY      left side       Family History   Problem Relation Age of Onset   • Stroke Father    • Hypertension Sister    • Stroke Sister    • Heart disease Brother 77   • Hypertension Brother    • Hypertension Sister    • Cancer Sister    • Cancer Brother    • Breast cancer Neg Hx        Objective:  Physical Exam    General: No acute distress.  Eyes: conjunctiva clear; pupils equally round and reactive  ENT: external ears and nose atraumatic; oropharynx clear  CV: no peripheral edema  Resp: normal respiratory effort  Skin: no rashes or wounds; normal turgor  Psych: mood and affect appropriate; recent and  "remote memory intact    Vitals:    04/25/22 1332   Weight: 72.6 kg (160 lb)   Height: 153.7 cm (60.5\")         04/25/22  1332   Weight: 72.6 kg (160 lb)     Body mass index is 30.73 kg/m².      Left Knee Exam     Tenderness   The patient is experiencing tenderness in the patella and medial joint line.    Range of Motion   Extension: 10   Left knee flexion: 125.     Tests   Ayla:  Medial - positive Lateral - negative  Varus: negative Valgus: negative  Lachman:  Anterior - 1+    Posterior - negative  Drawer:  Anterior - negative     Posterior - negative  Patellar apprehension: positive    Other   Erythema: absent  Sensation: normal  Pulse: present  Swelling: moderate  Effusion: no effusion present    Comments:  Positive crepitus throughout arc of motion  Positive tenderness the medial and lateral patellar facet                 Imaging:  Left Knee X-Ray  Indication: Pain    AP, Lateral, and [address] views    Findings:  No fracture  No bony lesion  Normal soft tissues  Advanced medial compartment narrowing with bone-on-bone articulation, moderately advanced patellofemoral osteoarthritis with near bone-on-bone articulation, posterior joint line degeneration noted reactive osteophytes in all 3 compartments    prior studies were available for comparison.    Assessment:        1. Primary osteoarthritis of left knee           Plan:  Large Joint Arthrocentesis: L knee  Date/Time: 4/25/2022 2:03 PM  Consent given by: patient  Site marked: site marked  Timeout: Immediately prior to procedure a time out was called to verify the correct patient, procedure, equipment, support staff and site/side marked as required   Supporting Documentation  Indications: pain   Procedure Details  Location: knee - L knee  Preparation: Patient was prepped and draped in the usual sterile fashion  Needle size: 22 G  Approach: superior  Medications administered: 8 mL lidocaine PF 1% 1 %; 80 mg triamcinolone acetonide 40 MG/ML  Patient tolerance: " patient tolerated the procedure well with no immediate complications          1. Discussed treatment options with patient.  Discussed total knee arthroplasty versus corticosteroid injection to the knee.  Given her significant symptom relief with prior corticosteroid injections we will hold off on any surgical intervention.  Wishes to proceed with corticosteroid injection left knee.  Discussed application of ice this evening.  Plan to see her back in clinic 4 weeks to reevaluate.  Discussed with patient to work on range of motion including extension to regain range of motion.  All questions answered.  Orders:  Orders Placed This Encounter   Procedures   • XR Knee 3 View Left     No orders of the defined types were placed in this encounter.          I ordered and reviewed the MARY KAY today.     Dragon Dictation utilized

## 2022-05-06 ENCOUNTER — TELEPHONE (OUTPATIENT)
Dept: ORTHOPEDIC SURGERY | Facility: CLINIC | Age: 82
End: 2022-05-06

## 2022-05-06 NOTE — TELEPHONE ENCOUNTER
Per Joanna she recommends the RICE method- attempted to contact patient x 3 phone busy all 3 times.

## 2022-05-06 NOTE — TELEPHONE ENCOUNTER
"  Caller: Danuta Batista    Relationship: Self    Best call back number: [phone]     Who are you requesting to speak with (clinical staff, provider, specific staff member): JULEE SHIPLEY OR CLINICAL STAFF    What was the call regarding: PATIENT STATED JULEE SHIPLEY WANTED PATIENT TO CALL BACK AFTER 04/25/22 LEFT KNEE CORTISONE INJECTION TO REPORT HOW PATIENT WAS DOING     PATIENT STATED THE MOST RECENT 04/25/22 INJECTION \"GIVEN HIGHER ON THE OUTSIDE OF LEFT KNEE TOWARDS TOP OF KNEECAP\" DID NOT WORK WELL -     PATIENT HAS A \"STINGING FEELING & ACHE ABOVE & BELOW KNEECAP\" WHICH HAS CONTINUED SINCE THE SAT 04/23/22 BEFORE HER MONDAY 04/25 CORTISONE INJECTION     -PATIENT REPORTS THE BACK OF HER LEFT KNEE \"FEELS REALLY FULL OF FLUID\"     -ICING HAS HELPED w/THE PAIN, BUT STILL HARD TO SLEEP. PATIENT ONLY TAKING TYLENOL ARTHRITIS 650 MG IN ADDITION TO HER PRESCRIBED MEDICATIONS     PATIENT HAS 4 WEEK LEFT KNEE FOLLOW UP SCHEDULED w/JULEE SHIPLEY ON 05/23/22 @ 6160    Do you require a callback: YES     PLEASE CALL TO DISCUSS SYMPTOMS & NEXT STEPS     THANKS   "

## 2022-05-15 ENCOUNTER — HOSPITAL ENCOUNTER (EMERGENCY)
Facility: HOSPITAL | Age: 82
Discharge: HOME | End: 2022-05-15
Attending: EMERGENCY MEDICINE | Admitting: EMERGENCY MEDICINE

## 2022-05-15 ENCOUNTER — APPOINTMENT (OUTPATIENT)
Dept: GENERAL RADIOLOGY | Facility: HOSPITAL | Age: 82
End: 2022-05-15

## 2022-05-15 VITALS
OXYGEN SATURATION: 98 % | HEART RATE: 80 BPM | TEMPERATURE: 99 F | SYSTOLIC BLOOD PRESSURE: 160 MMHG | WEIGHT: 160 LBS | BODY MASS INDEX: 26.66 KG/M2 | HEIGHT: 65 IN | RESPIRATION RATE: 16 BRPM | DIASTOLIC BLOOD PRESSURE: 92 MMHG

## 2022-05-15 DIAGNOSIS — M54.50 ACUTE MIDLINE LOW BACK PAIN WITHOUT SCIATICA: Primary | ICD-10-CM

## 2022-05-15 PROCEDURE — 99282 EMERGENCY DEPT VISIT SF MDM: CPT | Performed by: EMERGENCY MEDICINE

## 2022-05-15 PROCEDURE — 99283 EMERGENCY DEPT VISIT LOW MDM: CPT

## 2022-05-15 PROCEDURE — 72110 X-RAY EXAM L-2 SPINE 4/>VWS: CPT

## 2022-05-15 NOTE — ED PROVIDER NOTES
"Subjective   Danuta Batista is an 82-year-old white female who presents secondary to low back pain secondary to fall.  Patient states approximately 8: 30 AM this morning she lost her balance and fell backwards in her home.  Impact on her buttocks.  EMS was called to the scene.  Patient was ambulatory and not feeling significant pain at that time.  Thus she declined offer for evaluation.  However patient reports pain in her low back and buttocks have increased slightly over the course of the morning.  She states \"I did not want to just sit there and worry\".  She called EMS and was transported to the ED for evaluation.  Patient declines head injury.  No loss of consciousness.  No other injuries.  Patient presents for evaluation.      History provided by:  Patient      Review of Systems   Constitutional: Negative.  Negative for fever.   HENT: Negative.  Negative for rhinorrhea.    Eyes: Negative.  Negative for redness.   Respiratory: Negative for cough.    Cardiovascular: Negative for chest pain.   Gastrointestinal: Negative for abdominal pain.   Endocrine: Negative.    Genitourinary: Negative.  Negative for difficulty urinating.   Musculoskeletal: Positive for arthralgias (Knees). Negative for back pain.   Skin: Negative.  Negative for color change.   Neurological: Negative.  Negative for syncope.   Hematological: Negative.    Psychiatric/Behavioral: Negative.    All other systems reviewed and are negative.      Past Medical History:   Diagnosis Date   • Bladder infection, chronic    • Hyperlipidemia    • Hypertension    • Kidney stone        Allergies   Allergen Reactions   • Penicillins Other (See Comments)     \"Makes me numb\"   • Sulfa Antibiotics Unknown (See Comments)     \"it's been years ago and I don't remember\"   • Ciprofloxacin Anxiety       Past Surgical History:   Procedure Laterality Date   • CAROTID ENDARTERECTOMY      left side       Family History   Problem Relation Age of Onset   • Stroke Father    • " Hypertension Sister    • Stroke Sister    • Heart disease Brother 77   • Hypertension Brother    • Hypertension Sister    • Cancer Sister    • Cancer Brother    • Breast cancer Neg Hx        Social History     Socioeconomic History   • Marital status:    Tobacco Use   • Smoking status: Never Smoker   • Smokeless tobacco: Never Used   • Tobacco comment: caff use   Vaping Use   • Vaping Use: Never used   Substance and Sexual Activity   • Alcohol use: No   • Drug use: No   • Sexual activity: Defer           Objective   Physical Exam  Vitals and nursing note reviewed.   Constitutional:       General: She is not in acute distress.     Appearance: Normal appearance. She is well-developed. She is not ill-appearing, toxic-appearing or diaphoretic.      Comments: 82-year-old white female sitting in bed.  Patient is a bit overweight.  She otherwise appears in good health.  Vital signs unremarkable.  Patient friendly and cooperative.   HENT:      Head: Normocephalic and atraumatic.      Right Ear: Tympanic membrane, ear canal and external ear normal.      Left Ear: Tympanic membrane, ear canal and external ear normal.      Nose: Nose normal.      Mouth/Throat:      Mouth: Mucous membranes are moist.      Pharynx: Oropharynx is clear.   Eyes:      Extraocular Movements: Extraocular movements intact.      Conjunctiva/sclera: Conjunctivae normal.      Pupils: Pupils are equal, round, and reactive to light.   Cardiovascular:      Rate and Rhythm: Normal rate and regular rhythm.      Pulses: Normal pulses.      Heart sounds: Normal heart sounds. No murmur heard.    No friction rub. No gallop.   Pulmonary:      Effort: Pulmonary effort is normal.      Breath sounds: Normal breath sounds.   Abdominal:      General: Bowel sounds are normal. There is no distension.      Palpations: Abdomen is soft. There is no mass.      Tenderness: There is no abdominal tenderness. There is no guarding or rebound.      Hernia: No hernia is  present.   Musculoskeletal:         General: Signs of injury present. Normal range of motion.      Left elbow: No swelling, deformity or effusion. Normal range of motion. No tenderness.        Arms:       Cervical back: Normal, normal range of motion and neck supple.      Thoracic back: Normal.      Lumbar back: No edema or bony tenderness. Normal range of motion. No scoliosis.        Back:    Skin:     General: Skin is warm and dry.      Capillary Refill: Capillary refill takes less than 2 seconds.   Neurological:      General: No focal deficit present.      Mental Status: She is alert and oriented to person, place, and time.      Deep Tendon Reflexes: Reflexes are normal and symmetric.   Psychiatric:         Mood and Affect: Mood normal.         Behavior: Behavior normal.         Procedures           ED Course  ED Course as of 05/20/22 1518   Sun May 15, 2022   1236 X-rays show arthritic changes and degenerative disc disease.  No sign of acute injury.  Discussed at length with patient results, diagnoses and treatment.  Will DC home. [SS]      ED Course User Index  [SS] Rajiv Yi MD      XR Knee 3 View Left    Result Date: 4/25/2022  Impression: Ordering physician's impression is located in the Encounter Note dated 04/25/22.     XR Spine Lumbar Complete 4+VW    Result Date: 5/15/2022  Narrative: CR Spine Lumbar Min 4 Vws INDICATION: Lumbar pain after falling today COMPARISON: None available. FINDINGS: AP, lateral, bilateral oblique and L5-S1 spot views of the lumbar spine. 5 views total. There is mild to moderate disc space narrowing at the upper lumbar discs with osteophyte formation and mild facet arthropathy. At L3-4 there is a grade 1 anterolisthesis with moderate facet disease on the left and more advanced facet disease on the right. The disc is moderately degenerated. At L4-5 there is disc space collapse with moderate bilateral facet disease and osteophyte formation. At L5-S1 there is disc space  collapse with moderate facet hypertrophy and moderate disc space narrowing. No acute fractures are seen. No destructive bone lesions are noted.     Impression: Degenerative disc and facet disease. No fracture seen. Signer Name: Konstantin Espinoza MD  Signed: 5/15/2022 12:20 PM  Workstation Name: [id number]  [facility]    My differential diagnosis for back pain includes but is not limited to:  Musculoskeletal strain, contusion, retroperitoneal hematoma, disc protrusion, vertebral fracture, transverse process fracture, rib fracture, facet syndrome, sacroiliac joint strain, sciatica, renal injury, splenic injury, pancreatic injury, osteoarthritis, lumbar spondylosis, spinal stenosis, ankylosing spondylitis, sacroiliac joint inflammation, pancreatitis, perforated peptic ulcer, diverticulitis, endometriosis, chronic PID, epidural abscess, osteomyelitis, retroperitoneal abscess, pyelonephritis, pneumonia, subphrenic abscess, tuberculosis, neurofibroma, meningioma, multiple myeloma, lymphoma, metastatic cancer, primary cancer, AAA, aortic dissection, spinal ischemia, referred pain, ureterolithiasis                                             MDM    Final diagnoses:   Acute midline low back pain without sciatica       ED Disposition  ED Disposition     ED Disposition   Discharge    Condition   Stable    Comment   --             Konstantin Del Valle Jr., MD  [address] [address]  [phone]    Schedule an appointment as soon as possible for a visit in 1 week  for continued or worsened symptoms, Sooner if needed         Medication List      No changes were made to your prescriptions during this visit.          Rajiv Yi MD  05/15/22 1233       Rajiv Yi MD  05/20/22 8193

## 2022-05-15 NOTE — DISCHARGE INSTRUCTIONS
Tylenol as needed for pain.  Apply ice for pain and swelling.  Apply heat to affected area.  Gentle stretching.  Follow-up with your PCP or spine specialist as above, sooner if needed.  Return to ED for worsening symptoms, medical emergencies.

## 2022-05-23 ENCOUNTER — OFFICE VISIT (OUTPATIENT)
Dept: ORTHOPEDIC SURGERY | Facility: CLINIC | Age: 82
End: 2022-05-23

## 2022-05-23 VITALS — BODY MASS INDEX: 26.66 KG/M2 | HEIGHT: 65 IN | WEIGHT: 160 LBS

## 2022-05-23 DIAGNOSIS — M25.562 CHRONIC PAIN OF LEFT KNEE: Primary | ICD-10-CM

## 2022-05-23 DIAGNOSIS — M76.899 QUADRICEPS TENDONITIS: ICD-10-CM

## 2022-05-23 DIAGNOSIS — G89.29 CHRONIC PAIN OF LEFT KNEE: Primary | ICD-10-CM

## 2022-05-23 DIAGNOSIS — M17.12 PRIMARY OSTEOARTHRITIS OF LEFT KNEE: ICD-10-CM

## 2022-05-23 PROCEDURE — 73562 X-RAY EXAM OF KNEE 3: CPT | Performed by: NURSE PRACTITIONER

## 2022-05-23 PROCEDURE — 99214 OFFICE O/P EST MOD 30 MIN: CPT | Performed by: NURSE PRACTITIONER

## 2022-05-23 NOTE — PROGRESS NOTES
Subjective:     Patient ID: Danuta Batista is a 82 y.o. female.    Chief Complaint:  Follow-up DJD left knee  History of Present Illness  Danuta Batista returns to clinic for follow-up of her left lower extremity.  Notes slight improvement with weightbearing activities left lower extremity for still continues to rate injection.  She does report recent onset of injury when she fell backward onto 5/15/2022 presented to [facility] ER.  Denies knowledge of injury to the knee but since that time has been experiencing pain along the superior aspect of the patella with pain radiating into the thigh.  Denies any recent x-rays of her knees since we did x-ray imaging in clinic last visit.  She does report an injury to the thigh in 2020 when she fell missing a step at home outdoors injuring the left lower extremity.  Did not experience any pain was able to bear weight comfortably after that time without any concerns.  She does report significant decreased strength of the left upper extremity utilizing the upper thigh when attempting to complete a straight leg raise.  Denies other concerns present time.    Social History     Occupational History   • Not on file   Tobacco Use   • Smoking status: Never Smoker   • Smokeless tobacco: Never Used   • Tobacco comment: caff use   Vaping Use   • Vaping Use: Never used   Substance and Sexual Activity   • Alcohol use: No   • Drug use: No   • Sexual activity: Defer      Past Medical History:   Diagnosis Date   • Bladder infection, chronic    • Hyperlipidemia    • Hypertension    • Kidney stone      Past Surgical History:   Procedure Laterality Date   • CAROTID ENDARTERECTOMY      left side       Family History   Problem Relation Age of Onset   • Stroke Father    • Hypertension Sister    • Stroke Sister    • Heart disease Brother 77   • Hypertension Brother    • Hypertension Sister    • Cancer Sister    • Cancer Brother    • Breast cancer Neg Hx                Objective:  Physical  "Exam    General: No acute distress.  Eyes: conjunctiva clear; pupils equally round and reactive  ENT: external ears and nose atraumatic; oropharynx clear  CV: no peripheral edema  Resp: normal respiratory effort  Skin: no rashes or wounds; normal turgor  Psych: mood and affect appropriate; recent and remote memory intact    Vitals:    05/23/22 1326   Weight: 72.6 kg (160 lb)   Height: 165.1 cm (65\")         05/23/22  1326   Weight: 72.6 kg (160 lb)     Body mass index is 26.63 kg/m².      Ortho Exam       Left Knee Exam      Tenderness   The patient is experiencing tenderness distal quadriceps tendon      Range of Motion   Extension: 10   Left knee flexion: 125.      Tests   Ayla:  Medial - positive Lateral - negative  Varus: negative Valgus: negative  Lachman:  Anterior - 1+    Posterior - negative  Drawer:  Anterior - negative     Posterior - negative  Patellar apprehension: positive     Other   Erythema: absent  Sensation: normal  Pulse: present  Swelling: moderate  Effusion: no effusion present     Comments:    Positive crepitus throughout arc of motion  Positive tenderness the medial and lateral patellar facet  Sluggish straight leg raise compared to contralateral side  Negative logroll exam  Negative Stinchfield exam    Imaging:  Left Knee X-Ray  Indication: Pain    AP, Lateral, and [place] views    Findings:  No fracture  No bony lesion  Normal soft tissues  Advanced medial compartment narrowing with bone-on-bone articulation, moderately advanced patellofemoral osteoarthritis with near bone-on-bone articulation, posterior joint line degeneration noted reactive osteophytes in all 3 compartments  prior studies were available for comparison.    Assessment:        1. Chronic pain of left knee    2. Primary osteoarthritis of left knee    3. Quadriceps tendonitis           Plan:  1. Discussed plan of care with patient.  We will proceed with MRI left knee to evaluate for tendon injury.  We will plan to see her " back in clinic after completion of testing discussed results and further plan of care.  Did discuss the option of viscosupplementation injections also at the left knee.  We will hold off at this time plan to see her back in clinic after imaging.  All questions answered.  Orders:  Orders Placed This Encounter   Procedures   • XR Knee 3 View Left   • MRI Knee Left Without Contrast     No orders of the defined types were placed in this encounter.        Dragon dictation utilized.

## 2022-06-15 ENCOUNTER — HOSPITAL ENCOUNTER (OUTPATIENT)
Dept: MRI IMAGING | Facility: HOSPITAL | Age: 82
End: 2022-06-15

## 2022-06-20 ENCOUNTER — HOSPITAL ENCOUNTER (OUTPATIENT)
Dept: MRI IMAGING | Facility: HOSPITAL | Age: 82
Discharge: HOME | End: 2022-06-20
Admitting: NURSE PRACTITIONER

## 2022-06-20 DIAGNOSIS — M76.899 QUADRICEPS TENDONITIS: ICD-10-CM

## 2022-06-20 PROCEDURE — 73721 MRI JNT OF LWR EXTRE W/O DYE: CPT

## 2022-06-23 ENCOUNTER — TRANSCRIBE ORDERS (OUTPATIENT)
Dept: ADMINISTRATIVE | Facility: HOSPITAL | Age: 82
End: 2022-06-23

## 2022-06-23 DIAGNOSIS — N20.0 RENAL CALCULUS: Primary | ICD-10-CM

## 2022-07-05 ENCOUNTER — HOSPITAL ENCOUNTER (OUTPATIENT)
Dept: ULTRASOUND IMAGING | Facility: HOSPITAL | Age: 82
Discharge: HOME | End: 2022-07-05
Admitting: UROLOGY

## 2022-07-05 DIAGNOSIS — N20.0 RENAL CALCULUS: ICD-10-CM

## 2022-07-05 PROCEDURE — 76775 US EXAM ABDO BACK WALL LIM: CPT

## 2022-07-07 ENCOUNTER — TELEPHONE (OUTPATIENT)
Dept: ORTHOPEDIC SURGERY | Facility: CLINIC | Age: 82
End: 2022-07-07

## 2022-07-07 ENCOUNTER — TRANSCRIBE ORDERS (OUTPATIENT)
Dept: ADMINISTRATIVE | Facility: HOSPITAL | Age: 82
End: 2022-07-07

## 2022-07-07 DIAGNOSIS — N20.0 CALCULUS, RENAL: Primary | ICD-10-CM

## 2022-07-07 NOTE — TELEPHONE ENCOUNTER
MRI Knee Left Without Contrast    Result Date: 6/21/2022  MRI Knee LT WO INDICATION:  Diffuse left knee pain for 5 months, worse over the last 2 months. No prior left knee surgery. TECHNIQUE: MRI of the left knee without IV contrast. COMPARISON: None available. FINDINGS: Menisci: Macerated, degenerative-type tear throughout the majority of the body segment medial meniscus with possible superimposed complete vertical radial tear. Horizontal oblique tear extends into the posterior horn of the medial meniscus which contacts both articular surfaces and the free margin. Lateral meniscus appears intact. Ligaments:    Cruciate ligaments:  ACL appears grossly intact. PCL intact.    Medial collateral ligament complex:  Intact.    Lateral collateral ligament complex:  Intact. Extensor mechanism: Intact. Fluid: Small joint effusion. There are 2 loose bodies located along the far posterior nonweightbearing aspect of the medial femoral condyle, measuring 1.7 cm and 1.3 cm respectively. No significant popliteal cyst. Articular cartilage:    Patellofemoral compartment:  Multifocal moderate to high-grade articular cartilage loss throughout the patellofemoral joint.    Medial compartment:  Extensive full-thickness articular cartilage loss throughout the medial compartment. Medial compartment marginal osteophytes.    Lateral compartment: No hyaline cartilage disease. Small lateral compartment marginal osteophytes. Osseous structures and musculature:  Remainder of the bone marrow signal is within expected limits. Visualized musculature is within normal limits.     1. Complex tears of the posterior horn and body segment medial meniscus, detailed above. 2. Extensor mechanism, cruciate ligaments, collateral ligaments, and lateral meniscus appear intact. 3. Tricompartmental arthrosis, detailed above, most severe in the medial compartment. 4. Small joint effusion with 2 loose bodies located along the far posterior nonweightbearing aspect  of the medial femoral condyle, measuring 1.7 cm and 1.3 cm respectively. Signer Name: Berto Mason MD  Signed: 6/21/2022 2:12 PM  Workstation Name: [id number]  [facility] Renal Bilateral    Result Date: 7/5/2022  BILATERAL RENAL ULTRASOUND, 07/05/2022  HISTORY: Renal stones follow-up  COMPARISON: 12/27/2021  TECHNIQUE: Grayscale ultrasound imaging of both kidneys and the urinary bladder was performed.  FINDINGS: The bladder is adequately distended. The wall may be slightly thickened but otherwise appears normal.  The right kidney is 13.8 cm in length. There is no hydronephrosis. There are small cysts measuring up to 3.5 cm in diameter. They all appear to be simple cysts. There is a right renal stone measuring 8 mm in diameter in the superior portion of the kidney.  Left kidney has a large lower pole cyst measuring 8 cm in diameter. Is a lower pole stone measuring 6 mm in diameter. The left kidney is 10.9 cm in length and there is no hydronephrosis.      No change in bilateral renal cysts  There is a nonobstructing upper pole stone on the right and a nonobstructing lower pole stone on the left visualized on today's studies.  There is no hydronephrosis  This report was finalized on 7/5/2022 10:15 AM by Dr. Kamlesh Oneill MD.      Patient discussed MRI results discussed corticosteroid injections in future. At this time she is improving we will plan to see her back in clinic if symptoms get worse.  All questions answered.

## 2022-07-22 ENCOUNTER — OFFICE VISIT (OUTPATIENT)
Dept: ORTHOPEDIC SURGERY | Facility: CLINIC | Age: 82
End: 2022-07-22

## 2022-07-22 VITALS
WEIGHT: 160 LBS | RESPIRATION RATE: 16 BRPM | HEIGHT: 65 IN | DIASTOLIC BLOOD PRESSURE: 76 MMHG | BODY MASS INDEX: 26.66 KG/M2 | HEART RATE: 73 BPM | SYSTOLIC BLOOD PRESSURE: 176 MMHG

## 2022-07-22 DIAGNOSIS — M25.561 CHRONIC PAIN OF RIGHT KNEE: ICD-10-CM

## 2022-07-22 DIAGNOSIS — M17.0 PRIMARY OSTEOARTHRITIS OF KNEES, BILATERAL: Primary | ICD-10-CM

## 2022-07-22 DIAGNOSIS — G89.29 CHRONIC PAIN OF RIGHT KNEE: ICD-10-CM

## 2022-07-22 PROCEDURE — 20610 DRAIN/INJ JOINT/BURSA W/O US: CPT | Performed by: NURSE PRACTITIONER

## 2022-07-22 PROCEDURE — 73562 X-RAY EXAM OF KNEE 3: CPT | Performed by: NURSE PRACTITIONER

## 2022-07-22 PROCEDURE — 99214 OFFICE O/P EST MOD 30 MIN: CPT | Performed by: NURSE PRACTITIONER

## 2022-07-22 RX ORDER — LIDOCAINE HYDROCHLORIDE 10 MG/ML
8 INJECTION, SOLUTION EPIDURAL; INFILTRATION; INTRACAUDAL; PERINEURAL
Status: COMPLETED | OUTPATIENT
Start: 2022-07-22 | End: 2022-07-22

## 2022-07-22 RX ORDER — TRIAMCINOLONE ACETONIDE 40 MG/ML
80 INJECTION, SUSPENSION INTRA-ARTICULAR; INTRAMUSCULAR
Status: COMPLETED | OUTPATIENT
Start: 2022-07-22 | End: 2022-07-22

## 2022-07-22 RX ADMIN — TRIAMCINOLONE ACETONIDE 80 MG: 40 INJECTION, SUSPENSION INTRA-ARTICULAR; INTRAMUSCULAR at 08:42

## 2022-07-22 RX ADMIN — LIDOCAINE HYDROCHLORIDE 8 ML: 10 INJECTION, SOLUTION EPIDURAL; INFILTRATION; INTRACAUDAL; PERINEURAL at 08:42

## 2022-07-22 NOTE — PROGRESS NOTES
Subjective:     Patient ID: Danuta Batista is a 82 y.o. female.    Chief Complaint:  Follow-up DJD left knee   Right knee pain, new patient to examiner   History of Present Illness  Danuta Batista returns to clinic for evaluation of the left knee but also like to be seen for pain at the right knee.  Localizes pain to the medial aspect of the right knee.  Increased pain noted with ambulatory activities, ambulating long distances, standing for extended periods of time activities involving deep flexion.  She is experiencing pain radiating down to the medial aspect of the tib-fib and is noted some decreased strength of the lower extremity.  She is not noticing significant swelling but pain present.  Did receive corticosteroid injection 4/25/2022 left knee with fairly good symptom relief.  Denies any prior Visco injections.  Denies any prior corticosteroid injections or viscosupplementation injections right knee.  Denies other concerns present time.     Social History     Occupational History   • Not on file   Tobacco Use   • Smoking status: Never Smoker   • Smokeless tobacco: Never Used   • Tobacco comment: caff use   Vaping Use   • Vaping Use: Never used   Substance and Sexual Activity   • Alcohol use: No   • Drug use: No   • Sexual activity: Defer      Past Medical History:   Diagnosis Date   • Bladder infection, chronic    • Hyperlipidemia    • Hypertension    • Kidney stone      Past Surgical History:   Procedure Laterality Date   • CAROTID ENDARTERECTOMY      left side       Family History   Problem Relation Age of Onset   • Stroke Father    • Hypertension Sister    • Stroke Sister    • Heart disease Brother 77   • Hypertension Brother    • Hypertension Sister    • Cancer Sister    • Cancer Brother    • Breast cancer Neg Hx                Objective:  Physical Exam    General: No acute distress.  Eyes: conjunctiva clear; pupils equally round and reactive  ENT: external ears and nose atraumatic; oropharynx clear  CV: no  "peripheral edema  Resp: normal respiratory effort  Skin: no rashes or wounds; normal turgor  Psych: mood and affect appropriate; recent and remote memory intact    Vitals:    07/22/22 0819   BP: 176/76   BP Location: Right arm   Patient Position: Sitting   Cuff Size: Adult   Pulse: 73   Resp: 16   Weight: 72.6 kg (160 lb)   Height: 165.1 cm (65\")         07/22/22 0819   Weight: 72.6 kg (160 lb)     Body mass index is 26.63 kg/m².      Ortho Exam     Left Knee Exam      Tenderness   The patient is experiencing tenderness  anterior, medial joint line     Range of Motion   Extension: 5   Left knee flexion: 125.      Tests   Ayla:  Medial - positive Lateral - negative  Varus: negative Valgus: negative  Lachman:  Anterior - 1+    Posterior - negative  Drawer:  Anterior - negative     Posterior - negative  Patellar apprehension: positive     Other   Erythema: absent  Sensation: normal  Pulse: present  Swelling: moderate  Effusion: no effusion present     Comments:    Positive crepitus throughout arc of motion  Positive tenderness the medial and lateral patellar facet  Negative logroll exam  Negative Stinchfield exam    Right knee exam:  Maximal tenderness medial compartment  Range of motion 0 degrees extension 125 degrees flexion  Stable to varus valgus stress at 0 degrees and 30 degrees  1+ anterior Lachman negative anterior posterior drawer exam, positive for patellar apprehension, positive crepitus throughout arc of motion  Quad strength 4 out of 5  Positive sensation light touch all distributions of the right lower extremity  Mild swelling negative effusion  Negative logroll exam, negative Stinchfield exam    Imaging:    MRI Knee Left Without Contrast    Result Date: 6/21/2022  1. Complex tears of the posterior horn and body segment medial meniscus, detailed above. 2. Extensor mechanism, cruciate ligaments, collateral ligaments, and lateral meniscus appear intact. 3. Tricompartmental arthrosis, detailed above, " most severe in the medial compartment. 4. Small joint effusion with 2 loose bodies located along the far posterior nonweightbearing aspect of the medial femoral condyle, measuring 1.7 cm and 1.3 cm respectively. Signer Name: Breto Mason MD  Signed: 6/21/2022 2:12 PM  Workstation Name: [email or web address]  [facility]    Independently reviewed MRI left knee previously completed BH side complex tears of posterior and body segment medial meniscus.  Extensor mechanisms cruciate ligaments, collateral ligaments and lateral meniscus remain intact.  Tricompartmental arthrosis noted with most severe narrowing medial compartment.  Small joint effusion with 2 loose bodies located along the far posterior nonweightbearing aspect of the medial femoral condyle.    Right Knee X-Ray  Indication: Pain    AP, Lateral, and [place] views    Findings:  No fracture  No bony lesion  Normal soft tissues  Tricompartmental osteoarthritis with near bone-on-bone articulation medial compartment with reactive osteophytes medial compartment, moderate to advanced patellofemoral narrowing with osteophytes present patellofemoral joint    No prior studies were available for comparison.    Assessment:        1. Chronic pain of right knee    2. Primary osteoarthritis of knees, bilateral           Plan:  Large Joint Arthrocentesis  Date/Time: 7/22/2022 8:42 AM  Consent given by: patient  Site marked: site marked  Timeout: Immediately prior to procedure a time out was called to verify the correct patient, procedure, equipment, support staff and site/side marked as required   Supporting Documentation  Indications: pain   Procedure Details  Location: knee - Knee joint: bilateral knee.  Needle size: 22 G  Approach: superior (lateral)  Medications administered: 80 mg triamcinolone acetonide 40 MG/ML; 8 mL lidocaine PF 1% 1 %  Patient tolerance: patient tolerated the procedure well with no immediate complications          1. Discussed plan  of care with patient.  Discussed treatment options including viscosupplementation injections which she does wish to proceed with at this time.  Discussed with patient the Visco mechanism of action compared to that of the steroids.  Also discussed total knee arthroplasty which she does not wish to proceed with at this time.  Discussed continue weightbearing as tolerated, continue with active range of motion.  Do recommend application of ice at injection sites this evening.  Plan to see her back in clinic 6 weeks to reevaluate.  All questions answered.  Orders:  Orders Placed This Encounter   Procedures   • Large Joint Arthrocentesis   • XR Knee 3 View Right     No orders of the defined types were placed in this encounter.        Dragon Dictation utilized.

## 2022-07-29 ENCOUNTER — LAB (OUTPATIENT)
Dept: LAB | Facility: HOSPITAL | Age: 82
End: 2022-07-29

## 2022-07-29 ENCOUNTER — TRANSCRIBE ORDERS (OUTPATIENT)
Dept: ADMINISTRATIVE | Facility: HOSPITAL | Age: 82
End: 2022-07-29

## 2022-07-29 DIAGNOSIS — I10 ESSENTIAL (PRIMARY) HYPERTENSION: ICD-10-CM

## 2022-07-29 DIAGNOSIS — E11.9 DIABETES MELLITUS WITHOUT COMPLICATION: ICD-10-CM

## 2022-07-29 DIAGNOSIS — E78.2 MIXED HYPERLIPIDEMIA: ICD-10-CM

## 2022-07-29 DIAGNOSIS — D69.49 OTHER PRIMARY THROMBOCYTOPENIA: ICD-10-CM

## 2022-07-29 DIAGNOSIS — I10 ESSENTIAL (PRIMARY) HYPERTENSION: Primary | ICD-10-CM

## 2022-07-29 LAB
ALBUMIN SERPL-MCNC: 4.6 G/DL (ref 3.5–5.2)
ALBUMIN/GLOB SERPL: 1.5 G/DL
ALP SERPL-CCNC: 94 U/L (ref 39–117)
ALT SERPL W P-5'-P-CCNC: 27 U/L (ref 1–33)
ANION GAP SERPL CALCULATED.3IONS-SCNC: 12 MMOL/L (ref 5–15)
AST SERPL-CCNC: 16 U/L (ref 1–32)
BASOPHILS # BLD AUTO: 0.02 10*3/MM3 (ref 0–0.2)
BASOPHILS NFR BLD AUTO: 0.2 % (ref 0–1.5)
BILIRUB SERPL-MCNC: 0.8 MG/DL (ref 0–1.2)
BUN SERPL-MCNC: 19 MG/DL (ref 8–23)
BUN/CREAT SERPL: 22.9 (ref 7–25)
CALCIUM SPEC-SCNC: 10.2 MG/DL (ref 8.6–10.5)
CHLORIDE SERPL-SCNC: 104 MMOL/L (ref 98–107)
CHOLEST SERPL-MCNC: 160 MG/DL (ref 0–200)
CO2 SERPL-SCNC: 24 MMOL/L (ref 22–29)
CREAT SERPL-MCNC: 0.83 MG/DL (ref 0.57–1)
DEPRECATED RDW RBC AUTO: 45.3 FL (ref 37–54)
EGFRCR SERPLBLD CKD-EPI 2021: 70.5 ML/MIN/1.73
EOSINOPHIL # BLD AUTO: 0.06 10*3/MM3 (ref 0–0.4)
EOSINOPHIL NFR BLD AUTO: 0.7 % (ref 0.3–6.2)
ERYTHROCYTE [DISTWIDTH] IN BLOOD BY AUTOMATED COUNT: 13.8 % (ref 12.3–15.4)
GLOBULIN UR ELPH-MCNC: 3 GM/DL
GLUCOSE SERPL-MCNC: 133 MG/DL (ref 65–99)
HBA1C MFR BLD: 6.7 % (ref 4.8–5.6)
HCT VFR BLD AUTO: 47.2 % (ref 34–46.6)
HDLC SERPL-MCNC: 39 MG/DL (ref 40–60)
HGB BLD-MCNC: 15.9 G/DL (ref 12–15.9)
IMM GRANULOCYTES # BLD AUTO: 0.07 10*3/MM3 (ref 0–0.05)
IMM GRANULOCYTES NFR BLD AUTO: 0.8 % (ref 0–0.5)
LDLC SERPL CALC-MCNC: 90 MG/DL (ref 0–100)
LDLC/HDLC SERPL: 2.18 {RATIO}
LYMPHOCYTES # BLD AUTO: 1.24 10*3/MM3 (ref 0.7–3.1)
LYMPHOCYTES NFR BLD AUTO: 14.5 % (ref 19.6–45.3)
MCH RBC QN AUTO: 30.6 PG (ref 26.6–33)
MCHC RBC AUTO-ENTMCNC: 33.7 G/DL (ref 31.5–35.7)
MCV RBC AUTO: 90.8 FL (ref 79–97)
MONOCYTES # BLD AUTO: 0.56 10*3/MM3 (ref 0.1–0.9)
MONOCYTES NFR BLD AUTO: 6.5 % (ref 5–12)
NEUTROPHILS NFR BLD AUTO: 6.61 10*3/MM3 (ref 1.7–7)
NEUTROPHILS NFR BLD AUTO: 77.3 % (ref 42.7–76)
NRBC BLD AUTO-RTO: 0 /100 WBC (ref 0–0.2)
PLATELET # BLD AUTO: 176 10*3/MM3 (ref 140–450)
PMV BLD AUTO: 11.4 FL (ref 6–12)
POTASSIUM SERPL-SCNC: 4.8 MMOL/L (ref 3.5–5.2)
PROT SERPL-MCNC: 7.6 G/DL (ref 6–8.5)
RBC # BLD AUTO: 5.2 10*6/MM3 (ref 3.77–5.28)
SODIUM SERPL-SCNC: 140 MMOL/L (ref 136–145)
TRIGL SERPL-MCNC: 180 MG/DL (ref 0–150)
VLDLC SERPL-MCNC: 31 MG/DL (ref 5–40)
WBC NRBC COR # BLD: 8.56 10*3/MM3 (ref 3.4–10.8)

## 2022-07-29 PROCEDURE — 83036 HEMOGLOBIN GLYCOSYLATED A1C: CPT

## 2022-07-29 PROCEDURE — 85025 COMPLETE CBC W/AUTO DIFF WBC: CPT

## 2022-07-29 PROCEDURE — 36415 COLL VENOUS BLD VENIPUNCTURE: CPT

## 2022-07-29 PROCEDURE — 80053 COMPREHEN METABOLIC PANEL: CPT

## 2022-07-29 PROCEDURE — 80061 LIPID PANEL: CPT

## 2022-09-02 ENCOUNTER — OFFICE VISIT (OUTPATIENT)
Dept: ORTHOPEDIC SURGERY | Facility: CLINIC | Age: 82
End: 2022-09-02

## 2022-09-02 VITALS — BODY MASS INDEX: 26.66 KG/M2 | HEIGHT: 65 IN | WEIGHT: 160 LBS

## 2022-09-02 DIAGNOSIS — M17.0 PRIMARY OSTEOARTHRITIS OF KNEES, BILATERAL: Primary | ICD-10-CM

## 2022-09-02 PROCEDURE — 20610 DRAIN/INJ JOINT/BURSA W/O US: CPT | Performed by: NURSE PRACTITIONER

## 2022-09-02 NOTE — PROGRESS NOTES
"Subjective:     Patient ID: Danuta Batista is a 82 y.o. female.    Chief Complaint:  Follow-up DJD bilateral knees  History of Present Illness  Danuta Batista returns to clinic for follow-up bilateral knees does continue to experience some discomfort with transitional activities denies that the knees are locking, catching or giving way.  Denies any other concerns present.     Social History     Occupational History   • Not on file   Tobacco Use   • Smoking status: Never Smoker   • Smokeless tobacco: Never Used   • Tobacco comment: caff use   Vaping Use   • Vaping Use: Never used   Substance and Sexual Activity   • Alcohol use: No   • Drug use: No   • Sexual activity: Defer      Past Medical History:   Diagnosis Date   • Bladder infection, chronic    • Hyperlipidemia    • Hypertension    • Kidney stone      Past Surgical History:   Procedure Laterality Date   • CAROTID ENDARTERECTOMY      left side       Family History   Problem Relation Age of Onset   • Stroke Father    • Hypertension Sister    • Stroke Sister    • Heart disease Brother 77   • Hypertension Brother    • Hypertension Sister    • Cancer Sister    • Cancer Brother    • Breast cancer Neg Hx                Objective:  Physical Exam    General: No acute distress.  Eyes: conjunctiva clear; pupils equally round and reactive  ENT: external ears and nose atraumatic; oropharynx clear  CV: no peripheral edema  Resp: normal respiratory effort  Skin: no rashes or wounds; normal turgor  Psych: mood and affect appropriate; recent and remote memory intact    Vitals:    09/02/22 0809   Weight: 72.6 kg (160 lb)   Height: 165.1 cm (65\")         09/02/22  0809   Weight: 72.6 kg (160 lb)     Body mass index is 26.63 kg/m².      Ortho Exam       No exam completed today's visit  Assessment:        1. Primary osteoarthritis of knees, bilateral           Plan:    Large Joint Arthrocentesis  Date/Time: 9/2/2022 8:21 AM  Consent given by: patient  Site marked: site marked  Timeout: " Immediately prior to procedure a time out was called to verify the correct patient, procedure, equipment, support staff and site/side marked as required   Supporting Documentation  Indications: pain   Procedure Details  Location: knee - Knee joint: BILATERAL KNEE.  Preparation: Patient was prepped and draped in the usual sterile fashion  Needle size: 20 G  Approach: anterolateral  Medications administered: 88 mg Hyaluronan 88 MG/4ML  Patient tolerance: patient tolerated the procedure well with no immediate complications          1. Discussed plan of care with patient.  Discussed treatment options including viscosupplementation injections she does wish to proceed with today's visit.  Discussed application of ice at injection site avoiding any new activity for the next 48 hours.  Plan to see her back in clinic in 6 weeks to reevaluate.  All questions answered.  Orders:  Orders Placed This Encounter   Procedures   • Large Joint Arthrocentesis     No orders of the defined types were placed in this encounter.          Dragon Dictation utilized.

## 2022-10-14 ENCOUNTER — OFFICE VISIT (OUTPATIENT)
Dept: ORTHOPEDIC SURGERY | Facility: CLINIC | Age: 82
End: 2022-10-14

## 2022-10-14 VITALS — WEIGHT: 160 LBS | HEIGHT: 65 IN | BODY MASS INDEX: 26.66 KG/M2

## 2022-10-14 DIAGNOSIS — M17.0 PRIMARY OSTEOARTHRITIS OF KNEES, BILATERAL: Primary | ICD-10-CM

## 2022-10-14 PROCEDURE — 99213 OFFICE O/P EST LOW 20 MIN: CPT | Performed by: NURSE PRACTITIONER

## 2022-12-19 ENCOUNTER — OFFICE VISIT (OUTPATIENT)
Dept: ORTHOPEDIC SURGERY | Facility: CLINIC | Age: 82
End: 2022-12-19

## 2022-12-19 VITALS — WEIGHT: 160 LBS | BODY MASS INDEX: 26.66 KG/M2 | HEIGHT: 65 IN

## 2022-12-19 DIAGNOSIS — M17.0 PRIMARY OSTEOARTHRITIS OF KNEES, BILATERAL: Primary | ICD-10-CM

## 2022-12-19 PROCEDURE — 20610 DRAIN/INJ JOINT/BURSA W/O US: CPT | Performed by: NURSE PRACTITIONER

## 2022-12-19 RX ORDER — TRIAMCINOLONE ACETONIDE 40 MG/ML
80 INJECTION, SUSPENSION INTRA-ARTICULAR; INTRAMUSCULAR
Status: COMPLETED | OUTPATIENT
Start: 2022-12-19 | End: 2022-12-19

## 2022-12-19 RX ORDER — CLOPIDOGREL BISULFATE 75 MG/1
1 TABLET ORAL DAILY
COMMUNITY
Start: 2022-11-02

## 2022-12-19 RX ORDER — LIDOCAINE HYDROCHLORIDE 10 MG/ML
8 INJECTION, SOLUTION EPIDURAL; INFILTRATION; INTRACAUDAL; PERINEURAL
Status: COMPLETED | OUTPATIENT
Start: 2022-12-19 | End: 2022-12-19

## 2022-12-19 RX ADMIN — TRIAMCINOLONE ACETONIDE 80 MG: 40 INJECTION, SUSPENSION INTRA-ARTICULAR; INTRAMUSCULAR at 11:19

## 2022-12-19 RX ADMIN — LIDOCAINE HYDROCHLORIDE 8 ML: 10 INJECTION, SOLUTION EPIDURAL; INFILTRATION; INTRACAUDAL; PERINEURAL at 11:19

## 2022-12-19 NOTE — PROGRESS NOTES
Subjective:     Patient ID: Danuta Batista is a 82 y.o. female.    Chief Complaint:  DJD bilateral knees  Viscosupplementation injections bilaterally 9/2/2022  Steroid injection 7/22/2022  History of Present Illness  Danuta Batista returns to clinic for follow-up bilateral knees.  Receive viscosupplementation injection 9/2/2022 we did follow-up 6 weeks post was doing well however over the last 1 to 2 weeks is noted worsening of pain and swelling bilateral knees.  Symptoms have been progressively getting worse by noon she has to sit down she is unable to complete any further activity due to fatigue as well as pain and swelling bilaterally.  Pain is not rating to the groin she does localize pain to the medial compartment and anterior aspect of bilateral knees.  Increased pain stiffness and tightness with flexion and again transitional activities are very painful.  She did have to ambulate from the parking lot no available Did note increased pain with long distance walking.  She does continue with pain tolerating generally fairly well but worse over the last few weeks.  Denies any other concerns present.    Social History     Occupational History   • Not on file   Tobacco Use   • Smoking status: Never   • Smokeless tobacco: Never   • Tobacco comments:     caff use   Vaping Use   • Vaping Use: Never used   Substance and Sexual Activity   • Alcohol use: No   • Drug use: No   • Sexual activity: Defer      Past Medical History:   Diagnosis Date   • Bladder infection, chronic    • Hyperlipidemia    • Hypertension    • Kidney stone      Past Surgical History:   Procedure Laterality Date   • CAROTID ENDARTERECTOMY      left side       Family History   Problem Relation Age of Onset   • Stroke Father    • Hypertension Sister    • Stroke Sister    • Heart disease Brother 77   • Hypertension Brother    • Hypertension Sister    • Cancer Sister    • Cancer Brother    • Breast cancer Neg Hx                Objective:  Physical  "Exam    General: No acute distress.  Eyes: conjunctiva clear; pupils equally round and reactive  ENT: external ears and nose atraumatic; oropharynx clear  CV: no peripheral edema  Resp: normal respiratory effort  Skin: no rashes or wounds; normal turgor  Psych: mood and affect appropriate; recent and remote memory intact    Vitals:    12/19/22 1048   Weight: 72.6 kg (160 lb)   Height: 165.1 cm (65\")         12/19/22  1048   Weight: 72.6 kg (160 lb)     Body mass index is 26.63 kg/m².      Right Knee Exam     Tenderness   The patient is experiencing tenderness in the medial joint line, patella and lateral joint line.    Range of Motion   Extension: 0   Flexion: 110     Tests   Lachman:  Anterior - 1+      Drawer:  Anterior - negative    Posterior - negative  Patellar apprehension: positive    Other   Erythema: absent  Sensation: normal  Pulse: present  Swelling: moderate  Effusion: effusion present      Left Knee Exam     Tenderness   The patient is experiencing tenderness in the medial joint line, lateral joint line and patella.    Range of Motion   Extension: 0   Flexion: 110     Tests   Lachman:  Anterior - 1+      Drawer:  Anterior - negative     Posterior - negative  Patellar apprehension: positive    Other   Erythema: absent  Sensation: normal  Pulse: present  Swelling: moderate  Effusion: effusion present           Assessment:        1. Primary osteoarthritis of knees, bilateral           Plan:  1. Discussed plan of care with patient.  Discussed repeating corticosteroid injections which she does wish to proceed with at today's visit.  Discussed application of ice at injection site this evening may take 3 to 7 days before she experiences any significant symptom relief continue weightbearing as tolerated.  She does have prior follow-up in March previously scheduled we will keep appointment gladly see her back sooner if needed.  All questions answered.  Large Joint Arthrocentesis  Date/Time: 12/19/2022 11:19 " AM  Consent given by: patient  Site marked: site marked  Timeout: Immediately prior to procedure a time out was called to verify the correct patient, procedure, equipment, support staff and site/side marked as required   Supporting Documentation  Indications: pain   Procedure Details  Location: knee - Knee joint: bilateral knees.  Needle size: 22 G  Approach: anterolateral  Medications administered: 8 mL lidocaine PF 1% 1 %; 80 mg triamcinolone acetonide 40 MG/ML          Orders:  Orders Placed This Encounter   Procedures   • Large Joint Arthrocentesis     No orders of the defined types were placed in this encounter.

## 2023-01-04 ENCOUNTER — HOSPITAL ENCOUNTER (OUTPATIENT)
Dept: ULTRASOUND IMAGING | Facility: HOSPITAL | Age: 83
Discharge: HOME | End: 2023-01-04
Payer: MEDICARE

## 2023-01-04 ENCOUNTER — LAB (OUTPATIENT)
Dept: LAB | Facility: HOSPITAL | Age: 83
End: 2023-01-04
Payer: MEDICARE

## 2023-01-04 ENCOUNTER — TRANSCRIBE ORDERS (OUTPATIENT)
Dept: ADMINISTRATIVE | Facility: HOSPITAL | Age: 83
End: 2023-01-04
Payer: MEDICARE

## 2023-01-04 DIAGNOSIS — N20.0 CALCULUS, RENAL: ICD-10-CM

## 2023-01-04 DIAGNOSIS — N20.0 URIC ACID NEPHROLITHIASIS: ICD-10-CM

## 2023-01-04 DIAGNOSIS — N20.0 URIC ACID NEPHROLITHIASIS: Primary | ICD-10-CM

## 2023-01-04 LAB
ALBUMIN SERPL-MCNC: 4.7 G/DL (ref 3.5–5.2)
ALBUMIN/GLOB SERPL: 1.9 G/DL
ALP SERPL-CCNC: 91 U/L (ref 39–117)
ALT SERPL W P-5'-P-CCNC: 60 U/L (ref 1–33)
ANION GAP SERPL CALCULATED.3IONS-SCNC: 9 MMOL/L (ref 5–15)
AST SERPL-CCNC: 23 U/L (ref 1–32)
BILIRUB SERPL-MCNC: 0.8 MG/DL (ref 0–1.2)
BUN SERPL-MCNC: 18 MG/DL (ref 8–23)
BUN/CREAT SERPL: 18.9 (ref 7–25)
CALCIUM SPEC-SCNC: 10.4 MG/DL (ref 8.6–10.5)
CHLORIDE SERPL-SCNC: 103 MMOL/L (ref 98–107)
CO2 SERPL-SCNC: 28 MMOL/L (ref 22–29)
CREAT SERPL-MCNC: 0.95 MG/DL (ref 0.57–1)
EGFRCR SERPLBLD CKD-EPI 2021: 59.9 ML/MIN/1.73
GLOBULIN UR ELPH-MCNC: 2.5 GM/DL
GLUCOSE SERPL-MCNC: 130 MG/DL (ref 65–99)
POTASSIUM SERPL-SCNC: 4.5 MMOL/L (ref 3.5–5.2)
PROT SERPL-MCNC: 7.2 G/DL (ref 6–8.5)
SODIUM SERPL-SCNC: 140 MMOL/L (ref 136–145)

## 2023-01-04 PROCEDURE — 80053 COMPREHEN METABOLIC PANEL: CPT

## 2023-01-04 PROCEDURE — 36415 COLL VENOUS BLD VENIPUNCTURE: CPT

## 2023-01-04 PROCEDURE — 76775 US EXAM ABDO BACK WALL LIM: CPT

## 2023-02-06 ENCOUNTER — TRANSCRIBE ORDERS (OUTPATIENT)
Dept: ADMINISTRATIVE | Facility: HOSPITAL | Age: 83
End: 2023-02-06
Payer: MEDICARE

## 2023-02-06 DIAGNOSIS — N20.0 RENAL CALCULUS: Primary | ICD-10-CM

## 2023-02-17 ENCOUNTER — TRANSCRIBE ORDERS (OUTPATIENT)
Dept: ADMINISTRATIVE | Facility: HOSPITAL | Age: 83
End: 2023-02-17
Payer: MEDICARE

## 2023-02-17 DIAGNOSIS — N20.0 RENAL CALCULUS: Primary | ICD-10-CM

## 2023-02-21 ENCOUNTER — TRANSCRIBE ORDERS (OUTPATIENT)
Dept: ADMINISTRATIVE | Facility: HOSPITAL | Age: 83
End: 2023-02-21
Payer: MEDICARE

## 2023-02-21 ENCOUNTER — LAB (OUTPATIENT)
Dept: LAB | Facility: HOSPITAL | Age: 83
End: 2023-02-21
Payer: MEDICARE

## 2023-02-21 DIAGNOSIS — E11.9 DIABETES MELLITUS WITHOUT COMPLICATION: ICD-10-CM

## 2023-02-21 DIAGNOSIS — D69.49 OTHER PRIMARY THROMBOCYTOPENIA: ICD-10-CM

## 2023-02-21 DIAGNOSIS — I10 HYPERTENSION, ESSENTIAL: Primary | ICD-10-CM

## 2023-02-21 DIAGNOSIS — E78.2 MIXED HYPERLIPIDEMIA: ICD-10-CM

## 2023-02-21 DIAGNOSIS — I10 HYPERTENSION, ESSENTIAL: ICD-10-CM

## 2023-02-21 LAB
ALBUMIN SERPL-MCNC: 4.7 G/DL (ref 3.5–5.2)
ALBUMIN/GLOB SERPL: 1.7 G/DL
ALP SERPL-CCNC: 86 U/L (ref 39–117)
ALT SERPL W P-5'-P-CCNC: 26 U/L (ref 1–33)
ANION GAP SERPL CALCULATED.3IONS-SCNC: 8.1 MMOL/L (ref 5–15)
AST SERPL-CCNC: 22 U/L (ref 1–32)
BASOPHILS # BLD AUTO: 0.05 10*3/MM3 (ref 0–0.2)
BASOPHILS NFR BLD AUTO: 0.9 % (ref 0–1.5)
BILIRUB SERPL-MCNC: 0.9 MG/DL (ref 0–1.2)
BUN SERPL-MCNC: 16 MG/DL (ref 8–23)
BUN/CREAT SERPL: 17.8 (ref 7–25)
CALCIUM SPEC-SCNC: 10.3 MG/DL (ref 8.6–10.5)
CHLORIDE SERPL-SCNC: 100 MMOL/L (ref 98–107)
CHOLEST SERPL-MCNC: 149 MG/DL (ref 0–200)
CO2 SERPL-SCNC: 27.9 MMOL/L (ref 22–29)
CREAT SERPL-MCNC: 0.9 MG/DL (ref 0.57–1)
DEPRECATED RDW RBC AUTO: 48 FL (ref 37–54)
EGFRCR SERPLBLD CKD-EPI 2021: 64 ML/MIN/1.73
EOSINOPHIL # BLD AUTO: 0.07 10*3/MM3 (ref 0–0.4)
EOSINOPHIL NFR BLD AUTO: 1.2 % (ref 0.3–6.2)
ERYTHROCYTE [DISTWIDTH] IN BLOOD BY AUTOMATED COUNT: 14.1 % (ref 12.3–15.4)
GLOBULIN UR ELPH-MCNC: 2.8 GM/DL
GLUCOSE SERPL-MCNC: 124 MG/DL (ref 65–99)
HBA1C MFR BLD: 7.3 % (ref 4.8–5.6)
HCT VFR BLD AUTO: 47.4 % (ref 34–46.6)
HDLC SERPL-MCNC: 40 MG/DL (ref 40–60)
HGB BLD-MCNC: 15.3 G/DL (ref 12–15.9)
IMM GRANULOCYTES # BLD AUTO: 0.03 10*3/MM3 (ref 0–0.05)
IMM GRANULOCYTES NFR BLD AUTO: 0.5 % (ref 0–0.5)
LDLC SERPL CALC-MCNC: 72 MG/DL (ref 0–100)
LDLC/HDLC SERPL: 1.59 {RATIO}
LYMPHOCYTES # BLD AUTO: 1.12 10*3/MM3 (ref 0.7–3.1)
LYMPHOCYTES NFR BLD AUTO: 19.5 % (ref 19.6–45.3)
MCH RBC QN AUTO: 29.9 PG (ref 26.6–33)
MCHC RBC AUTO-ENTMCNC: 32.3 G/DL (ref 31.5–35.7)
MCV RBC AUTO: 92.6 FL (ref 79–97)
MONOCYTES # BLD AUTO: 0.42 10*3/MM3 (ref 0.1–0.9)
MONOCYTES NFR BLD AUTO: 7.3 % (ref 5–12)
NEUTROPHILS NFR BLD AUTO: 4.06 10*3/MM3 (ref 1.7–7)
NEUTROPHILS NFR BLD AUTO: 70.6 % (ref 42.7–76)
NRBC BLD AUTO-RTO: 0 /100 WBC (ref 0–0.2)
PLATELET # BLD AUTO: 157 10*3/MM3 (ref 140–450)
PMV BLD AUTO: 11 FL (ref 6–12)
POTASSIUM SERPL-SCNC: 4.5 MMOL/L (ref 3.5–5.2)
PROT SERPL-MCNC: 7.5 G/DL (ref 6–8.5)
RBC # BLD AUTO: 5.12 10*6/MM3 (ref 3.77–5.28)
SODIUM SERPL-SCNC: 136 MMOL/L (ref 136–145)
TRIGL SERPL-MCNC: 228 MG/DL (ref 0–150)
VLDLC SERPL-MCNC: 37 MG/DL (ref 5–40)
WBC NRBC COR # BLD: 5.75 10*3/MM3 (ref 3.4–10.8)

## 2023-02-21 PROCEDURE — 83036 HEMOGLOBIN GLYCOSYLATED A1C: CPT

## 2023-02-21 PROCEDURE — 80053 COMPREHEN METABOLIC PANEL: CPT

## 2023-02-21 PROCEDURE — 85025 COMPLETE CBC W/AUTO DIFF WBC: CPT

## 2023-02-21 PROCEDURE — 36415 COLL VENOUS BLD VENIPUNCTURE: CPT

## 2023-02-21 PROCEDURE — 80061 LIPID PANEL: CPT

## 2023-05-12 ENCOUNTER — OFFICE VISIT (OUTPATIENT)
Dept: ORTHOPEDIC SURGERY | Facility: CLINIC | Age: 83
End: 2023-05-12
Payer: MEDICARE

## 2023-05-12 VITALS — BODY MASS INDEX: 27.02 KG/M2 | WEIGHT: 162.2 LBS | HEIGHT: 65 IN

## 2023-05-12 DIAGNOSIS — S46.811A STRAIN OF RIGHT TRAPEZIUS MUSCLE, INITIAL ENCOUNTER: ICD-10-CM

## 2023-05-12 DIAGNOSIS — M50.30 DDD (DEGENERATIVE DISC DISEASE), CERVICAL: ICD-10-CM

## 2023-05-12 DIAGNOSIS — M54.2 CERVICAL PAIN (NECK): ICD-10-CM

## 2023-05-12 DIAGNOSIS — M17.0 PRIMARY OSTEOARTHRITIS OF KNEES, BILATERAL: Primary | ICD-10-CM

## 2023-05-12 RX ORDER — LIDOCAINE HYDROCHLORIDE 10 MG/ML
8 INJECTION, SOLUTION EPIDURAL; INFILTRATION; INTRACAUDAL; PERINEURAL
Status: COMPLETED | OUTPATIENT
Start: 2023-05-12 | End: 2023-05-12

## 2023-05-12 RX ORDER — TRIAMCINOLONE ACETONIDE 40 MG/ML
80 INJECTION, SUSPENSION INTRA-ARTICULAR; INTRAMUSCULAR
Status: COMPLETED | OUTPATIENT
Start: 2023-05-12 | End: 2023-05-12

## 2023-05-12 RX ADMIN — LIDOCAINE HYDROCHLORIDE 8 ML: 10 INJECTION, SOLUTION EPIDURAL; INFILTRATION; INTRACAUDAL; PERINEURAL at 08:23

## 2023-05-12 RX ADMIN — TRIAMCINOLONE ACETONIDE 80 MG: 40 INJECTION, SUSPENSION INTRA-ARTICULAR; INTRAMUSCULAR at 08:23

## 2023-05-12 NOTE — PROGRESS NOTES
Subjective:     Patient ID: Danuta Batista is a 82 y.o. female.    Chief Complaint:  DJD bilateral knees  Viscosupplementation injections bilaterally 9/2/2022  Corticosteroid injection bilateral knees 12/19/2022  Neck pain, new issue to examiner   History of Present Illness  Danuta Batista returns to clinic for follow-up bilateral knees would also like to be seen for pain she is experiencing in the right side of her neck.  She is very pleased with symptom relief that she received after her injections bilateral knees in December has provided her with significant symptom improvement over last few months into the last 1 to 2 weeks.  Pain present along the medial and lateral joint line as well as the anterior aspect.  Increased pain noted with transitional activity such from seated standing attempting to walk, stiffness with flexion and decreased activity tolerance.  She also like to be seen for pain she is experiencing at her neck.  Increased pain noted with rotational to the right and left and worsening of pain with flexion.  Initially began experiencing pain approximately 3 years ago at the right side with pain that radiates up into the right side of the neck but has progressed over the last few months.  She has tried repositioning stretches without any significant symptom improvement.  Denies knowledge of paresthesia down right or left upper extremity but does note fatigue with activity.  She is unable to turn her head in order to safely see when she is driving.  Denies any prior x-ray, MRI, CT.  Denies any prior cervical epidural injections.  Pain present over the last few months but is gotten progressively worse right side neck pain greater than the left.  Denies any other concerns present.       Social History     Occupational History   • Not on file   Tobacco Use   • Smoking status: Never   • Smokeless tobacco: Never   • Tobacco comments:     caff use   Vaping Use   • Vaping Use: Never used   Substance and Sexual  "Activity   • Alcohol use: No   • Drug use: No   • Sexual activity: Defer      Past Medical History:   Diagnosis Date   • Bladder infection, chronic    • Hyperlipidemia    • Hypertension    • Kidney stone      Past Surgical History:   Procedure Laterality Date   • CAROTID ENDARTERECTOMY      left side       Family History   Problem Relation Age of Onset   • Stroke Father    • Hypertension Sister    • Stroke Sister    • Heart disease Brother 77   • Hypertension Brother    • Hypertension Sister    • Cancer Sister    • Cancer Brother    • Breast cancer Neg Hx                Objective:  Physical Exam    General: No acute distress.  Eyes: conjunctiva clear; pupils equally round and reactive  ENT: external ears and nose atraumatic; oropharynx clear  CV: no peripheral edema  Resp: normal respiratory effort  Skin: no rashes or wounds; normal turgor  Psych: mood and affect appropriate; recent and remote memory intact    Vitals:    05/12/23 0810   Weight: 73.6 kg (162 lb 3.2 oz)   Height: 165.1 cm (65\")         05/12/23  0810   Weight: 73.6 kg (162 lb 3.2 oz)     Body mass index is 26.99 kg/m².      Back Exam     Tenderness   The patient is experiencing tenderness in the cervical.    Range of Motion   Extension: 60   Lateral bend right: abnormal   Lateral bend left: abnormal   Rotation right: abnormal   Rotation left: abnormal     Comments:  Positive tenderness right side of neck  Negative Spurling's exam           Bilateral knees examined  Knee range of motion 0 passive to 110 degrees  Mild to moderate swelling bilaterally  Positive tenderness along the medial joint line, lateral joint line patella  Stable to varus and valgus stress at 0 degrees and 30 degrees    Imaging:  Cervical Spine X-Ray    Indication: Pain  Views: AP and Lateral    Findings:  No fracture  No bony lesions  Soft tissues normal  Multilevel degeneration greatest narrowing C5-C6 C6-C7, facet arthropathy appreciated    No prior studies are available for " comparison.    Assessment:        1. Primary osteoarthritis of knees, bilateral    2. Cervical pain (neck)    3. DDD (degenerative disc disease), cervical    4. Strain of right trapezius muscle, initial encounter           Plan:    Large Joint Arthrocentesis  Date/Time: 5/12/2023 8:23 AM  Consent given by: patient  Site marked: site marked  Timeout: Immediately prior to procedure a time out was called to verify the correct patient, procedure, equipment, support staff and site/side marked as required   Supporting Documentation  Indications: pain   Procedure Details  Location: knee - Knee joint: bilateral.  Preparation: Patient was prepped and draped in the usual sterile fashion  Needle size: 22 G  Approach: superior lateral.  Medications administered: 80 mg triamcinolone acetonide 40 MG/ML; 8 mL lidocaine PF 1% 1 %  Patient tolerance: patient tolerated the procedure well with no immediate complications          1. Discussed plan of care with patient.  Discussed options including starting with physical therapy to see if this will offer any symptom relief cervical spine.  Discussed plan to see her back in clinic in 4 weeks if no improvement we will proceed with MRI cervical spine.  She does wish to proceed with corticosteroid injections bilateral knees today's visit.  Encouraged to call between now and follow-up with worsening of symptoms gladly see her sooner.  All questions answered.  Orders:  Orders Placed This Encounter   Procedures   • Large Joint Arthrocentesis   • XR Spine Cervical 2 or 3 View   • Ambulatory Referral to Physical Therapy     No orders of the defined types were placed in this encounter.          I ordered and reviewed the MARY KAY today.       Dragon dictation utilized

## 2023-06-01 ENCOUNTER — HOSPITAL ENCOUNTER (OUTPATIENT)
Dept: PHYSICAL THERAPY | Facility: HOSPITAL | Age: 83
Discharge: HOME | End: 2023-06-01
Admitting: NURSE PRACTITIONER

## 2023-06-01 DIAGNOSIS — S46.811A STRAIN OF RIGHT TRAPEZIUS MUSCLE, INITIAL ENCOUNTER: ICD-10-CM

## 2023-06-01 DIAGNOSIS — M54.2 CERVICAL PAIN (NECK): Primary | ICD-10-CM

## 2023-06-01 DIAGNOSIS — M50.30 DDD (DEGENERATIVE DISC DISEASE), CERVICAL: ICD-10-CM

## 2023-06-01 PROCEDURE — 97161 PT EVAL LOW COMPLEX 20 MIN: CPT | Performed by: PHYSICAL THERAPIST

## 2023-06-01 NOTE — THERAPY EVALUATION
Outpatient Physical Therapy Ortho Initial Evaluation  [facility]     Patient Name: Danuta Batista  : 1940  MRN: [id number]  Today's Date: 2023      Visit Date: 2023    Patient Active Problem List   Diagnosis   • Abnormal echocardiogram   • Primary osteoarthritis of left knee   • Chest discomfort   • H/O bladder infections   • Bladder infection, chronic   • Primary osteoarthritis of knees, bilateral   • Cervical pain (neck)        Past Medical History:   Diagnosis Date   • Bladder infection, chronic    • Hyperlipidemia    • Hypertension    • Kidney stone         Past Surgical History:   Procedure Laterality Date   • CAROTID ENDARTERECTOMY      left side       Visit Dx:     ICD-10-CM ICD-9-CM   1. Cervical pain (neck)  M54.2 723.1   2. DDD (degenerative disc disease), cervical  M50.30 722.4   3. Strain of right trapezius muscle, initial encounter  S46.811A 840.8          Patient History     Row Name 23 0900             History    Chief Complaint Pain;Joint stiffness  -SP      Type of Pain Neck pain  -SP      Date Current Problem(s) Began --    -SP      Brief Description of Current Complaint Patient reports that she is having pain in right side of neck and this started in  after her first Covid vaccine.   Patient states she did have Xray of neck/shoulder but she does not know results.  Patient reports she did have a carotid stent on left side in 2018.  Patient denies headache, but does have pain into right shoulder.  She states she has had issues in right shoulder since Covid injection.  Patient reports that she tries to avoid bending her head forward.  She denies numbness or tingling  -SP      Patient/Caregiver Goals Relieve pain;Know what to do to help the symptoms  -SP      Patient's Rating of General Health Good  -SP      Hand Dominance right-handed  -SP      Occupation/sports/leisure activities lives alone  -SP      How has patient tried to help current problem? retired; has tried  heat without relief  -SP      What clinical tests have you had for this problem? X-ray  -SP      Results of Clinical Tests patient does not know results  -SP         Pain     Pain Location Neck;Shoulder  -SP      Pain at Present 3  -SP      Pain at Worst 8  -SP      Pain Frequency Intermittent  -SP      Pain Description Aching;Tightness;Shooting  -SP      What Performance Factors Make the Current Problem(s) WORSE? bending or looking foreward and especially returning to upright.  -SP      What Performance Factors Make the Current Problem(s) BETTER? nothing  -SP      Tolerance Time- Standing limited by knee pain  -SP      Tolerance Time- Sitting limited  -SP      Tolerance Time- Walking limited by knee pain  -SP      Is your sleep disturbed? Yes  not due to cervical spine  -SP      Difficulties at work? retired  -SP      Difficulties with ADL's? increased time required; difficulty looking down and rotating cervical spine  -SP         Fall Risk Assessment    Any falls in the past year: No  -SP         Daily Activities    Primary Language English  -SP      Are you able to read Yes  -SP      Are you able to write Yes  -SP      How does patient learn best? Listening;Reading;Demonstration;Pictures/Video  -SP      Teaching needs identified Home Exercise Program;Management of Condition  -SP      Does patient have problems with the following? None  -SP      Barriers to learning None  -SP      Pt Participated in POC and Goals Yes  -SP         Safety    Are you being hurt, hit, or frightened by anyone at home or in your life? No  -SP      Are you being neglected by a caregiver No  -SP            User Key  (r) = Recorded By, (t) = Taken By, (c) = Cosigned By    Initials Name Provider Type    SP Joanna Robertson, PT Physical Therapist                 PT Ortho     Row Name 06/01/23 0900       Posture/Observations    Forward Head Moderate  -SP    Cervical Lordosis Decreased  -SP    Thoracic Kyphosis Moderate  -SP    Rounded  Shoulders Bilateral:;Moderate  -SP    Posture/Observations Comments Patient ambulates with SPC  -SP       DTR- Upper Quarter Clearing    Biceps (C5/6) Bilateral:;1- Minimal response  -SP    Brachioradialis (C6) Bilateral:;1- Minimal response  -SP    Triceps (C7) Bilateral:;1- Minimal response  -SP       Myotomal Screen- Upper Quarter Clearing    Shoulder flexion (C5) Bilateral:;4 (Good)  -SP    Elbow flexion/wrist extension (C6) Bilateral:;4 (Good)  -SP    Elbow extension/wrist flexion (C7) Bilateral:;4 (Good)  -SP    Finger flexion/ (C8) Bilateral:;4- (Good -)  -SP    Finger abduction (T1) Bilateral:;4 (Good)  -SP       Cervical Palpation    Suboccipital Bilateral:;Tender;Guarded/taut  -SP    Cervical Facets Tender;Bilateral:  -SP    Levator Scapula Bilateral:;Tender;Guarded/taut  -SP    Upper Traps Bilateral:;Tender;Guarded/taut  -SP       Cervical/Thoracic Special Tests    1st Rib Mobility (TOS) Bilateral:;Negative  -SP       General ROM    GENERAL ROM COMMENTS left UE AROM WFL  -SP       Head/Neck/Trunk    Neck Extension AROM 25 degrees  -SP    Neck Flexion AROM 40 degrees with pain  -SP    Neck Lt Lateral Flexion AROM 24 degrees with pain  -SP    Neck Rt Lateral Flexion AROM 25 degrees with pain  -SP    Neck Lt Rotation AROM 45 degrees with pain  -SP    Neck Rt Rotation AROM 55 degrees with pain  -SP       Right Upper Ext    Rt Shoulder Abduction AROM 104 degrees  -SP    Rt Shoulder Flexion AROM 127 degrees  -SP    Rt Shoulder External Rotation AROM able to reach to back of head without difficulty  -SP    Rt Shoulder Internal Rotation AROM able to reach to posterior iliac crest right side  -SP       MMT Right Upper Ext    Rt Scapular ADduction MMT, Gross Movement (2/5) poor  -SP    Rt Scapular ADduction & Depression MMT, Gross Movement (2/5) poor  -SP       MMT Left Upper Ext    Lt Scapular ADduction MMT, Gross Movement (2/5) poor  -SP    Lt Scapular ADduction & Depression MMT, Gross Movement (2/5) poor   -SP       Sensation    Sensation WNL? WFL  -SP       Upper Extremity Flexibility    Suboccipitals Bilateral:;Moderately limited  -SP    Scalenes Bilateral:;Moderately limited  -SP    Upper Trapezius Bilateral:;Moderately limited  -SP    Levator Scapula Bilateral:;Moderately limited  -SP    Pect Minor Bilateral:;Moderately limited  -SP       Transfers    Bed-Chair [place] (Transfers) independent  -SP    Chair-Bed [place] (Transfers) independent  -SP    Sit-Stand [place] (Transfers) independent  -SP    Stand-Sit [place] (Transfers) independent  -SP    Comment, (Transfers) Patient uses bilateral UEs to assist with transfers sit to stand  -SP       Gait/Stairs (Locomotion)    [place] Level (Gait) independent  -SP    Assistive Device (Gait) cane, quad tip  -SP          User Key  (r) = Recorded By, (t) = Taken By, (c) = Cosigned By    Initials Name Provider Type    Joanna Chambers, PT Physical Therapist                            Therapy Education  Given: HEP  Program: New  How Provided: Verbal, Written  Provided to: Patient  Level of Understanding: Verbalized, Demonstrated      PT OP Goals     Row Name 06/01/23 0900          PT Short Term Goals    STG Date to Achieve 06/16/23  -SP     STG 1 Patient demonstrates increased cervical AROM rotation to >55 degrees bilaterally without complaints of pain at end ranges  -SP     STG 2 Patient reports that she is able to complete light household ADLs with pain level <3/10 at worst  -SP     STG 3 Patient reports improved tolerance for looking forward to read  -SP        Long Term Goals    LTG Date to Achieve 07/01/23  -SP     LTG 1 Patient demonstrates cervical AROM rotation to 60 degrees to allow safe mobility  -SP     LTG 2 Patient demonstrates increased strength of postural muscles by one muscle grade  -SP     LTG 3 Patient independent with HEP  -SP        Time Calculation    PT Goal Re-Cert Due Date 07/01/23  -SP           User Key  (r) =  Recorded By, (t) = Taken By, (c) = Cosigned By    Initials Name Provider Type    Joanna Chambers, PT Physical Therapist                 PT Assessment/Plan     Row Name 06/01/23 1122          PT Assessment    Functional Limitations Limitation in home management;Limitations in community activities;Performance in self-care ADL;Performance in leisure activities;Limitations in functional capacity and performance  -SP     Assessment Comments Patient with chronic history of cervical area pain that has worsened over the last several months.  Patient exhibits significant forward head and shoulder posture.  She presents with cervical pain most notably at right UT area, poor postural, decreased postural muscle strength, decreased flexibility, decreased ROM, and difficulty tolerating home and community ADLs  -SP     Please refer to paper survey for additional self-reported information Yes  -SP     Rehab Potential Good  -SP     Patient/caregiver participated in establishment of treatment plan and goals Yes  -SP     Patient would benefit from skilled therapy intervention Yes  -SP        PT Plan    PT Frequency 1x/week;2x/week  -SP     Predicted Duration of Therapy Intervention (PT) 4 weeks  -SP     Planned CPT's? PT EVAL LOW COMPLEXITY: 39591;PT THER PROC EA 15 MIN: 80580;PT MANUAL THERAPY EA 15 MIN: 03716;PT HOT OR COLD PACK TREAT MCARE;PT ELECTRICAL STIM UNATTEND: ;PT ULTRASOUND EA 15 MIN: 74663  -SP           User Key  (r) = Recorded By, (t) = Taken By, (c) = Cosigned By    Initials Name Provider Type    Joanna Chambers, PT Physical Therapist                 Modalities     Row Name 06/01/23 0900             Moist Heat    MH Applied Yes  -SP      Location bilateral cervical PS and upper trap area: patient reclined with HOB elevated and roll under knees  -SP      PT Moist Heat Minutes 10  -SP      MH Prior to Rx Yes  -SP            User Key  (r) = Recorded By, (t) = Taken By, (c) = Cosigned By     Initials Name Provider Type    Joanna Chambers, PT Physical Therapist               OP Exercises     Row Name 06/01/23 0900             Exercise 1    Exercise Name 1 supine on towel roll  -SP      Cueing 1 Verbal;Demo  -SP      Time 1 2 min  -SP         Exercise 2    Exercise Name 2 cane shoulder flexion in supine  -SP      Cueing 2 Verbal;Demo  -SP      Reps 2 10  -SP         Exercise 3    Exercise Name 3 cervical rotation  -SP      Cueing 3 Verbal;Demo  -SP      Reps 3 10  -SP         Exercise 4    Exercise Name 4 UT stretch  -SP      Cueing 4 Verbal;Demo  -SP      Reps 4 3  -SP      Time 4 10 sec  -SP         Exercise 5    Exercise Name 5 scapula retraction  -SP      Cueing 5 Verbal;Demo  -SP      Reps 5 15  -SP            User Key  (r) = Recorded By, (t) = Taken By, (c) = Cosigned By    Initials Name Provider Type    oJanna Chambers, CHARLEY Physical Therapist              Manual Rx (last 36 hours)     Manual Treatments     Row Name 06/01/23 0900             Manual Rx 1    Manual Rx 1 Location cervical spine  -SP      Manual Rx 1 Type STM, gentle lateral glide, SOR, UT stretch  -SP      Manual Rx 1 Duration 8 min  -SP            User Key  (r) = Recorded By, (t) = Taken By, (c) = Cosigned By    Initials Name Provider Type    Joanna Chambers, CHARLEY Physical Therapist                            Outcome Measure Options: Neck Disability Index (NDI)  Neck Disability Index  Section 1 - Pain Intensity: The pain is moderate and does not vary much.  Section 2 - Personal Care: I need some help but manage most of my personal care.  Section 3 - Lifting: I can lift heavy weights without extra pain  Section 4 - Work: I cannot do my usual work.  Section 5 - Headaches: I have no headaches at all.  Section 6 - Concentration: I can concentrate fully when I want to with slight difficulty.  Section 7 - Sleeping: My sleep is slightly disturbed (less than 1 hour sleepless).  Section 8 - Driving: I can drive  my car without neck pain  Section 9 - Reading: I cannot read at all.  Section 10 - Recreation: I cannot do any recreational activities at all.  Neck Disability Index Score: 21      Time Calculation:     Start Time: 0900  Stop Time: 1000  Time Calculation (min): 60 min  Untimed Charges  PT Eval/Re-eval Minutes: 60  PT Moist Heat Minutes: 10  Total Minutes  Untimed Charges Total Minutes: 60   Total Minutes: 60     Therapy Charges for Today     Code Description Service Date Service Provider Modifiers Qty    73129216125 HC PT EVAL LOW COMPLEXITY 4 6/1/2023 Joanna Robertson, PT GP 1          PT G-Codes  Outcome Measure Options: Neck Disability Index (NDI)  Neck Disability Index Score: 21         Joanna Robertson, PT  6/1/2023

## 2023-06-26 PROBLEM — I48.91 NEW ONSET ATRIAL FIBRILLATION: Status: ACTIVE | Noted: 2023-06-26

## 2023-06-27 PROBLEM — I48.91 ATRIAL FIBRILLATION WITH RVR: Status: ACTIVE | Noted: 2023-06-27

## 2023-08-08 ENCOUNTER — OFFICE VISIT (OUTPATIENT)
Dept: ORTHOPEDIC SURGERY | Facility: CLINIC | Age: 83
End: 2023-08-08
Payer: MEDICARE

## 2023-08-08 DIAGNOSIS — M17.0 PRIMARY OSTEOARTHRITIS OF KNEES, BILATERAL: Primary | ICD-10-CM

## 2023-08-08 NOTE — PROGRESS NOTES
Subjective:     Patient ID: Danuta Batista is a 83 y.o. female.    Chief Complaint:  Follow-up DJD bilateral knees  History of Present Illness  Danuta Batista Returns to clinic for follow-up bilateral knees.  She has received corticosteroid injections in the past is noticing increased pain and swelling bilateral knees.  Pain present along the medial compartment bilaterally greater right than left.  She is experiencing swelling greater right than left and feeling as if the knees are going to give way.  She has been recently released for unrelated cardiac issue where she was experiencing swelling down bilateral lower extremities.  Increased pain noted with extension and flexion of the knees, transitional activities such from seated standing attempting to walk, ambulating long distances and standing for extended periods of time.  The swelling does go down overnight and increases throughout the day with weightbearing activity.  Not currently wearing compression stockings.  Denies any other concerns present.    Social History     Occupational History    Not on file   Tobacco Use    Smoking status: Never    Smokeless tobacco: Never    Tobacco comments:     caff use   Vaping Use    Vaping Use: Never used   Substance and Sexual Activity    Alcohol use: No    Drug use: No    Sexual activity: Defer      Past Medical History:   Diagnosis Date    Bladder infection, chronic     Hyperlipidemia     Hypertension     Kidney stone      Past Surgical History:   Procedure Laterality Date    CAROTID ENDARTERECTOMY      left side    DENTAL PROCEDURE         Family History   Problem Relation Age of Onset    Stroke Father     Hypertension Sister     Stroke Sister     Hypertension Sister     Cancer Sister     Heart disease Brother 77    Hypertension Brother     Cancer Brother     Breast cancer Neg Hx                Objective:  Physical Exam    General: No acute distress.  Eyes: conjunctiva clear; pupils equally round and reactive  ENT: external  ears and nose atraumatic; oropharynx clear  CV: no peripheral edema  Resp: normal respiratory effort  Skin: no rashes or wounds; normal turgor  Psych: mood and affect appropriate; recent and remote memory intact    There were no vitals filed for this visit.  There were no vitals filed for this visit.  There is no height or weight on file to calculate BMI.      Ortho Exam     Bilateral knees examined  Moderate to severe swelling right knee, mild swelling left knee  Stable to varus valgus stress at 0 degrees and 30 degrees  Maximal tenderness present medial compartment, patella  Negative lateral Ayla's exam positive medial Ayla's exam  Positive active patellar compression test  Positive crepitus throughout arc of motion  Knee range of motion 0 degrees to 120 degrees  Mild to moderate swelling bilateral ankles and bilateral feet    Imaging:  Bilateral knee X-Ray  Indication: Pain    AP, Lateral, and [place] views    Findings:  Moderate to advanced tricompartmental osteoarthritis right knee with near bone-on-bone articulation medial compartment with reactive osteophytes in all 3 compartments advanced tricompartmental osteoarthritis left knee with bone-on-bone articulation medial compartment patellofemoral joint  No bony lesion  Normal soft tissues  Normal joint spaces    Prior studies were available for comparison.    Assessment:        1. Primary osteoarthritis of knees, bilateral           Plan:  - Large Joint Arthrocentesis: bilateral knee on 8/8/2023 10:36 AM  Indications: pain  Details: 20 G needle, superolateral approach  Medications (Right): 88 mg Hyaluronan 88 MG/4ML  Medications (Left): 88 mg Hyaluronan 88 MG/4ML  Outcome: tolerated well, no immediate complications  Procedure, treatment alternatives, risks and benefits explained, specific risks discussed. Immediately prior to procedure a time out was called to verify the correct patient, procedure, equipment, support staff and site/side marked as  required. Patient was prepped and draped in the usual sterile fashion.       1.  Discussed plan of care with patient.  Discussed treatment options including proceeding with visco injections bilateral knees which she does wish to proceed with at today's visit.  I do recommend application of ice at injection site this evening.  Discussed compression stockings first thing in the morning before she gets out of bed.  She does have some compression stockings that she has previously purchased.  Discussed starting with compression stockings see if this will decrease the swelling lets collecting bilateral lower extremities.  She verbalized resending of all information agrees with plan of care.  Plan to see her back in clinic in 3 months sooner as needed.  All questions answered.  Orders:  Orders Placed This Encounter   Procedures    - Large Joint Arthrocentesis: bilateral knee    XR Knee 3 View Bilateral     No orders of the defined types were placed in this encounter.          Dragon dictation utilized  We encourage all our patients to maintain a healthy weight - a Body Mass Index of 20-25. This, along with regular exercise and a balanced diet can lower your risk of many illnesses such as diabetes, heart disease, and stroke.

## 2023-08-10 ENCOUNTER — OFFICE VISIT (OUTPATIENT)
Dept: CARDIOLOGY | Facility: CLINIC | Age: 83
End: 2023-08-10
Payer: MEDICARE

## 2023-08-10 VITALS
DIASTOLIC BLOOD PRESSURE: 82 MMHG | HEIGHT: 65 IN | HEART RATE: 75 BPM | WEIGHT: 162 LBS | SYSTOLIC BLOOD PRESSURE: 146 MMHG | BODY MASS INDEX: 26.99 KG/M2

## 2023-08-10 DIAGNOSIS — I48.91 ATRIAL FIBRILLATION WITH RVR: Primary | ICD-10-CM

## 2023-08-10 DIAGNOSIS — I48.19 ATRIAL FIBRILLATION, PERSISTENT: ICD-10-CM

## 2023-08-11 NOTE — PROGRESS NOTES
Date of Office Visit: 08/10/2023  Encounter Provider: DEVONTE Diane  Place of Service: [facility] CARDIOLOGY  Patient Name: Danuta Batista  :1940    Chief Complaint   Patient presents with    Atrial Fibrillation    s/p cardioversion    :     HPI: Danuta Batista is a 83 y.o. female who previously followed with Dr. Davis. She has a history of persistent AF and HTN.     She presented to urgent care in  with complaints of edema and dyspnea.     She was sent to the ED and found to be in AF with RVR.     She underwent DAISY guided CV on  and was subsequently loaded with amiodarone. But relapsed into AF the following day.     Dr. Mayorga saw her. She was in rate controlled AF and felt much better. We decided to continue amiodarone load and bring back for outpatient CV.     She presented on  for CV and was in NSR. Amiodarone was reduced to 200mg daily.                 She presents today for follow up appt.     Since being in NSR she feels better.     She has not had as much edema and her shortness of  breath has improved significantly.     EKG today shows NSR.     She does not feel like she has had any AF since converting on amiodarone.     Currently on 200mg daily.     She is on apixaban for AC.     I ordered surveillance labs today.      Past Medical History:   Diagnosis Date    Bladder infection, chronic     Hyperlipidemia     Hypertension     Kidney stone        Past Surgical History:   Procedure Laterality Date    CAROTID ENDARTERECTOMY      left side    DENTAL PROCEDURE         Social History     Socioeconomic History    Marital status:    Tobacco Use    Smoking status: Never    Smokeless tobacco: Never    Tobacco comments:     caff use   Vaping Use    Vaping Use: Never used   Substance and Sexual Activity    Alcohol use: No    Drug use: No    Sexual activity: Defer       Family History   Problem Relation Age of Onset    Stroke Father     Hypertension Sister      "Stroke Sister     Hypertension Sister     Cancer Sister     Heart disease Brother 77    Hypertension Brother     Cancer Brother     Breast cancer Neg Hx        Review of Systems   Constitutional: Negative for chills, fever and malaise/fatigue.   Cardiovascular:  Negative for chest pain, dyspnea on exertion, leg swelling, near-syncope, orthopnea, palpitations, paroxysmal nocturnal dyspnea and syncope.   Respiratory:  Negative for cough and shortness of breath.    Hematologic/Lymphatic: Negative.    Musculoskeletal:  Negative for joint pain, joint swelling and myalgias.   Gastrointestinal:  Negative for abdominal pain, diarrhea, melena, nausea and vomiting.   Genitourinary:  Negative for frequency and hematuria.   Neurological:  Negative for light-headedness, numbness, paresthesias and seizures.   Allergic/Immunologic: Negative.    All other systems reviewed and are negative.    Allergies   Allergen Reactions    Penicillins Other (See Comments)     \"Makes me numb\"    Sulfa Antibiotics Unknown (See Comments)     \"it's been years ago and I don't remember\"    Ciprofloxacin Anxiety         Current Outpatient Medications:     amiodarone (PACERONE) 200 MG tablet, Take 1 tablet by mouth 2 (Two) Times a Day. Take 1 tablet twice daily for two weeks. Then take 1 tablet daily thereafter until follow up appt. (Patient taking differently: Take 1 tablet by mouth Daily. Take 1 tablet twice daily for two weeks. Then take 1 tablet daily thereafter until follow up appt.), Disp: 120 tablet, Rfl: 0    amLODIPine (NORVASC) 10 MG tablet, Take 1 tablet by mouth Daily., Disp: , Rfl:     apixaban (ELIQUIS) 5 MG tablet tablet, Take 1 tablet by mouth Every 12 (Twelve) Hours. Indications: Atrial Fibrillation, Disp: 60 tablet, Rfl: 11    atorvastatin (LIPITOR) 10 MG tablet, Take 1 tablet by mouth Daily., Disp: , Rfl:     cloNIDine (CATAPRES) 0.2 MG tablet, Take 1 tablet by mouth 2 (Two) Times a Day., Disp: , Rfl:     clopidogrel (PLAVIX) 75 MG " "tablet, Take 1 tablet by mouth Daily., Disp: , Rfl:     furosemide (LASIX) 40 MG tablet, Take 1 tablet by mouth Daily., Disp: 30 tablet, Rfl: 5    metFORMIN (GLUCOPHAGE) 500 MG tablet, Take 1 tablet by mouth 2 (Two) Times a Day With Meals., Disp: , Rfl:     metoprolol succinate XL (TOPROL-XL) 25 MG 24 hr tablet, Take 1 tablet by mouth Daily., Disp: 60 tablet, Rfl: 5    Multiple Vitamins-Minerals (MULTI-COOKIE PO), Take 1 tablet by mouth Daily., Disp: , Rfl:     sodium bicarbonate 650 MG tablet, Take 1 tablet by mouth 3 (Three) Times a Day., Disp: , Rfl:     valsartan (DIOVAN) 320 MG tablet, Take 1 tablet by mouth Daily., Disp: , Rfl:       Objective:     Vitals:    08/10/23 1018   BP: 146/82   Pulse: 75   Weight: 73.5 kg (162 lb)   Height: 165.1 cm (65\")     Body mass index is 26.96 kg/mý.    PHYSICAL EXAM:    Vitals Reviewed.   General Appearance: No acute distress, well developed and well nourished.   Eyes: Conjunctiva and lids: No erythema, swelling, or discharge. Sclera non-icteric.   HENT: Atraumatic, normocephalic. External eyes, ears, and nose normal.   Respiratory: No signs of respiratory distress. Respiration rhythm and depth normal.   Clear to auscultation. No rales, crackles, rhonchi, or wheezing auscultated.   Cardiovascular:  Heart Rate and Rhythm: Normal, Heart Sounds: Normal S1 and S2. No S3 or S4 noted.  Gastrointestinal:  Abdomen soft, non-distended, non-tender.   Musculoskeletal: Normal movement of extremities  Skin: Warm and dry.   Psychiatric: Patient alert and oriented to person, place, and time. Speech and behavior appropriate. Normal mood and affect.       ECG 12 Lead    Date/Time: 8/11/2023 11:04 AM  Performed by: David Schulz APRN  Authorized by: David Schulz APRN   Comparison: compared with previous ECG   Similar to previous ECG  Rhythm: sinus rhythm          Assessment:       Diagnosis Plan   1. Atrial fibrillation with RVR        2. Atrial fibrillation, persistent  Amiodarone Level    " Comprehensive Metabolic Panel    T4, Free    TSH             Plan:   1-2. Persistent atrial fibrillation---- since starting amiodarone and chemical CV she has not had any recurrent atrial fibrillation. Overall she feels well.  I will reduce amiodarone to 200mg daily. I ordered surveillance labs today; TSH, t4, CMP, and amio level.     We discussed potential adverse effects and the need to check labs at least every 6 months. We discussed alternative rate control and have elected to continue amiodarone for now.       She will follow up in 6 months.         As always, it has been a pleasure to participate in your patient's care.      Sincerely,         DEVONTE Diane

## 2023-08-16 ENCOUNTER — TELEPHONE (OUTPATIENT)
Dept: ORTHOPEDIC SURGERY | Facility: CLINIC | Age: 83
End: 2023-08-16
Payer: MEDICARE

## 2023-08-16 NOTE — TELEPHONE ENCOUNTER
Patient advised, she is going to try using ice and resting she will call if she has no improvement for an cortisone injection per Joanna WHITNEY.

## 2023-08-16 NOTE — TELEPHONE ENCOUNTER
Patient calling she received bilateral knee visco on 08.08.2023. Patient reports her Left knee has done well however the Right knee has been causing her a great deal of pain, aching, she reports she can walk on it however it is very painful, she also reports limited ROM, no reported swelling, she does note some bruising right behind the knee 2-3  days after the injection possibly from hitting the back of her leg on the toilet. Patient is taking eliquis, plavix and OTC tylenol arthritis with some relief from the aching.     Encouraged ice, rest, elevation.    Do you have any further recommendations?    William Perea is correct in the chart.

## 2023-08-25 RX ORDER — AMIODARONE HYDROCHLORIDE 200 MG/1
200 TABLET ORAL DAILY
Qty: 90 TABLET | Refills: 0 | Status: SHIPPED | OUTPATIENT
Start: 2023-08-25

## 2023-08-25 NOTE — TELEPHONE ENCOUNTER
Caller: ASCENCION    Relationship: DAUGHTER    Best call back number: [phone]    Requested Prescriptions:   Requested Prescriptions     Pending Prescriptions Disp Refills    amiodarone (PACERONE) 200 MG tablet 120 tablet 0     Sig: Take 1 tablet by mouth 2 (Two) Times a Day. Take 1 tablet twice daily for two weeks. Then take 1 tablet daily thereafter until follow up appt.        Pharmacy where request should be sent: [id number] - [place] [facility] [address] - [phone]  - [phone] FX     Last office visit with prescribing clinician: 8/10/2023   Last telemedicine visit with prescribing clinician: Visit date not found   Next office visit with prescribing clinician: 2/13/2024     Additional details provided by patient: MONTH LEFT-----ASCENCION STATED PATIENT IS ONLY TAKING ONE A DAY AT THIS TIME BUT INSTRUCTIONS STILL SHOW TWO A DAY    Does the patient have less than a 3 day supply:  [] Yes  [x] No    Would you like a call back once the refill request has been completed: [] Yes [] No    If the office needs to give you a call back, can they leave a voicemail: [] Yes [] No    Magui Greenfield   08/25/23 12:18 EDT

## 2023-09-09 ENCOUNTER — HOSPITAL ENCOUNTER (EMERGENCY)
Facility: HOSPITAL | Age: 83
Discharge: HOME | End: 2023-09-09
Attending: EMERGENCY MEDICINE
Payer: MEDICARE

## 2023-09-09 ENCOUNTER — APPOINTMENT (OUTPATIENT)
Dept: GENERAL RADIOLOGY | Facility: HOSPITAL | Age: 83
End: 2023-09-09
Payer: MEDICARE

## 2023-09-09 VITALS
OXYGEN SATURATION: 97 % | TEMPERATURE: 97.8 F | SYSTOLIC BLOOD PRESSURE: 179 MMHG | HEIGHT: 65 IN | BODY MASS INDEX: 26.66 KG/M2 | RESPIRATION RATE: 20 BRPM | HEART RATE: 71 BPM | WEIGHT: 160 LBS | DIASTOLIC BLOOD PRESSURE: 84 MMHG

## 2023-09-09 DIAGNOSIS — M25.561 RIGHT KNEE PAIN, UNSPECIFIED CHRONICITY: Primary | ICD-10-CM

## 2023-09-09 PROCEDURE — 99283 EMERGENCY DEPT VISIT LOW MDM: CPT

## 2023-09-09 PROCEDURE — 73562 X-RAY EXAM OF KNEE 3: CPT

## 2023-09-09 RX ORDER — ACETAMINOPHEN 500 MG
500 TABLET ORAL ONCE
Status: COMPLETED | OUTPATIENT
Start: 2023-09-09 | End: 2023-09-09

## 2023-09-09 RX ADMIN — ACETAMINOPHEN 500 MG: 500 TABLET ORAL at 13:07

## 2023-09-09 NOTE — ED PROVIDER NOTES
"Subjective   History of Present Illness  83-year-old female with past medical history significant for chronic knee pain for which she sees orthopedics and had her knees injected in March of this year who presents emergency room complaining of right knee pain.  Patient states that her right knee bothers her chronically and that she has difficulty bending it more than 30 or 40 degrees without pain.  This morning she states that she stood up and started walking and her knee popped and she has had pain in her knee since that time.  Patient is able to still use her knee no differently than prior but her knee now hurts and she is concerned after the popping.  Patient states she can emergency room get an x-ray of her knee.  Patient states her pain is only in her knee and she has no pain in her hip leg or ankles.  Patient is able to ambulate.    Review of Systems   Constitutional:  Negative for activity change, appetite change, chills, diaphoresis, fatigue and fever.   HENT:  Negative for congestion, rhinorrhea and sore throat.    Eyes:  Negative for photophobia and visual disturbance.   Respiratory:  Negative for cough and shortness of breath.    Cardiovascular:  Negative for chest pain, palpitations and leg swelling.   Gastrointestinal:  Negative for abdominal distention, abdominal pain, diarrhea, nausea and vomiting.   Genitourinary:  Negative for dysuria and flank pain.   Musculoskeletal:  Negative for arthralgias and back pain.        Right knee pain   Skin:  Negative for rash.   Neurological:  Negative for dizziness, weakness and headaches.   Psychiatric/Behavioral:  Negative for agitation and behavioral problems.      Past Medical History:   Diagnosis Date    Bladder infection, chronic     Hyperlipidemia     Hypertension     Kidney stone        Allergies   Allergen Reactions    Penicillins Other (See Comments)     \"Makes me numb\"    Sulfa Antibiotics Unknown (See Comments)     \"it's been years ago and I don't " "remember\"    Ciprofloxacin Anxiety       Past Surgical History:   Procedure Laterality Date    CAROTID ENDARTERECTOMY      left side    DENTAL PROCEDURE         Family History   Problem Relation Age of Onset    Stroke Father     Hypertension Sister     Stroke Sister     Hypertension Sister     Cancer Sister     Heart disease Brother 77    Hypertension Brother     Cancer Brother     Breast cancer Neg Hx        Social History     Socioeconomic History    Marital status:    Tobacco Use    Smoking status: Never    Smokeless tobacco: Never    Tobacco comments:     caff use   Vaping Use    Vaping Use: Never used   Substance and Sexual Activity    Alcohol use: No    Drug use: No    Sexual activity: Defer           Objective   Physical Exam  Vitals and nursing note reviewed.   Constitutional:       General: She is not in acute distress.     Appearance: Normal appearance. She is not ill-appearing, toxic-appearing or diaphoretic.      Comments: Pleasant 83-year-old female in no apparent distress   HENT:      Head: Normocephalic and atraumatic.      Nose: Nose normal.      Mouth/Throat:      Mouth: Mucous membranes are moist.   Eyes:      Conjunctiva/sclera: Conjunctivae normal.   Cardiovascular:      Rate and Rhythm: Normal rate.      Pulses: Normal pulses.   Pulmonary:      Effort: Pulmonary effort is normal.   Abdominal:      General: Abdomen is flat. There is no distension.      Tenderness: There is no abdominal tenderness.   Musculoskeletal:         General: No swelling. Normal range of motion.      Cervical back: Normal range of motion and neck supple.      Right knee: No swelling, deformity, effusion, erythema, ecchymosis or lacerations. Decreased range of motion. Tenderness present. No LCL laxity or MCL laxity.      Instability Tests: Anterior drawer test negative.      Left knee: Normal.      Comments: Patient has generalized tenderness over her knee.  Patient has pain with flexion of her knee greater than 35 " degrees.  This is no different than patient's normal decreased range of motion per patient.    Patient is neurovascular intact distal to the right knee.     Skin:     General: Skin is warm and dry.   Neurological:      General: No focal deficit present.      Mental Status: She is alert and oriented to person, place, and time.   Psychiatric:         Mood and Affect: Mood normal.       Procedures           ED Course  ED Course as of 09/09/23 1309   Sat Sep 09, 2023   1203 X-ray right knee:  IMPRESSION:  Degenerative disease and atherosclerotic disease. No acute findings.     This report was finalized on 9/9/2023 12:00 PM by Dr. Konstantin Brewster MD.   [BH]   1227 Discussed with patient radiologic findings specifically no acute abnormality noted.  Will place patient in knee immobilizer in emergency room and test ability to ambulate.  If okay patient will be discharged home with follow-up with orthopedics outpatiently.  Patient instructed to take Tylenol as needed for pain as she is on blood thinners and at significant risk for narcotic pain medication for falls patient can return emergency room for new persistent or worsening symptoms as well.  Patient states he understands and agrees with plan [BH]   1230 .  [BH]   1307 Patient refused knee immobilizer as she stated she could not bend her knee if that was placed.  Patient requesting knee brace.  Informed patient that the knee immobilizer is to keep her from moving her knee until it feels better and that this would help her with her pain until she could see orthopedics.  Patient refused knee immobilizer and requesting knee brace instead.  Knee brace placed and patient able to ambulate at baseline. []      ED Course User Index  [] Henrik Preciado MD                                           Medical Decision Making  My diagnosis for lower extremity pain and injury includes but is not limited to hip fracture, femur fracture, hip dislocation, hip contusion, hip  sprain, hip strain, pelvic fracture, ischio-tibial band pain, ischio-tibial band bursitis, knee sprain, patella dislocation, knee dislocation, internal derangement of knee, fractures of the femur, tibia, fibula, ankle, foot and digits, ankle sprain, ankle dislocation, Lisfranc fracture, fracture dislocations of the digits, pulmonary embolism, claudication, peripheral vascular disease, gout, osteoarthritis, rheumatoid arthritis, bursitis, septic joint, poly-rheumatica, polyarthralgia and other inflammatory or infectious disease processes.     Amount and/or Complexity of Data Reviewed  Radiology: ordered. Decision-making details documented in ED Course.        Final diagnoses:   Right knee pain, unspecified chronicity       ED Disposition  ED Disposition       ED Disposition   Discharge    Condition   Stable    Comment   --               Joanna Parrish, APRN  [address] [facility] [address]  [facility] [id number] [phone]    Schedule an appointment as soon as possible for a visit       [facility] EMERGENCY DEPARTMENT  [address] [facility] [address] [phone]  Go to   As needed         Medication List      No changes were made to your prescriptions during this visit.            Henrik Preciado MD  09/09/23 5974

## 2023-09-11 ENCOUNTER — OFFICE VISIT (OUTPATIENT)
Dept: ORTHOPEDIC SURGERY | Facility: CLINIC | Age: 83
End: 2023-09-11
Payer: MEDICARE

## 2023-09-11 VITALS — HEIGHT: 65 IN | WEIGHT: 160 LBS | BODY MASS INDEX: 26.66 KG/M2

## 2023-09-11 DIAGNOSIS — M17.11 PRIMARY OSTEOARTHRITIS OF RIGHT KNEE: Primary | ICD-10-CM

## 2023-09-11 RX ADMIN — LIDOCAINE HYDROCHLORIDE 8 ML: 10 INJECTION, SOLUTION EPIDURAL; INFILTRATION; INTRACAUDAL; PERINEURAL at 14:59

## 2023-09-11 RX ADMIN — TRIAMCINOLONE ACETONIDE 80 MG: 40 INJECTION, SUSPENSION INTRA-ARTICULAR; INTRAMUSCULAR at 14:59

## 2023-09-11 NOTE — PROGRESS NOTES
Subjective:     Patient ID: Danuta Batista is a 83 y.o. female.    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  She presented to the ER for worsening of pain at the right knee over the weekend.  Approximately a week or so ago she was ambulating when she felt a pop at the medial aspect of the knee began experiencing pretty severe pain and presents today for reevaluation.  X-ray images were completed she is experiencing swelling at the right knee.  After receiving viscosupplementation injections she began noticing worsening of pain at the right knee only.  She did receive viscosupplementation injections bilateral knees and the left knee seems to be doing very well she is not experiencing any significant pain at the knee.  She is experiencing pain with extension and flexion of the right knee positive tenderness along the medial and lateral joint line also present along the anterior aspect of the knee.  She is ambulating with a cane but is unable to tolerate ambulating long distances and standing for extended periods of time, unable to tolerate ambulating on uneven surfaces due to the pain at the knee.  She was provided with knee immobilizer however unable to wear.  Denies any other concerns present.    Social History     Occupational History    Not on file   Tobacco Use    Smoking status: Never     Passive exposure: Never    Smokeless tobacco: Never    Tobacco comments:     caff use   Vaping Use    Vaping Use: Never used   Substance and Sexual Activity    Alcohol use: No    Drug use: No    Sexual activity: Defer      Past Medical History:   Diagnosis Date    Atrial fibrillation     Bladder infection, chronic     Hyperlipidemia     Hypertension     Kidney stone      Past Surgical History:   Procedure Laterality Date    CAROTID  "ENDARTERECTOMY      left side    DENTAL PROCEDURE         Family History   Problem Relation Age of Onset    Stroke Father     Hypertension Sister     Stroke Sister     Hypertension Sister     Cancer Sister     Heart disease Brother 77    Hypertension Brother     Cancer Brother     Breast cancer Neg Hx                Objective:  Physical Exam    General: No acute distress.  Eyes: conjunctiva clear; pupils equally round and reactive  ENT: external ears and nose atraumatic; oropharynx clear  CV: no peripheral edema  Resp: normal respiratory effort  Skin: no rashes or wounds; normal turgor  Psych: mood and affect appropriate; recent and remote memory intact    Vitals:    09/11/23 1420   Weight: 72.6 kg (160 lb)   Height: 165.1 cm (65\")         09/11/23  1420   Weight: 72.6 kg (160 lb)     Body mass index is 26.63 kg/m².      Ortho Exam     Right knee examined  Knee range of motion 0 to 90 degrees  Stable to varus and valgus stress at 0 degrees and 30 degrees  1+ anterior Lachman  Moderate to severe swelling, positive fusion, mild  Positive tenderness along the medial and lateral joint line, patellofemoral joint  Negative anterior posterior drawer exam  Positive active patellar compression test  Positive crepitus throughout arc of motion    Imaging:    XR Knee 3 View Right    Result Date: 9/9/2023  Degenerative disease and atherosclerotic disease. No acute findings.  This report was finalized on 9/9/2023 12:00 PM by Dr. Konstantin Brewster MD.    Independently reviewed x-ray imaging 3 view right knee completed  lag tricompartmental osteoarthritis no other acute osseous abnormality identified on x-ray imaging.  Assessment:        1. Primary osteoarthritis of right knee           Plan:    Large Joint Arthrocentesis: R knee  Date/Time: 9/11/2023 2:59 PM  Consent given by: patient  Site marked: site marked  Timeout: Immediately prior to procedure a time out was called to verify the correct patient, procedure, equipment, " support staff and site/side marked as required   Supporting Documentation  Indications: pain   Procedure Details  Location: knee - R knee  Preparation: Patient was prepped and draped in the usual sterile fashion  Needle size: 22 G  Approach: superior  Medications administered: 8 mL lidocaine PF 1% 1 %; 80 mg triamcinolone acetonide 40 MG/ML  Patient tolerance: patient tolerated the procedure well with no immediate complications        1.  Discussed plan of care with patient.  We did discuss proceeding with corticosteroid injection right knee only I do recommend application of ice at injection site this evening.  Ace wrap provided at the knee discussed to continue with active range of motion we will gladly see her back in clinic previously scheduled appointment although we will push out the appointment for the next few weeks as she was scheduled to follow back up on 9/19/2023.  We will gladly see her sooner for the left knee if needed.  All questions answered.  Orders:  Orders Placed This Encounter   Procedures    Large Joint Arthrocentesis: R knee     No orders of the defined types were placed in this encounter.        Dragon dictation utilized

## 2023-09-12 RX ORDER — LIDOCAINE HYDROCHLORIDE 10 MG/ML
8 INJECTION, SOLUTION EPIDURAL; INFILTRATION; INTRACAUDAL; PERINEURAL
Status: COMPLETED | OUTPATIENT
Start: 2023-09-11 | End: 2023-09-11

## 2023-09-12 RX ORDER — TRIAMCINOLONE ACETONIDE 40 MG/ML
80 INJECTION, SUSPENSION INTRA-ARTICULAR; INTRAMUSCULAR
Status: COMPLETED | OUTPATIENT
Start: 2023-09-11 | End: 2023-09-11

## 2023-09-14 ENCOUNTER — LAB (OUTPATIENT)
Dept: LAB | Facility: HOSPITAL | Age: 83
End: 2023-09-14
Payer: MEDICARE

## 2023-09-14 ENCOUNTER — TRANSCRIBE ORDERS (OUTPATIENT)
Dept: ADMINISTRATIVE | Facility: HOSPITAL | Age: 83
End: 2023-09-14
Payer: MEDICARE

## 2023-09-14 DIAGNOSIS — E11.9 DIABETES MELLITUS WITHOUT COMPLICATION: ICD-10-CM

## 2023-09-14 DIAGNOSIS — I10 ESSENTIAL HYPERTENSION, MALIGNANT: ICD-10-CM

## 2023-09-14 DIAGNOSIS — D69.6 THROMBOCYTOPENIA, UNSPECIFIED: ICD-10-CM

## 2023-09-14 DIAGNOSIS — E78.5 HYPERLIPIDEMIA, UNSPECIFIED HYPERLIPIDEMIA TYPE: ICD-10-CM

## 2023-09-14 DIAGNOSIS — I10 ESSENTIAL HYPERTENSION, MALIGNANT: Primary | ICD-10-CM

## 2023-09-14 LAB
ALBUMIN SERPL-MCNC: 4.8 G/DL (ref 3.5–5.2)
ALBUMIN/GLOB SERPL: 1.5 G/DL
ALP SERPL-CCNC: 77 U/L (ref 39–117)
ALT SERPL W P-5'-P-CCNC: 27 U/L (ref 1–33)
ANION GAP SERPL CALCULATED.3IONS-SCNC: 13.5 MMOL/L (ref 5–15)
AST SERPL-CCNC: 26 U/L (ref 1–32)
BASOPHILS # BLD AUTO: 0.02 10*3/MM3 (ref 0–0.2)
BASOPHILS NFR BLD AUTO: 0.2 % (ref 0–1.5)
BILIRUB SERPL-MCNC: 0.8 MG/DL (ref 0–1.2)
BUN SERPL-MCNC: 24 MG/DL (ref 8–23)
BUN/CREAT SERPL: 20.7 (ref 7–25)
CALCIUM SPEC-SCNC: 10.7 MG/DL (ref 8.6–10.5)
CHLORIDE SERPL-SCNC: 100 MMOL/L (ref 98–107)
CHOLEST SERPL-MCNC: 179 MG/DL (ref 0–200)
CO2 SERPL-SCNC: 23.5 MMOL/L (ref 22–29)
CREAT SERPL-MCNC: 1.16 MG/DL (ref 0.57–1)
DEPRECATED RDW RBC AUTO: 44.9 FL (ref 37–54)
EGFRCR SERPLBLD CKD-EPI 2021: 46.9 ML/MIN/1.73
EOSINOPHIL # BLD AUTO: 0 10*3/MM3 (ref 0–0.4)
EOSINOPHIL NFR BLD AUTO: 0 % (ref 0.3–6.2)
ERYTHROCYTE [DISTWIDTH] IN BLOOD BY AUTOMATED COUNT: 13.1 % (ref 12.3–15.4)
GLOBULIN UR ELPH-MCNC: 3.3 GM/DL
GLUCOSE SERPL-MCNC: 130 MG/DL (ref 65–99)
HBA1C MFR BLD: 7.1 % (ref 4.8–5.6)
HCT VFR BLD AUTO: 47.4 % (ref 34–46.6)
HDLC SERPL-MCNC: 35 MG/DL (ref 40–60)
HGB BLD-MCNC: 16.1 G/DL (ref 12–15.9)
IMM GRANULOCYTES # BLD AUTO: 0.13 10*3/MM3 (ref 0–0.05)
IMM GRANULOCYTES NFR BLD AUTO: 1.4 % (ref 0–0.5)
LDLC SERPL CALC-MCNC: 88 MG/DL (ref 0–100)
LDLC/HDLC SERPL: 2.19 {RATIO}
LYMPHOCYTES # BLD AUTO: 0.81 10*3/MM3 (ref 0.7–3.1)
LYMPHOCYTES NFR BLD AUTO: 8.6 % (ref 19.6–45.3)
MCH RBC QN AUTO: 31.3 PG (ref 26.6–33)
MCHC RBC AUTO-ENTMCNC: 34 G/DL (ref 31.5–35.7)
MCV RBC AUTO: 92 FL (ref 79–97)
MONOCYTES # BLD AUTO: 0.51 10*3/MM3 (ref 0.1–0.9)
MONOCYTES NFR BLD AUTO: 5.4 % (ref 5–12)
NEUTROPHILS NFR BLD AUTO: 8 10*3/MM3 (ref 1.7–7)
NEUTROPHILS NFR BLD AUTO: 84.4 % (ref 42.7–76)
NRBC BLD AUTO-RTO: 0.1 /100 WBC (ref 0–0.2)
PLATELET # BLD AUTO: 223 10*3/MM3 (ref 140–450)
PMV BLD AUTO: 11.4 FL (ref 6–12)
POTASSIUM SERPL-SCNC: 4.5 MMOL/L (ref 3.5–5.2)
PROT SERPL-MCNC: 8.1 G/DL (ref 6–8.5)
RBC # BLD AUTO: 5.15 10*6/MM3 (ref 3.77–5.28)
SODIUM SERPL-SCNC: 137 MMOL/L (ref 136–145)
TRIGL SERPL-MCNC: 337 MG/DL (ref 0–150)
VLDLC SERPL-MCNC: 56 MG/DL (ref 5–40)
WBC NRBC COR # BLD: 9.47 10*3/MM3 (ref 3.4–10.8)

## 2023-09-14 PROCEDURE — 85025 COMPLETE CBC W/AUTO DIFF WBC: CPT

## 2023-09-14 PROCEDURE — 80061 LIPID PANEL: CPT

## 2023-09-14 PROCEDURE — 80053 COMPREHEN METABOLIC PANEL: CPT

## 2023-09-14 PROCEDURE — 36415 COLL VENOUS BLD VENIPUNCTURE: CPT

## 2023-09-14 PROCEDURE — 83036 HEMOGLOBIN GLYCOSYLATED A1C: CPT

## 2023-11-17 RX ORDER — AMIODARONE HYDROCHLORIDE 200 MG/1
200 TABLET ORAL DAILY
Qty: 90 TABLET | Refills: 0 | Status: SHIPPED | OUTPATIENT
Start: 2023-11-17

## 2023-12-20 RX ORDER — FUROSEMIDE 40 MG/1
40 TABLET ORAL DAILY
Qty: 30 TABLET | Refills: 5 | Status: SHIPPED | OUTPATIENT
Start: 2023-12-20

## 2023-12-20 NOTE — TELEPHONE ENCOUNTER
RX sent.    Thank you,  Radha ZEPEDA RN  [place] [facility] [place]   15:15 EST

## 2023-12-20 NOTE — TELEPHONE ENCOUNTER
Called Krystal for additional information.    Krystal stated that patient has been taking medication and just ran out of refills.  Krystal denies that patient is having any issues currently.    Please let me know how you would like to proceed.    Thank you,  Radha ZEPEDA RN  Triage Nurse [facility] [place]   13:40 EST

## 2023-12-20 NOTE — TELEPHONE ENCOUNTER
Notified Krystal that requested RX has been sent to pharmacy.    Thank you,  Radha ZEPEDA RN  Triage Nurse [facility] [place]   15:16 EST

## 2023-12-20 NOTE — TELEPHONE ENCOUNTER
Krystal Nino, Danuta Batista's daughter, called on behalf of patient to request refill on furosemide 40 mg, daily to be sent to [facility] on [address].    LOV: 8/10/2023 with David Schulz    Please let me know how you would like to proceed.    Thank you,  Radha ZEPEDA RN  Triage Nurse [facility] [place]   10:18 EST

## 2024-02-06 ENCOUNTER — HOSPITAL ENCOUNTER (OUTPATIENT)
Dept: ULTRASOUND IMAGING | Facility: HOSPITAL | Age: 84
Discharge: HOME | End: 2024-02-06
Payer: MEDICARE

## 2024-02-06 ENCOUNTER — TRANSCRIBE ORDERS (OUTPATIENT)
Dept: ADMINISTRATIVE | Facility: HOSPITAL | Age: 84
End: 2024-02-06
Payer: MEDICARE

## 2024-02-06 ENCOUNTER — LAB (OUTPATIENT)
Dept: LAB | Facility: HOSPITAL | Age: 84
End: 2024-02-06
Payer: MEDICARE

## 2024-02-06 DIAGNOSIS — N20.0 RENAL CALCULUS: ICD-10-CM

## 2024-02-06 DIAGNOSIS — I48.19 ATRIAL FIBRILLATION, PERSISTENT: ICD-10-CM

## 2024-02-06 DIAGNOSIS — N20.0 KIDNEY STONE: Primary | ICD-10-CM

## 2024-02-06 DIAGNOSIS — N20.0 KIDNEY STONE: ICD-10-CM

## 2024-02-06 LAB
ALBUMIN SERPL-MCNC: 4.8 G/DL (ref 3.5–5.2)
ALBUMIN/GLOB SERPL: 1.6 G/DL
ALP SERPL-CCNC: 99 U/L (ref 39–117)
ALT SERPL W P-5'-P-CCNC: 18 U/L (ref 1–33)
ANION GAP SERPL CALCULATED.3IONS-SCNC: 11.6 MMOL/L (ref 5–15)
AST SERPL-CCNC: 18 U/L (ref 1–32)
BILIRUB SERPL-MCNC: 0.6 MG/DL (ref 0–1.2)
BUN SERPL-MCNC: 20 MG/DL (ref 8–23)
BUN/CREAT SERPL: 16.3 (ref 7–25)
CALCIUM SPEC-SCNC: 10.5 MG/DL (ref 8.6–10.5)
CHLORIDE SERPL-SCNC: 100 MMOL/L (ref 98–107)
CO2 SERPL-SCNC: 28.4 MMOL/L (ref 22–29)
CREAT SERPL-MCNC: 1.23 MG/DL (ref 0.57–1)
EGFRCR SERPLBLD CKD-EPI 2021: 43.7 ML/MIN/1.73
GLOBULIN UR ELPH-MCNC: 3 GM/DL
GLUCOSE SERPL-MCNC: 136 MG/DL (ref 65–99)
POTASSIUM SERPL-SCNC: 3.8 MMOL/L (ref 3.5–5.2)
PROT SERPL-MCNC: 7.8 G/DL (ref 6–8.5)
SODIUM SERPL-SCNC: 140 MMOL/L (ref 136–145)
T4 FREE SERPL-MCNC: 1.54 NG/DL (ref 0.93–1.7)
TSH SERPL DL<=0.05 MIU/L-ACNC: 1.91 UIU/ML (ref 0.27–4.2)

## 2024-02-06 PROCEDURE — 80151 DRUG ASSAY AMIODARONE: CPT

## 2024-02-06 PROCEDURE — 36415 COLL VENOUS BLD VENIPUNCTURE: CPT

## 2024-02-06 PROCEDURE — 76775 US EXAM ABDO BACK WALL LIM: CPT

## 2024-02-06 PROCEDURE — 80053 COMPREHEN METABOLIC PANEL: CPT

## 2024-02-06 PROCEDURE — 84439 ASSAY OF FREE THYROXINE: CPT

## 2024-02-06 PROCEDURE — 84443 ASSAY THYROID STIM HORMONE: CPT

## 2024-02-14 LAB
AMIODARONE SERPL-MCNC: 1686 NG/ML (ref 1000–2500)
DESETHYLAMIODARONE SERPL-MCNC: 619 NG/ML

## 2024-02-15 RX ORDER — AMIODARONE HYDROCHLORIDE 200 MG/1
200 TABLET ORAL DAILY
Qty: 90 TABLET | Refills: 0 | Status: SHIPPED | OUTPATIENT
Start: 2024-02-15

## 2024-02-20 ENCOUNTER — OFFICE VISIT (OUTPATIENT)
Age: 84
End: 2024-02-20
Payer: MEDICARE

## 2024-02-20 VITALS
DIASTOLIC BLOOD PRESSURE: 82 MMHG | HEIGHT: 65 IN | BODY MASS INDEX: 26.33 KG/M2 | HEART RATE: 64 BPM | SYSTOLIC BLOOD PRESSURE: 144 MMHG | WEIGHT: 158 LBS

## 2024-02-20 DIAGNOSIS — I48.91 ATRIAL FIBRILLATION WITH RVR: ICD-10-CM

## 2024-02-20 DIAGNOSIS — R93.89 ABNORMAL FINDINGS ON DIAGNOSTIC IMAGING OF OTHER SPECIFIED BODY STRUCTURES: ICD-10-CM

## 2024-02-20 DIAGNOSIS — R07.89 CHEST DISCOMFORT: Primary | ICD-10-CM

## 2024-02-20 PROCEDURE — 93000 ELECTROCARDIOGRAM COMPLETE: CPT

## 2024-02-20 PROCEDURE — 99214 OFFICE O/P EST MOD 30 MIN: CPT

## 2024-02-20 PROCEDURE — 1160F RVW MEDS BY RX/DR IN RCRD: CPT

## 2024-02-20 PROCEDURE — 1159F MED LIST DOCD IN RCRD: CPT

## 2024-02-20 RX ORDER — FUROSEMIDE 40 MG/1
40 TABLET ORAL DAILY
Start: 2024-02-20

## 2024-02-20 RX ORDER — FUROSEMIDE 40 MG/1
40 TABLET ORAL DAILY
Start: 2024-02-20 | End: 2024-02-20 | Stop reason: SDUPTHER

## 2024-02-20 NOTE — PROGRESS NOTES
Date of Office Visit: 2024  Encounter Provider: DEVONTE Diane  Place of Service: [facility] CARDIOLOGY  Patient Name: Danuta Batista  :1940    Chief Complaint   Patient presents with    Atrial Fibrillation w/RVR    persistent AFIB   :     HPI: Danuta Batista is a 83 y.o. female who previously followed with Dr. Davis. She has a history of persistent AF and HTN.      She presented to urgent care in  with complaints of edema and dyspnea.      She was sent to the ED and found to be in AF with RVR.      She underwent DAISY guided CV on  and was subsequently loaded with amiodarone. But relapsed into AF the following day.      Dr. Mayorga saw her. She was in rate controlled AF and felt much better. We decided to continue amiodarone load and bring back for outpatient CV.      She presented on  for CV and was in NSR. Amiodarone was reduced to 200mg daily.                      She presents today for follow up appt.     Since starting amiodarone last year she has maintained NSR.     Surveillance labs, TSH and CMP are WNL.     She has not had any recurrent AF.     Maintained on amiodarone 200mg daily.     She has no cardiac complaints or concerns.     She is having a lot of arthritic pain which is her only complaint.     EKG shows NSR.     Apixaban for AC.   Past Medical History:   Diagnosis Date    Atrial fibrillation     Bladder infection, chronic     Hyperlipidemia     Hypertension     Kidney stone        Past Surgical History:   Procedure Laterality Date    CAROTID ENDARTERECTOMY      left side    DENTAL PROCEDURE         Social History     Socioeconomic History    Marital status:    Tobacco Use    Smoking status: Never     Passive exposure: Never    Smokeless tobacco: Never    Tobacco comments:     caff use   Vaping Use    Vaping Use: Never used   Substance and Sexual Activity    Alcohol use: No    Drug use: No    Sexual activity: Defer       Family History   Problem  "Relation Age of Onset    Stroke Father     Hypertension Sister     Stroke Sister     Hypertension Sister     Cancer Sister     Heart disease Brother 77    Hypertension Brother     Cancer Brother     Breast cancer Neg Hx        Review of Systems   Constitutional: Negative for chills, fever and malaise/fatigue.   Cardiovascular:  Negative for chest pain, dyspnea on exertion, leg swelling, near-syncope, orthopnea, palpitations, paroxysmal nocturnal dyspnea and syncope.   Respiratory:  Negative for cough and shortness of breath.    Hematologic/Lymphatic: Negative.    Musculoskeletal:  Negative for joint pain, joint swelling and myalgias.   Gastrointestinal:  Negative for abdominal pain, diarrhea, melena, nausea and vomiting.   Genitourinary:  Negative for frequency and hematuria.   Neurological:  Negative for light-headedness, numbness, paresthesias and seizures.   Allergic/Immunologic: Negative.    All other systems reviewed and are negative.      Allergies   Allergen Reactions    Penicillins Other (See Comments)     \"Makes me numb\"    Sulfa Antibiotics Unknown (See Comments)     \"it's been years ago and I don't remember\"    Ciprofloxacin Anxiety         Current Outpatient Medications:     amiodarone (PACERONE) 200 MG tablet, TAKE 1 TABLET BY MOUTH DAILY, Disp: 90 tablet, Rfl: 0    amLODIPine (NORVASC) 10 MG tablet, Take 1 tablet by mouth Daily., Disp: , Rfl:     apixaban (ELIQUIS) 5 MG tablet tablet, Take 1 tablet by mouth Every 12 (Twelve) Hours. Indications: Atrial Fibrillation, Disp: 60 tablet, Rfl: 11    atorvastatin (LIPITOR) 10 MG tablet, Take 1 tablet by mouth Daily., Disp: , Rfl:     cloNIDine (CATAPRES) 0.2 MG tablet, Take 1 tablet by mouth 2 (Two) Times a Day., Disp: , Rfl:     clopidogrel (PLAVIX) 75 MG tablet, Take 1 tablet by mouth Daily., Disp: , Rfl:     furosemide (LASIX) 40 MG tablet, Take 1 tablet by mouth Daily., Disp: , Rfl:     metFORMIN (GLUCOPHAGE) 500 MG tablet, Take 1 tablet by mouth 2 (Two) " "Times a Day With Meals., Disp: , Rfl:     metoprolol succinate XL (TOPROL-XL) 25 MG 24 hr tablet, Take 1 tablet by mouth Daily., Disp: 60 tablet, Rfl: 5    Multiple Vitamins-Minerals (MULTI-COOKIE PO), Take 1 tablet by mouth Daily., Disp: , Rfl:     sodium bicarbonate 650 MG tablet, Take 1 tablet by mouth 3 (Three) Times a Day., Disp: , Rfl:     valsartan (DIOVAN) 320 MG tablet, Take 1 tablet by mouth Daily., Disp: , Rfl:       Objective:     Vitals:    02/20/24 1246   BP: 144/82   Pulse: 64   Weight: 71.7 kg (158 lb)   Height: 165.1 cm (65\")     Body mass index is 26.29 kg/m².    PHYSICAL EXAM:    Vitals Reviewed.   General Appearance: No acute distress, well developed and well nourished.   Eyes: Conjunctiva and lids: No erythema, swelling, or discharge. Sclera non-icteric.   HENT: Atraumatic, normocephalic. External eyes, ears, and nose normal.   Respiratory: No signs of respiratory distress. Respiration rhythm and depth normal.   Clear to auscultation. No rales, crackles, rhonchi, or wheezing auscultated.   Cardiovascular:  Heart Rate and Rhythm: Normal, Heart Sounds: Normal S1 and S2. No S3 or S4 noted.  Gastrointestinal:  Abdomen soft, non-distended, non-tender.   Musculoskeletal: Normal movement of extremities  Skin: Warm and dry.   Psychiatric: Patient alert and oriented to person, place, and time. Speech and behavior appropriate. Normal mood and affect.       ECG 12 Lead    Date/Time: 2/20/2024 1:29 PM  Performed by: David Schulz APRN    Authorized by: David Schulz APRN  Comparison: compared with previous ECG   Similar to previous ECG  Rhythm: sinus rhythm            Assessment:       Diagnosis Plan   1. Chest discomfort  XR Chest 2 View    TSH    T4, Free    Comprehensive Metabolic Panel    Amiodarone Level      2. Abnormal findings on diagnostic imaging of other specified body structures  TSH    T4, Free      3. Atrial fibrillation with RVR               Plan:   1-3. Persistent AF---- her AF is well " controlled on amiodaorne, she has not had any recurrent AF since starting low dose amiodaorne last year. Her labs are WNL. We discussed risks, benefits, and alternatives. She prefers to continue. Will order chest xray and surveillance labs. Continue amiodarone at 200mg daily.         Follow up in 6 months.           As always, it has been a pleasure to participate in your patient's care.      Sincerely,         DEVONTE Diane

## 2024-03-04 ENCOUNTER — LAB (OUTPATIENT)
Dept: LAB | Facility: HOSPITAL | Age: 84
End: 2024-03-04
Payer: MEDICARE

## 2024-03-04 ENCOUNTER — TRANSCRIBE ORDERS (OUTPATIENT)
Dept: ADMINISTRATIVE | Facility: HOSPITAL | Age: 84
End: 2024-03-04
Payer: MEDICARE

## 2024-03-04 DIAGNOSIS — E78.5 HYPERLIPIDEMIA, UNSPECIFIED HYPERLIPIDEMIA TYPE: Primary | ICD-10-CM

## 2024-03-04 DIAGNOSIS — R73.01 IMPAIRED FASTING GLUCOSE: ICD-10-CM

## 2024-03-04 DIAGNOSIS — D69.6 THROMBOCYTOPENIA, UNSPECIFIED: ICD-10-CM

## 2024-03-04 DIAGNOSIS — E78.5 HYPERLIPIDEMIA, UNSPECIFIED HYPERLIPIDEMIA TYPE: ICD-10-CM

## 2024-03-04 LAB
BASOPHILS # BLD AUTO: 0.07 10*3/MM3 (ref 0–0.2)
BASOPHILS NFR BLD AUTO: 1.1 % (ref 0–1.5)
CHOLEST SERPL-MCNC: 177 MG/DL (ref 0–200)
DEPRECATED RDW RBC AUTO: 42.7 FL (ref 37–54)
EOSINOPHIL # BLD AUTO: 0.19 10*3/MM3 (ref 0–0.4)
EOSINOPHIL NFR BLD AUTO: 3 % (ref 0.3–6.2)
ERYTHROCYTE [DISTWIDTH] IN BLOOD BY AUTOMATED COUNT: 13.4 % (ref 12.3–15.4)
HBA1C MFR BLD: 7.2 % (ref 4.8–5.6)
HCT VFR BLD AUTO: 43.5 % (ref 34–46.6)
HDLC SERPL-MCNC: 29 MG/DL (ref 40–60)
HGB BLD-MCNC: 14.1 G/DL (ref 12–15.9)
IMM GRANULOCYTES # BLD AUTO: 0.02 10*3/MM3 (ref 0–0.05)
IMM GRANULOCYTES NFR BLD AUTO: 0.3 % (ref 0–0.5)
LDLC SERPL CALC-MCNC: 92 MG/DL (ref 0–100)
LDLC/HDLC SERPL: 2.77 {RATIO}
LYMPHOCYTES # BLD AUTO: 1.35 10*3/MM3 (ref 0.7–3.1)
LYMPHOCYTES NFR BLD AUTO: 21.1 % (ref 19.6–45.3)
MCH RBC QN AUTO: 28.7 PG (ref 26.6–33)
MCHC RBC AUTO-ENTMCNC: 32.4 G/DL (ref 31.5–35.7)
MCV RBC AUTO: 88.4 FL (ref 79–97)
MONOCYTES # BLD AUTO: 0.51 10*3/MM3 (ref 0.1–0.9)
MONOCYTES NFR BLD AUTO: 8 % (ref 5–12)
NEUTROPHILS NFR BLD AUTO: 4.26 10*3/MM3 (ref 1.7–7)
NEUTROPHILS NFR BLD AUTO: 66.5 % (ref 42.7–76)
NRBC BLD AUTO-RTO: 0 /100 WBC (ref 0–0.2)
PLATELET # BLD AUTO: 183 10*3/MM3 (ref 140–450)
PMV BLD AUTO: 11.6 FL (ref 6–12)
RBC # BLD AUTO: 4.92 10*6/MM3 (ref 3.77–5.28)
TRIGL SERPL-MCNC: 338 MG/DL (ref 0–150)
VLDLC SERPL-MCNC: 56 MG/DL (ref 5–40)
WBC NRBC COR # BLD AUTO: 6.4 10*3/MM3 (ref 3.4–10.8)

## 2024-03-04 PROCEDURE — 80061 LIPID PANEL: CPT

## 2024-03-04 PROCEDURE — 36415 COLL VENOUS BLD VENIPUNCTURE: CPT

## 2024-03-04 PROCEDURE — 83036 HEMOGLOBIN GLYCOSYLATED A1C: CPT

## 2024-03-04 PROCEDURE — 85025 COMPLETE CBC W/AUTO DIFF WBC: CPT

## 2024-03-18 ENCOUNTER — OFFICE VISIT (OUTPATIENT)
Dept: ORTHOPEDIC SURGERY | Facility: CLINIC | Age: 84
End: 2024-03-18
Payer: MEDICARE

## 2024-03-18 VITALS — HEIGHT: 65 IN | BODY MASS INDEX: 26.33 KG/M2 | WEIGHT: 158 LBS

## 2024-03-18 DIAGNOSIS — M17.0 PRIMARY OSTEOARTHRITIS OF KNEES, BILATERAL: Primary | ICD-10-CM

## 2024-03-18 DIAGNOSIS — M17.11 PRIMARY OSTEOARTHRITIS OF RIGHT KNEE: ICD-10-CM

## 2024-03-18 PROCEDURE — 20610 DRAIN/INJ JOINT/BURSA W/O US: CPT | Performed by: NURSE PRACTITIONER

## 2024-03-18 RX ADMIN — LIDOCAINE HYDROCHLORIDE 8 ML: 10 INJECTION, SOLUTION EPIDURAL; INFILTRATION; INTRACAUDAL; PERINEURAL at 10:31

## 2024-03-18 RX ADMIN — TRIAMCINOLONE ACETONIDE 80 MG: 40 INJECTION, SUSPENSION INTRA-ARTICULAR; INTRAMUSCULAR at 10:31

## 2024-03-18 NOTE — PROGRESS NOTES
Subjective:     Patient ID: Danuta Batista is a 83 y.o. female.    Chief Complaint:  Follow-up DJD bilateral knees  Corticosteroid injections 09/11/2024 - right knee  Visco injections bilateral knees 08/08/2023  History of Present Illness  Danuta Batista Returns to clinic today for follow-up bilateral knees.  She has received viscosupplementation injections shortly after received corticosteroid injection right knee.  The left knee has done quite well since receiving the viscosupplementation injection she has noted some slight swelling return and pain present bilateral knees.  She presents today with request for repeat corticosteroid injections bilateral knees.  Denies any other concerns present.     Social History     Occupational History    Not on file   Tobacco Use    Smoking status: Never     Passive exposure: Never    Smokeless tobacco: Never    Tobacco comments:     caff use   Vaping Use    Vaping status: Never Used   Substance and Sexual Activity    Alcohol use: No    Drug use: No    Sexual activity: Defer      Past Medical History:   Diagnosis Date    Atrial fibrillation     Bladder infection, chronic     Hyperlipidemia     Hypertension     Kidney stone      Past Surgical History:   Procedure Laterality Date    CAROTID ENDARTERECTOMY      left side    DENTAL PROCEDURE         Family History   Problem Relation Age of Onset    Stroke Father     Hypertension Sister     Stroke Sister     Hypertension Sister     Cancer Sister     Heart disease Brother 77    Hypertension Brother     Cancer Brother     Breast cancer Neg Hx                Objective:  Physical Exam  General: No acute distress.  Eyes: conjunctiva clear; pupils equally round and reactive  ENT: external ears and nose atraumatic; oropharynx clear  CV: no peripheral edema  Resp: normal respiratory effort  Skin: no rashes or wounds; normal turgor  Psych: mood and affect appropriate; recent and remote memory intact    Vitals:    03/18/24 1025   Weight: 71.7 kg (158  "lb)   Height: 165.1 cm (65\")         03/18/24  1025   Weight: 71.7 kg (158 lb)     Body mass index is 26.29 kg/m².      Ortho Exam     Bilateral knees examined  Knee range of motion 0 to 125 degrees  Stable to varus valgus stress at 0 degrees and 30 degrees  1+ anterior Lachman  Moderate swelling bilaterally, mild effusion  Positive active patellar compression test  Positive crepitus throughout arc of motion  Negative medial and lateral Ayla's exam    Assessment:        1. Primary osteoarthritis of knees, bilateral    2. Primary osteoarthritis of right knee           Plan:  - Large Joint Arthrocentesis: bilateral knee on 3/18/2024 10:31 AM  Indications: pain  Details: 22 G needle, superolateral approach  Medications (Right): 80 mg triamcinolone acetonide 40 MG/ML; 8 mL lidocaine PF 1% 1 %  Medications (Left): 80 mg triamcinolone acetonide 40 MG/ML; 8 mL lidocaine PF 1% 1 %  Outcome: tolerated well, no immediate complications  Consent was given by the patient. Immediately prior to procedure a time out was called to verify the correct patient, procedure, equipment, support staff and site/side marked as required. Patient was prepped and draped in the usual sterile fashion.         1.  Discussed plan of care with patient.  We did discuss repeat corticosteroid injections bilateral knees that she is tolerating these well in the past and received significant symptom improvement.  I do recommend application of ice injection site this evening.  Will plan to see her back in clinic as needed.  All questions answered.  Orders:  Orders Placed This Encounter   Procedures    - Large Joint Arthrocentesis: bilateral knee     No orders of the defined types were placed in this encounter.        Jodyon dictation utilized    "

## 2024-03-19 RX ORDER — TRIAMCINOLONE ACETONIDE 40 MG/ML
80 INJECTION, SUSPENSION INTRA-ARTICULAR; INTRAMUSCULAR
Status: COMPLETED | OUTPATIENT
Start: 2024-03-18 | End: 2024-03-18

## 2024-03-19 RX ORDER — LIDOCAINE HYDROCHLORIDE 10 MG/ML
8 INJECTION, SOLUTION EPIDURAL; INFILTRATION; INTRACAUDAL; PERINEURAL
Status: COMPLETED | OUTPATIENT
Start: 2024-03-18 | End: 2024-03-18

## 2024-04-29 ENCOUNTER — TRANSCRIBE ORDERS (OUTPATIENT)
Dept: ADMINISTRATIVE | Facility: HOSPITAL | Age: 84
End: 2024-04-29
Payer: MEDICARE

## 2024-04-29 DIAGNOSIS — N20.0 RENAL CALCULUS: Primary | ICD-10-CM

## 2024-05-15 RX ORDER — AMIODARONE HYDROCHLORIDE 200 MG/1
200 TABLET ORAL DAILY
Qty: 90 TABLET | Refills: 0 | Status: SHIPPED | OUTPATIENT
Start: 2024-05-15

## 2024-06-18 RX ORDER — FUROSEMIDE 40 MG/1
40 TABLET ORAL DAILY
Qty: 30 TABLET | Refills: 1 | Status: SHIPPED | OUTPATIENT
Start: 2024-06-18

## 2024-07-30 ENCOUNTER — OFFICE VISIT (OUTPATIENT)
Dept: ORTHOPEDIC SURGERY | Facility: CLINIC | Age: 84
End: 2024-07-30
Payer: MEDICARE

## 2024-07-30 VITALS — WEIGHT: 158 LBS | BODY MASS INDEX: 26.33 KG/M2 | HEIGHT: 65 IN

## 2024-07-30 DIAGNOSIS — M17.0 PRIMARY OSTEOARTHRITIS OF KNEES, BILATERAL: Primary | ICD-10-CM

## 2024-07-30 DIAGNOSIS — M17.11 PRIMARY OSTEOARTHRITIS OF RIGHT KNEE: ICD-10-CM

## 2024-07-30 RX ORDER — LIDOCAINE HYDROCHLORIDE 10 MG/ML
8 INJECTION, SOLUTION EPIDURAL; INFILTRATION; INTRACAUDAL; PERINEURAL
Status: COMPLETED | OUTPATIENT
Start: 2024-07-30 | End: 2024-07-30

## 2024-07-30 RX ORDER — VIBEGRON 75 MG/1
1 TABLET, FILM COATED ORAL DAILY
COMMUNITY
Start: 2024-07-01

## 2024-07-30 RX ORDER — TRIAMCINOLONE ACETONIDE 40 MG/ML
80 INJECTION, SUSPENSION INTRA-ARTICULAR; INTRAMUSCULAR
Status: COMPLETED | OUTPATIENT
Start: 2024-07-30 | End: 2024-07-30

## 2024-07-30 RX ADMIN — TRIAMCINOLONE ACETONIDE 80 MG: 40 INJECTION, SUSPENSION INTRA-ARTICULAR; INTRAMUSCULAR at 08:07

## 2024-07-30 RX ADMIN — LIDOCAINE HYDROCHLORIDE 8 ML: 10 INJECTION, SOLUTION EPIDURAL; INFILTRATION; INTRACAUDAL; PERINEURAL at 08:07

## 2024-07-30 NOTE — PROGRESS NOTES
- Large Joint Arthrocentesis: bilateral knee on 7/30/2024 8:07 AM  Indications: pain  Details: 22 G needle, anterolateral approach  Medications (Right): 8 mL lidocaine PF 1% 1 %; 80 mg triamcinolone acetonide 40 MG/ML  Medications (Left): 8 mL lidocaine PF 1% 1 %; 80 mg triamcinolone acetonide 40 MG/ML  Outcome: tolerated well, no immediate complications  Procedure, treatment alternatives, risks and benefits explained, specific risks discussed. Consent was given by the patient. Immediately prior to procedure a time out was called to verify the correct patient, procedure, equipment, support staff and site/side marked as required. Patient was prepped and draped in the usual sterile fashion.       Subjective:     Patient ID: Danuta Batista is a 84 y.o. female.    Chief Complaint:  Follow-up DJD bilateral knees  History of Present Illness  History of Present Illness  The patient returns to clinic today for follow-up of bilateral knees.    She is experiencing pain in the front of both knees, with the right knee being more affected than the left. She received corticosteroid injections in March 2024, which led to an improvement in her symptoms. She denies experiencing any locking, catching, or giving way of her knees. She experiences pain during all ambulatory activities and mild symptom relief with rest. She has no other concerns at present.       Social History     Occupational History    Not on file   Tobacco Use    Smoking status: Never     Passive exposure: Never    Smokeless tobacco: Never    Tobacco comments:     caff use   Vaping Use    Vaping status: Never Used   Substance and Sexual Activity    Alcohol use: No    Drug use: No    Sexual activity: Defer      Past Medical History:   Diagnosis Date    Atrial fibrillation     Bladder infection, chronic     Hyperlipidemia     Hypertension     Kidney stone      Past Surgical History:   Procedure Laterality Date    CAROTID ENDARTERECTOMY      left side    DENTAL PROCEDURE    "      Family History   Problem Relation Age of Onset    Stroke Father     Hypertension Sister     Stroke Sister     Hypertension Sister     Cancer Sister     Heart disease Brother 77    Hypertension Brother     Cancer Brother     Breast cancer Neg Hx                Objective:  Physical Exam    General: No acute distress.  Eyes: conjunctiva clear; pupils equally round and reactive  ENT: external ears and nose atraumatic; oropharynx clear  CV: no peripheral edema  Resp: normal respiratory effort  Skin: no rashes or wounds; normal turgor  Psych: mood and affect appropriate; recent and remote memory intact    Vitals:    07/30/24 0806   Weight: 71.7 kg (158 lb)   Height: 165.1 cm (65\")         07/30/24 0806   Weight: 71.7 kg (158 lb)     Body mass index is 26.29 kg/m².      Ortho Exam     Physical Exam  Bilateral knees examined knee range of motion 0 to 125 degrees stable to varus and valgus stress testing at 0 degrees and 30 degrees  1+ anterior Lachman exam  Quad strength 4 out of 5 on the right, 4- out of 5 on the left  Negative medial lateral Ayla's exam  Positive active patellar compression test  Positive crepitus throughout arc of motion  Mild to moderate swelling right knee, negative swelling the left knee      Assessment:        1. Primary osteoarthritis of right knee    2. Primary osteoarthritis of knees, bilateral         Assessment & Plan  1. Bilateral knee pain.  I discussed plan of care with patient. I will proceed with repeat corticosteroid injections in bilateral knees at today's visit. I do recommend injecting the injection sites this evening. I will see her back in clinic 3-month interval to reevaluate and proceed with repeat corticosteroid injections. She is encouraged to call with any questions or concerns. I will gladly see her between now and that time if pain increases. All questions answered.    Follow-up  The patient will follow up in clinic 3 months.    Orders:  Orders Placed This Encounter "   Procedures    - Large Joint Arthrocentesis: bilateral knee     No orders of the defined types were placed in this encounter.          Jodyon dictation utilized          Patient or patient representative verbalized consent for the use of Ambient Listening during the visit with  DEVONTE Hensley for chart documentation. 7/30/2024  14:28 EDT

## 2024-08-14 RX ORDER — FUROSEMIDE 40 MG/1
40 TABLET ORAL DAILY
Qty: 30 TABLET | Refills: 1 | Status: SHIPPED | OUTPATIENT
Start: 2024-08-14

## 2024-08-14 RX ORDER — AMIODARONE HYDROCHLORIDE 200 MG/1
200 TABLET ORAL DAILY
Qty: 90 TABLET | Refills: 0 | Status: SHIPPED | OUTPATIENT
Start: 2024-08-14

## 2024-08-27 ENCOUNTER — HOSPITAL ENCOUNTER (OUTPATIENT)
Dept: GENERAL RADIOLOGY | Facility: HOSPITAL | Age: 84
Discharge: HOME | End: 2024-08-27
Payer: MEDICARE

## 2024-08-27 ENCOUNTER — LAB (OUTPATIENT)
Dept: LAB | Facility: HOSPITAL | Age: 84
End: 2024-08-27
Payer: MEDICARE

## 2024-08-27 DIAGNOSIS — R07.89 CHEST DISCOMFORT: ICD-10-CM

## 2024-08-27 DIAGNOSIS — R93.89 ABNORMAL FINDINGS ON DIAGNOSTIC IMAGING OF OTHER SPECIFIED BODY STRUCTURES: ICD-10-CM

## 2024-08-27 LAB
ALBUMIN SERPL-MCNC: 4.3 G/DL (ref 3.5–5.2)
ALBUMIN/GLOB SERPL: 1.5 G/DL
ALP SERPL-CCNC: 92 U/L (ref 39–117)
ALT SERPL W P-5'-P-CCNC: 24 U/L (ref 1–33)
ANION GAP SERPL CALCULATED.3IONS-SCNC: 11.3 MMOL/L (ref 5–15)
AST SERPL-CCNC: 17 U/L (ref 1–32)
BILIRUB SERPL-MCNC: 0.5 MG/DL (ref 0–1.2)
BUN SERPL-MCNC: 21 MG/DL (ref 8–23)
BUN/CREAT SERPL: 16.7 (ref 7–25)
CALCIUM SPEC-SCNC: 10.6 MG/DL (ref 8.6–10.5)
CHLORIDE SERPL-SCNC: 103 MMOL/L (ref 98–107)
CO2 SERPL-SCNC: 25.7 MMOL/L (ref 22–29)
CREAT SERPL-MCNC: 1.26 MG/DL (ref 0.57–1)
EGFRCR SERPLBLD CKD-EPI 2021: 42.2 ML/MIN/1.73
GLOBULIN UR ELPH-MCNC: 2.9 GM/DL
GLUCOSE SERPL-MCNC: 139 MG/DL (ref 65–99)
POTASSIUM SERPL-SCNC: 4.2 MMOL/L (ref 3.5–5.2)
PROT SERPL-MCNC: 7.2 G/DL (ref 6–8.5)
SODIUM SERPL-SCNC: 140 MMOL/L (ref 136–145)
T4 FREE SERPL-MCNC: 1.4 NG/DL (ref 0.92–1.68)
TSH SERPL DL<=0.05 MIU/L-ACNC: 1.62 UIU/ML (ref 0.27–4.2)

## 2024-08-27 PROCEDURE — 84439 ASSAY OF FREE THYROXINE: CPT

## 2024-08-27 PROCEDURE — 71046 X-RAY EXAM CHEST 2 VIEWS: CPT

## 2024-08-27 PROCEDURE — 84443 ASSAY THYROID STIM HORMONE: CPT

## 2024-08-27 PROCEDURE — 36415 COLL VENOUS BLD VENIPUNCTURE: CPT

## 2024-08-27 PROCEDURE — 80151 DRUG ASSAY AMIODARONE: CPT

## 2024-08-27 PROCEDURE — 80053 COMPREHEN METABOLIC PANEL: CPT

## 2024-09-02 LAB
AMIODARONE SERPL-MCNC: 1757 NG/ML (ref 1000–2500)
DESETHYLAMIODARONE SERPL-MCNC: 760 NG/ML

## 2024-09-10 ENCOUNTER — OFFICE VISIT (OUTPATIENT)
Age: 84
End: 2024-09-10
Payer: MEDICARE

## 2024-09-10 VITALS
DIASTOLIC BLOOD PRESSURE: 80 MMHG | HEART RATE: 74 BPM | SYSTOLIC BLOOD PRESSURE: 152 MMHG | HEIGHT: 65 IN | BODY MASS INDEX: 24.99 KG/M2 | WEIGHT: 150 LBS

## 2024-09-10 DIAGNOSIS — Z79.899 LONG TERM CURRENT USE OF AMIODARONE: ICD-10-CM

## 2024-09-10 DIAGNOSIS — I10 ESSENTIAL HYPERTENSION: ICD-10-CM

## 2024-09-10 DIAGNOSIS — I65.29 STENOSIS OF CAROTID ARTERY, UNSPECIFIED LATERALITY: ICD-10-CM

## 2024-09-10 DIAGNOSIS — I48.19 ATRIAL FIBRILLATION, PERSISTENT: Primary | ICD-10-CM

## 2024-09-10 PROBLEM — I48.91 ATRIAL FIBRILLATION WITH RVR: Status: RESOLVED | Noted: 2023-06-27 | Resolved: 2024-09-10

## 2024-09-10 PROBLEM — I48.91 NEW ONSET ATRIAL FIBRILLATION: Status: RESOLVED | Noted: 2023-06-26 | Resolved: 2024-09-10

## 2024-09-10 NOTE — PROGRESS NOTES
"      ELECTROPHYSIOLOGY   Date of Office Visit: 09/10/2024  Patient Name: Danuta Batista  : 1940  Encounter Provider: Hemal Lozada PA-C  Primary Cardiologist: Kris Davis MD  Electrophysiologist: Dr. Mayorga  CHIEF COMPLAINT / REASON FOR OFFICE VISIT     Atrial Fibrillation (6 month f/u)  Amiodarone follow up     HISTORY OF PRESENT ILLNESS     This is a 84 y.o. year old female who presents to [facility] CARDIOLOGY for a for a 6 month follow up.     She went to the urgent care in 2023 with complaint of dyspnea and LE edema. She was seen to be in newly dx AF with RVR.     She had a subsequent DAISY/CV on 2023. The next day, she relapsed back into atrial fibrillation.     She was loaded with amiodarone and has now been taking amiodarone 200 mg daily since. She had recent CXR, TSH/T4, and LFT testing done that was normal.     Today the patient reports overall her energy levels are the same as they were 6 months ago. She has some fatigue but is still living at home alone and uses a cane for ambulation. Her activity seems mostly limited by knee pain for which she gets injections.     She does admit to some easy bruising.     Her daughter accompanied her for today's visit and noted she has not had any change in breathing or more LE edema.     On apixaban for AC. Denies bleeding complications    Per her family, she has been on plavix since stenting to her carotids in 2018.     PMHx:  Persistent AF, Carotid artery stenosis with prior stenting in 2018, HTN, HL, OA of bilateral knees, chronic bladder infections     PHYSICAL EXAMINATION     Vital Signs:  /80   Pulse 74   Ht 165.1 cm (65\")   Wt 68 kg (150 lb)   BMI 24.96 kg/m²   Estimated body mass index is 24.96 kg/m² as calculated from the following:    Height as of this encounter: 165.1 cm (65\").    Weight as of this encounter: 68 kg (150 lb).       BMI is within normal parameters. No other follow-up for BMI " required.      Physical Exam  Constitutional:       Appearance: Normal appearance.   HENT:      Head: Normocephalic and atraumatic.   Cardiovascular:      Rate and Rhythm: Normal rate. Rhythm irregular.      Pulses: Normal pulses.      Heart sounds: Normal heart sounds.   Pulmonary:      Effort: Pulmonary effort is normal.      Breath sounds: Normal breath sounds.   Musculoskeletal:      Right lower leg: No edema.      Left lower leg: No edema.   Skin:     General: Skin is warm and dry.   Neurological:      General: No focal deficit present.      Mental Status: She is alert and oriented to person, place, and time.          Cardiac Testing/Results     Cardiac Testing:   - DAISY 6/2023: EF 70% with indeterminate diastolic function. Mildly dilated LA. RVSP < 35 mmHg.     Result Review :  The following data was reviewed by: Hemal Lozada PA-C on 09/10/2024:    Lipid Panel          3/4/2024    10:59   Lipid Panel   Total Cholesterol 177    Triglycerides 338    HDL Cholesterol 29    VLDL Cholesterol 56    LDL Cholesterol  92    LDL/HDL Ratio 2.77       Lab Results   Component Value Date     08/27/2024     02/06/2024    K 4.2 08/27/2024    K 3.8 02/06/2024     08/27/2024     02/06/2024    CO2 25.7 08/27/2024    CO2 28.4 02/06/2024    BUN 21 08/27/2024    BUN 20 02/06/2024    CREATININE 1.26 (H) 08/27/2024    CREATININE 1.23 (H) 02/06/2024    EGFRIFNONA 54 (L) 12/27/2021    EGFRIFNONA 52 (L) 12/02/2021    GLUCOSE 139 (H) 08/27/2024    GLUCOSE 136 (H) 02/06/2024    CALCIUM 10.6 (H) 08/27/2024    CALCIUM 10.5 02/06/2024    ALBUMIN 4.3 08/27/2024    ALBUMIN 4.8 02/06/2024    AST 17 08/27/2024    AST 18 02/06/2024    ALT 24 08/27/2024    ALT 18 02/06/2024     Lab Results   Component Value Date    WBC 6.40 03/04/2024    WBC 9.47 09/14/2023    HGB 14.1 03/04/2024    HGB 16.1 (H) 09/14/2023    HCT 43.5 03/04/2024    HCT 47.4 (H) 09/14/2023    MCV 88.4 03/04/2024    MCV 92.0 09/14/2023      03/04/2024     09/14/2023     Lab Results   Component Value Date    PROBNP 1,705.0 06/26/2023     Lab Results   Component Value Date    TROPONINT 99 (C) 06/27/2023     Lab Results   Component Value Date    TSH 1.620 08/27/2024    TSH 1.910 02/06/2024                 ECG 12 Lead    Date/Time: 9/10/2024 11:29 AM  Performed by: Hemal Lakhain PA-C    Authorized by: Hemal Lakhani PA-C  Comparison: compared with previous ECG from 2/20/2024  Rhythm: sinus rhythm  Rate: normal  BPM: 74  ST Segments: ST segments normal  T Waves: T waves normal  QRS axis: left    Clinical impression: normal ECG                ASSESSMENT & PLAN       Diagnoses and all orders for this visit:    1. Atrial fibrillation, persistent (Primary)  In SR today on EKG. She has not had any perceived side effects to amiodarone, she had in the past had AF with RVR that resulted in CHF exacerbation  Continue amiodarone 200 mg daily, recent TSH/T4, LFT and CXR was normal. Will plan to repeat these tests in 6 months prior to next visit. She gets her testing done at [place]. Amio level was stable  Due to her age and low renal function will reduce the dose of eliquis to 2.5 mg twice daily  Continue metoprolol   2. Long term current use of amiodarone  She has been on it over over 1 year, tolerating it well  3. Essential hypertension  BP is up and down at home but more concerned about having low readings with her age and mobility issues  Continue current meds with her clonidine, amlodipine, metoprolol and valsartan  She does monitor this at home   4. Stenosis of carotid artery, unspecified laterality  Hx of prior stenting in 2018, has been on plavix since. If ongoing bruising issues consider downgrading at next visit to only ASA  On statin         Follow Up:  Return in about 6 months (around 3/10/2025) for Dr. Mayorga-Routine.  Patient was given instructions and counseling regarding her condition or for health maintenance advice. Please  contact office if worsening symptoms or proceed to ER when appropriate.      Hemal Lozada PA-C  09/10/24  11:55 EDT    MEDICATIONS         Discharge Medications            Accurate as of September 10, 2024 11:55 AM. If you have any questions, ask your nurse or doctor.                Changes to Medications        Instructions Start Date   apixaban 2.5 MG tablet tablet  Commonly known as: ELIQUIS  What changed:   medication strength  how much to take  Changed by: Hmeal Lozada   2.5 mg, Oral, Every 12 Hours Scheduled             Continue These Medications        Instructions Start Date   amiodarone 200 MG tablet  Commonly known as: PACERONE   200 mg, Oral, Daily      amLODIPine 10 MG tablet  Commonly known as: NORVASC   10 mg, Oral, Daily      atorvastatin 10 MG tablet  Commonly known as: LIPITOR   10 mg, Oral, Daily      cloNIDine 0.2 MG tablet  Commonly known as: CATAPRES   0.2 mg, Oral, 2 Times Daily      clopidogrel 75 MG tablet  Commonly known as: PLAVIX   75 mg, Oral, Daily      furosemide 40 MG tablet  Commonly known as: LASIX   40 mg, Oral, Daily      Gemtesa 75 MG tablet  Generic drug: Vibegron   1 tablet, Oral, Daily      metFORMIN 500 MG tablet  Commonly known as: GLUCOPHAGE   500 mg, Oral, 2 Times Daily With Meals      metoprolol succinate XL 25 MG 24 hr tablet  Commonly known as: TOPROL-XL   25 mg, Oral, Daily      MULTI-COOKIE PO   1 tablet, Oral, Daily      sodium bicarbonate 650 MG tablet   650 mg, Oral, 3 Times Daily      valsartan 320 MG tablet  Commonly known as: DIOVAN   320 mg, Oral, Daily                   **Dragon Disclaimer: This note was dictated using an electronic transcription. The electronic translation of spoken language may permit erroneous, or at times, nonsensical words or phrases to be inadvertently transcribed. Although I have reviewed the note for such errors, some may still exist.

## 2024-09-10 NOTE — PATIENT INSTRUCTIONS
Keep Blood pressure log with goal of -150 and DBP  60-90, contact office if multiple readings out of range    Call office if high heart rates at home, if over 120 when checking it     Limit salt to < 3 mg daily per AHA guidelines

## 2024-09-17 ENCOUNTER — TELEPHONE (OUTPATIENT)
Dept: CARDIOLOGY | Facility: CLINIC | Age: 84
End: 2024-09-17
Payer: MEDICARE

## 2024-09-17 DIAGNOSIS — I48.19 ATRIAL FIBRILLATION, PERSISTENT: ICD-10-CM

## 2024-09-17 DIAGNOSIS — I48.19 ATRIAL FIBRILLATION, PERSISTENT: Primary | ICD-10-CM

## 2024-09-17 RX ORDER — FUROSEMIDE 40 MG
40 TABLET ORAL DAILY
Qty: 30 TABLET | Refills: 1 | Status: SHIPPED | OUTPATIENT
Start: 2024-09-17 | End: 2024-09-17

## 2024-09-17 RX ORDER — FUROSEMIDE 40 MG
40 TABLET ORAL DAILY
Qty: 90 TABLET | Refills: 3 | Status: SHIPPED | OUTPATIENT
Start: 2024-09-17

## 2024-10-21 ENCOUNTER — TRANSCRIBE ORDERS (OUTPATIENT)
Dept: ADMINISTRATIVE | Facility: HOSPITAL | Age: 84
End: 2024-10-21
Payer: MEDICARE

## 2024-10-21 DIAGNOSIS — I65.23 CAROTID STENOSIS, BILATERAL: Primary | ICD-10-CM

## 2024-10-24 ENCOUNTER — LAB (OUTPATIENT)
Dept: LAB | Facility: HOSPITAL | Age: 84
End: 2024-10-24
Payer: MEDICARE

## 2024-10-24 ENCOUNTER — TRANSCRIBE ORDERS (OUTPATIENT)
Dept: ADMINISTRATIVE | Facility: HOSPITAL | Age: 84
End: 2024-10-24
Payer: MEDICARE

## 2024-10-24 DIAGNOSIS — D69.6 THROMBOCYTOPENIA, UNSPECIFIED: ICD-10-CM

## 2024-10-24 DIAGNOSIS — E55.9 AVITAMINOSIS D: ICD-10-CM

## 2024-10-24 DIAGNOSIS — E11.8 CONTROLLED DIABETES MELLITUS TYPE 2 WITH COMPLICATIONS, UNSPECIFIED WHETHER LONG TERM INSULIN USE: ICD-10-CM

## 2024-10-24 DIAGNOSIS — E78.5 HYPERLIPIDEMIA, UNSPECIFIED HYPERLIPIDEMIA TYPE: ICD-10-CM

## 2024-10-24 DIAGNOSIS — I10 ESSENTIAL HYPERTENSION, MALIGNANT: ICD-10-CM

## 2024-10-24 DIAGNOSIS — I10 ESSENTIAL HYPERTENSION, MALIGNANT: Primary | ICD-10-CM

## 2024-10-24 LAB
ALBUMIN SERPL-MCNC: 4.3 G/DL (ref 3.5–5.2)
ALBUMIN/GLOB SERPL: 1.3 G/DL
ALP SERPL-CCNC: 103 U/L (ref 39–117)
ALT SERPL W P-5'-P-CCNC: 17 U/L (ref 1–33)
ANION GAP SERPL CALCULATED.3IONS-SCNC: 14.9 MMOL/L (ref 5–15)
AST SERPL-CCNC: 19 U/L (ref 1–32)
BASOPHILS # BLD AUTO: 0.1 10*3/MM3 (ref 0–0.2)
BASOPHILS NFR BLD AUTO: 1.4 % (ref 0–1.5)
BILIRUB SERPL-MCNC: 0.6 MG/DL (ref 0–1.2)
BUN SERPL-MCNC: 21 MG/DL (ref 8–23)
BUN/CREAT SERPL: 16.8 (ref 7–25)
CALCIUM SPEC-SCNC: 10.7 MG/DL (ref 8.6–10.5)
CHLORIDE SERPL-SCNC: 102 MMOL/L (ref 98–107)
CHOLEST SERPL-MCNC: 177 MG/DL (ref 0–200)
CO2 SERPL-SCNC: 23.1 MMOL/L (ref 22–29)
CREAT SERPL-MCNC: 1.25 MG/DL (ref 0.57–1)
DEPRECATED RDW RBC AUTO: 47.7 FL (ref 37–54)
EGFRCR SERPLBLD CKD-EPI 2021: 42.6 ML/MIN/1.73
EOSINOPHIL # BLD AUTO: 0.24 10*3/MM3 (ref 0–0.4)
EOSINOPHIL NFR BLD AUTO: 3.4 % (ref 0.3–6.2)
ERYTHROCYTE [DISTWIDTH] IN BLOOD BY AUTOMATED COUNT: 14.3 % (ref 12.3–15.4)
GLOBULIN UR ELPH-MCNC: 3.3 GM/DL
GLUCOSE SERPL-MCNC: 129 MG/DL (ref 65–99)
HBA1C MFR BLD: 6.7 % (ref 4.8–5.6)
HCT VFR BLD AUTO: 43.6 % (ref 34–46.6)
HDLC SERPL-MCNC: 30 MG/DL (ref 40–60)
HGB BLD-MCNC: 14.4 G/DL (ref 12–15.9)
IMM GRANULOCYTES # BLD AUTO: 0.04 10*3/MM3 (ref 0–0.05)
IMM GRANULOCYTES NFR BLD AUTO: 0.6 % (ref 0–0.5)
LDLC SERPL CALC-MCNC: 98 MG/DL (ref 0–100)
LDLC/HDLC SERPL: 2.98 {RATIO}
LYMPHOCYTES # BLD AUTO: 1.73 10*3/MM3 (ref 0.7–3.1)
LYMPHOCYTES NFR BLD AUTO: 24.2 % (ref 19.6–45.3)
MCH RBC QN AUTO: 30.1 PG (ref 26.6–33)
MCHC RBC AUTO-ENTMCNC: 33 G/DL (ref 31.5–35.7)
MCV RBC AUTO: 91.2 FL (ref 79–97)
MONOCYTES # BLD AUTO: 0.6 10*3/MM3 (ref 0.1–0.9)
MONOCYTES NFR BLD AUTO: 8.4 % (ref 5–12)
NEUTROPHILS NFR BLD AUTO: 4.44 10*3/MM3 (ref 1.7–7)
NEUTROPHILS NFR BLD AUTO: 62 % (ref 42.7–76)
NRBC BLD AUTO-RTO: 0 /100 WBC (ref 0–0.2)
PLATELET # BLD AUTO: 191 10*3/MM3 (ref 140–450)
PMV BLD AUTO: 11.3 FL (ref 6–12)
POTASSIUM SERPL-SCNC: 4.4 MMOL/L (ref 3.5–5.2)
PROT SERPL-MCNC: 7.6 G/DL (ref 6–8.5)
RBC # BLD AUTO: 4.78 10*6/MM3 (ref 3.77–5.28)
SODIUM SERPL-SCNC: 140 MMOL/L (ref 136–145)
TRIGL SERPL-MCNC: 288 MG/DL (ref 0–150)
VLDLC SERPL-MCNC: 49 MG/DL (ref 5–40)
WBC NRBC COR # BLD AUTO: 7.15 10*3/MM3 (ref 3.4–10.8)

## 2024-10-24 PROCEDURE — 36415 COLL VENOUS BLD VENIPUNCTURE: CPT

## 2024-10-24 PROCEDURE — 83036 HEMOGLOBIN GLYCOSYLATED A1C: CPT

## 2024-10-24 PROCEDURE — 85025 COMPLETE CBC W/AUTO DIFF WBC: CPT

## 2024-10-24 PROCEDURE — 80053 COMPREHEN METABOLIC PANEL: CPT

## 2024-10-24 PROCEDURE — 82306 VITAMIN D 25 HYDROXY: CPT

## 2024-10-24 PROCEDURE — 80061 LIPID PANEL: CPT

## 2024-10-25 LAB — 25(OH)D3 SERPL-MCNC: 29.4 NG/ML (ref 30–100)

## 2024-10-28 ENCOUNTER — HOSPITAL ENCOUNTER (OUTPATIENT)
Dept: ULTRASOUND IMAGING | Facility: HOSPITAL | Age: 84
Discharge: HOME | End: 2024-10-28
Admitting: NURSE PRACTITIONER
Payer: MEDICARE

## 2024-10-28 DIAGNOSIS — I65.23 CAROTID STENOSIS, BILATERAL: ICD-10-CM

## 2024-10-28 PROCEDURE — 93880 EXTRACRANIAL BILAT STUDY: CPT

## 2024-10-30 ENCOUNTER — OFFICE VISIT (OUTPATIENT)
Dept: ORTHOPEDIC SURGERY | Facility: CLINIC | Age: 84
End: 2024-10-30
Payer: MEDICARE

## 2024-10-30 VITALS — HEIGHT: 65 IN | BODY MASS INDEX: 24.99 KG/M2 | WEIGHT: 150 LBS

## 2024-10-30 DIAGNOSIS — M17.0 PRIMARY OSTEOARTHRITIS OF KNEES, BILATERAL: Primary | ICD-10-CM

## 2024-10-30 RX ORDER — TRIAMCINOLONE ACETONIDE 40 MG/ML
80 INJECTION, SUSPENSION INTRA-ARTICULAR; INTRAMUSCULAR
Status: COMPLETED | OUTPATIENT
Start: 2024-10-30 | End: 2024-10-30

## 2024-10-30 RX ORDER — LIDOCAINE HYDROCHLORIDE 10 MG/ML
8 INJECTION, SOLUTION EPIDURAL; INFILTRATION; INTRACAUDAL; PERINEURAL
Status: COMPLETED | OUTPATIENT
Start: 2024-10-30 | End: 2024-10-30

## 2024-10-30 RX ADMIN — LIDOCAINE HYDROCHLORIDE 8 ML: 10 INJECTION, SOLUTION EPIDURAL; INFILTRATION; INTRACAUDAL; PERINEURAL at 08:11

## 2024-10-30 RX ADMIN — TRIAMCINOLONE ACETONIDE 80 MG: 40 INJECTION, SUSPENSION INTRA-ARTICULAR; INTRAMUSCULAR at 08:11

## 2024-10-30 NOTE — PROGRESS NOTES
Subjective:     Patient ID: Danuta Batista is a 84 y.o. female.    Chief Complaint:  Follow-up DJD bilateral knees  History of Present Illness  History of Present Illness    Danuta Batista presents to clinic today for reevaluation bilateral knees.  Received corticosteroid injection bilaterally 7/30/2024 with significant symptom improvement specifically the left knee improvement with the right knee up into the last 1 week.  Pain has returned she is experiencing pain with extension and flexion.  She does report increased activity recently which is exacerbated her symptoms.  She is experiencing pain with acing descending stairs, ambulating long distances.  She is ambulating outside of home with assistive device however when she is ambulating inside home she is not utilizing any assistive devices.  She is happy with the symptom improvement with the steroid injections.  Denies any other concerns present.       Social History     Occupational History    Not on file   Tobacco Use    Smoking status: Never     Passive exposure: Never    Smokeless tobacco: Never    Tobacco comments:     caff use   Vaping Use    Vaping status: Never Used   Substance and Sexual Activity    Alcohol use: No    Drug use: No    Sexual activity: Defer      Past Medical History:   Diagnosis Date    Atrial fibrillation     Bladder infection, chronic     Hyperlipidemia     Hypertension     Kidney stone      Past Surgical History:   Procedure Laterality Date    CAROTID ENDARTERECTOMY      left side    DENTAL PROCEDURE         Family History   Problem Relation Age of Onset    Stroke Father     Hypertension Sister     Stroke Sister     Hypertension Sister     Cancer Sister     Heart disease Brother 77    Hypertension Brother     Cancer Brother     Breast cancer Neg Hx                Objective:  Physical Exam    Vital signs reviewed.   General: No acute distress.  Eyes: conjunctiva clear; pupils equally round and reactive  ENT: external ears and nose atraumatic;  "oropharynx clear  CV: no peripheral edema  Resp: normal respiratory effort  Skin: no rashes or wounds; normal turgor  Psych: mood and affect appropriate; recent and remote memory intact    Vitals:    10/30/24 0808   Height: 165.1 cm (65\")     There were no vitals filed for this visit.  Body mass index is 24.96 kg/m².      Right Knee Exam     Tenderness   The patient is experiencing tenderness in the medial joint line and patella.    Range of Motion   Right knee extension: 7.   Right knee flexion: 120.     Tests   Ayla:  Medial - positive Lateral - negative  Varus: negative Valgus: negative  Lachman:  Anterior - 1+      Patellar apprehension: positive    Other   Erythema: absent  Sensation: normal  Pulse: present  Swelling: severe  Effusion: no effusion present      Left Knee Exam     Tenderness   The patient is experiencing tenderness in the medial joint line and patella.    Range of Motion   Left knee extension: 7.   Left knee flexion: 120.     Tests   Ayla:  Medial - positive Lateral - negative  Varus: negative Valgus: negative  Lachman:  Anterior - 1+      Patellar apprehension: positive    Other   Erythema: absent  Sensation: normal  Pulse: present  Swelling: severe  Effusion: no effusion present             Physical Exam      Imaging:  Bilateral Knee X-Ray  Indication: Pain    AP, Lateral, and [place] views    Findings:  No fracture  No bony lesion  Normal soft tissues  Normal joint spaces  Advanced medial compartment narrowing with bone-on-bone articulation bilaterally advanced patellofemoral degeneration with reactive osteophytes in all 3 compartments  prior studies were available for comparison.    Assessment:        1. Primary osteoarthritis of knees, bilateral         Assessment & Plan      Plan:  1.  Discussed plan of care with patient.  Long discussion with the patient regarding treatment options including corticosteroid injections versus surgical intervention.  At this time she would like to " hold off on any surgical intervention we will proceed with repeat corticosteroid injections as this is given her significant symptom improvement previously.  I do recommend application of ice injection site this evening.  May take 3 to 7 days before she begins to experience any significant symptom improvement after the injections.  I will see her back in clinic in a 3-month interval.  I do recommend she work on extension of the knees as well as ambulatory activity around her home.  All questions answered.  - Large Joint Arthrocentesis: bilateral knee on 10/30/2024 8:11 AM  Indications: pain  Details: 22 G needle, anterolateral approach  Medications (Right): 8 mL lidocaine PF 1% 1 %; 80 mg triamcinolone acetonide 40 MG/ML  Medications (Left): 8 mL lidocaine PF 1% 1 %; 80 mg triamcinolone acetonide 40 MG/ML  Outcome: tolerated well, no immediate complications  Procedure, treatment alternatives, risks and benefits explained, specific risks discussed. Consent was given by the patient. Immediately prior to procedure a time out was called to verify the correct patient, procedure, equipment, support staff and site/side marked as required. Patient was prepped and draped in the usual sterile fashion.         Orders:  Orders Placed This Encounter   Procedures    XR Knee 3 View Bilateral     No orders of the defined types were placed in this encounter.        Dragon dictation utilized  BMI is within normal parameters. No other follow-up for BMI required.

## 2024-11-11 RX ORDER — AMIODARONE HYDROCHLORIDE 200 MG/1
200 TABLET ORAL DAILY
Qty: 90 TABLET | Refills: 1 | Status: SHIPPED | OUTPATIENT
Start: 2024-11-11

## 2025-01-30 ENCOUNTER — OFFICE VISIT (OUTPATIENT)
Dept: ORTHOPEDIC SURGERY | Facility: CLINIC | Age: 85
End: 2025-01-30
Payer: MEDICARE

## 2025-01-30 VITALS — BODY MASS INDEX: 24.99 KG/M2 | WEIGHT: 150 LBS | HEIGHT: 65 IN

## 2025-01-30 DIAGNOSIS — M17.0 PRIMARY OSTEOARTHRITIS OF KNEES, BILATERAL: Primary | ICD-10-CM

## 2025-01-30 RX ADMIN — TRIAMCINOLONE ACETONIDE 80 MG: 40 INJECTION, SUSPENSION INTRA-ARTICULAR; INTRAMUSCULAR at 10:47

## 2025-01-30 RX ADMIN — LIDOCAINE HYDROCHLORIDE 8 ML: 10 INJECTION, SOLUTION EPIDURAL; INFILTRATION; INTRACAUDAL; PERINEURAL at 10:47

## 2025-01-30 NOTE — PROGRESS NOTES
Subjective:     Patient ID: Danuta Batista is a 84 y.o. female.    Chief Complaint:  Follow-up DJD bilateral knees  History of Present Illness  History of Present Illness  The patient is an 84-year-old female who presents to the clinic today for reevaluation of bilateral knee pain.    She has done quite well with the left knee. The visco injection seemed to provide her significant symptom improvement previously, and she is experiencing pain greater at the right knee than she has the left. She experiences pain with transitional activities such as moving from seated to standing and attempting to walk. Total knee arthroplasty was discussed previously. Our preference is that she return either home with home health PT or home with outpatient PT versus short-term rehab as she will progress significantly with the therapy. She wishes to hold off on any total knee arthroplasty and has done quite well with the steroid injections in the past. She reports no other concerns at present.       Social History     Occupational History    Not on file   Tobacco Use    Smoking status: Never     Passive exposure: Never    Smokeless tobacco: Never    Tobacco comments:     caff use   Vaping Use    Vaping status: Never Used   Substance and Sexual Activity    Alcohol use: No    Drug use: No    Sexual activity: Defer      Past Medical History:   Diagnosis Date    Atrial fibrillation     Bladder infection, chronic     Hyperlipidemia     Hypertension     Kidney stone      Past Surgical History:   Procedure Laterality Date    CAROTID ENDARTERECTOMY      left side    DENTAL PROCEDURE         Family History   Problem Relation Age of Onset    Stroke Father     Hypertension Sister     Stroke Sister     Hypertension Sister     Cancer Sister     Heart disease Brother 77    Hypertension Brother     Cancer Brother     Breast cancer Neg Hx                Objective:  Physical Exam    Vital signs reviewed.   General: No acute distress.  Eyes: conjunctiva  "clear; pupils equally round and reactive  ENT: external ears and nose atraumatic; oropharynx clear  CV: no peripheral edema  Resp: normal respiratory effort  Skin: no rashes or wounds; normal turgor  Psych: mood and affect appropriate; recent and remote memory intact    Vitals:    01/30/25 1042   Weight: 68 kg (150 lb)   Height: 165.1 cm (65\")         01/30/25  1042   Weight: 68 kg (150 lb)     Body mass index is 24.96 kg/m².      Ortho Exam     Physical Exam  Bilateral knees examined  Right knee moderate to severe swelling, left knee mild swelling  Knee range of motion 0 to 120 degrees  Stable varus valgus stress at 0 degrees and 30 degrees  Positive active patellar compression test  Positive crepitus or arc of motion  Quad strength 4- out of 5  1+ anterior Lachman exam  Negative anterior posterior drawer exam      Assessment:        1. Primary osteoarthritis of knees, bilateral         Assessment & Plan  1. Bilateral knee pain.  She reports significant pain in the right knee, particularly with transitional activities such as moving from seated to standing and attempting to walk. The left knee has shown improvement with previous visco injections. A comprehensive discussion regarding the plan of care was conducted. She expressed a desire to proceed with repeat corticosteroid injections during today's visit and prefers to postpone any consideration of total knee arthroplasty at this time. All her queries were addressed satisfactorily. She is encouraged to contact the clinic with any questions or concerns.    Follow-up  The patient will follow up in the clinic in 3 months.    Plan:  - Large Joint Arthrocentesis: bilateral knee on 1/30/2025 10:47 AM  Indications: pain  Details: 22 G needle, anteromedial approach  Medications (Right): 8 mL lidocaine PF 1% 1 %; 80 mg triamcinolone acetonide 40 MG/ML  Medications (Left): 8 mL lidocaine PF 1% 1 %; 80 mg triamcinolone acetonide 40 MG/ML  Outcome: tolerated well, no " immediate complications  Procedure, treatment alternatives, risks and benefits explained, specific risks discussed. Consent was given by the patient. Immediately prior to procedure a time out was called to verify the correct patient, procedure, equipment, support staff and site/side marked as required. Patient was prepped and draped in the usual sterile fashion.         Orders:  Orders Placed This Encounter   Procedures    - Large Joint Arthrocentesis: bilateral knee     No orders of the defined types were placed in this encounter.      Dragon dictation utilized  BMI is within normal parameters. No other follow-up for BMI required.       Patient or patient representative verbalized consent for the use of Ambient Listening during the visit with  DEVONTE Hensley for chart documentation. 1/31/2025  17:37 EST

## 2025-01-31 RX ORDER — TRIAMCINOLONE ACETONIDE 40 MG/ML
80 INJECTION, SUSPENSION INTRA-ARTICULAR; INTRAMUSCULAR
Status: COMPLETED | OUTPATIENT
Start: 2025-01-30 | End: 2025-01-30

## 2025-01-31 RX ORDER — LIDOCAINE HYDROCHLORIDE 10 MG/ML
8 INJECTION, SOLUTION EPIDURAL; INFILTRATION; INTRACAUDAL; PERINEURAL
Status: COMPLETED | OUTPATIENT
Start: 2025-01-30 | End: 2025-01-30

## 2025-02-19 ENCOUNTER — TELEPHONE (OUTPATIENT)
Dept: CARDIOLOGY | Facility: CLINIC | Age: 85
End: 2025-02-19
Payer: MEDICARE

## 2025-02-19 DIAGNOSIS — I48.19 ATRIAL FIBRILLATION, PERSISTENT: Primary | ICD-10-CM

## 2025-02-19 NOTE — TELEPHONE ENCOUNTER
Please place lab orders if pt needs labs done prior to appt. CXR Order in in chart still valid.    GARRET Samayoa

## 2025-02-19 NOTE — TELEPHONE ENCOUNTER
Hub staff attempted to follow warm transfer process and was unsuccessful     Caller: Danuta Batista    Relationship to patient: Self    Best call back number:[phone]     Patient is needing PATIENT STATED THAT SHE IS SUPPOSE TO HAVE LABS DONE ON 02.24.25 AT [facility] AND SHE ALSO IS SUPPOSE TO HAVE XR CHEST 2 VIEW DONE. PATIENT IS UNABLE TO COME ON 02.24.25. PATIENT NEEDS SOMEONE TO CALL HER TO LET HER KNOW IF SHE NEEDS AN APPOINTMENT FOR LABS AND SCHEDULE XR CHEST 2 VIEW.PATIENT HAS AN APPOINTMENT WITH DR. ROMAN ON 03.18.25 AND NEEDS THE LABS AND XR CHEST 2 VIEW DONE BEFORE THIS APPOINTMENT.  PLEASE CONTACT PATIENT TO ADVISE AND DISCUSS. THANK YOU.

## 2025-02-26 ENCOUNTER — LAB (OUTPATIENT)
Dept: LAB | Facility: HOSPITAL | Age: 85
End: 2025-02-26
Payer: MEDICARE

## 2025-02-26 ENCOUNTER — HOSPITAL ENCOUNTER (OUTPATIENT)
Dept: GENERAL RADIOLOGY | Facility: HOSPITAL | Age: 85
Discharge: HOME | End: 2025-02-26
Payer: MEDICARE

## 2025-02-26 DIAGNOSIS — Z79.899 LONG TERM CURRENT USE OF AMIODARONE: ICD-10-CM

## 2025-02-26 DIAGNOSIS — I48.19 ATRIAL FIBRILLATION, PERSISTENT: ICD-10-CM

## 2025-02-26 LAB
ALBUMIN SERPL-MCNC: 4.1 G/DL (ref 3.5–5.2)
ALBUMIN/GLOB SERPL: 1.3 G/DL
ALP SERPL-CCNC: 127 U/L (ref 39–117)
ALT SERPL W P-5'-P-CCNC: 39 U/L (ref 1–33)
ANION GAP SERPL CALCULATED.3IONS-SCNC: 12 MMOL/L (ref 5–15)
AST SERPL-CCNC: 25 U/L (ref 1–32)
BILIRUB SERPL-MCNC: 0.4 MG/DL (ref 0–1.2)
BUN SERPL-MCNC: 20 MG/DL (ref 8–23)
BUN/CREAT SERPL: 19 (ref 7–25)
CALCIUM SPEC-SCNC: 10.5 MG/DL (ref 8.6–10.5)
CHLORIDE SERPL-SCNC: 103 MMOL/L (ref 98–107)
CO2 SERPL-SCNC: 25 MMOL/L (ref 22–29)
CREAT SERPL-MCNC: 1.05 MG/DL (ref 0.57–1)
DEPRECATED RDW RBC AUTO: 49 FL (ref 37–54)
EGFRCR SERPLBLD CKD-EPI 2021: 52.5 ML/MIN/1.73
ERYTHROCYTE [DISTWIDTH] IN BLOOD BY AUTOMATED COUNT: 14.7 % (ref 12.3–15.4)
GLOBULIN UR ELPH-MCNC: 3.1 GM/DL
GLUCOSE SERPL-MCNC: 142 MG/DL (ref 65–99)
HCT VFR BLD AUTO: 42.8 % (ref 34–46.6)
HGB BLD-MCNC: 13.1 G/DL (ref 12–15.9)
MCH RBC QN AUTO: 28 PG (ref 26.6–33)
MCHC RBC AUTO-ENTMCNC: 30.6 G/DL (ref 31.5–35.7)
MCV RBC AUTO: 91.5 FL (ref 79–97)
PLATELET # BLD AUTO: 189 10*3/MM3 (ref 140–450)
PMV BLD AUTO: 10.7 FL (ref 6–12)
POTASSIUM SERPL-SCNC: 4.5 MMOL/L (ref 3.5–5.2)
PROT SERPL-MCNC: 7.2 G/DL (ref 6–8.5)
RBC # BLD AUTO: 4.68 10*6/MM3 (ref 3.77–5.28)
SODIUM SERPL-SCNC: 140 MMOL/L (ref 136–145)
T4 FREE SERPL-MCNC: 1.33 NG/DL (ref 0.92–1.68)
TSH SERPL DL<=0.05 MIU/L-ACNC: 1.69 UIU/ML (ref 0.27–4.2)
WBC NRBC COR # BLD AUTO: 8.38 10*3/MM3 (ref 3.4–10.8)

## 2025-02-26 PROCEDURE — 80053 COMPREHEN METABOLIC PANEL: CPT

## 2025-02-26 PROCEDURE — 84439 ASSAY OF FREE THYROXINE: CPT

## 2025-02-26 PROCEDURE — 80151 DRUG ASSAY AMIODARONE: CPT

## 2025-02-26 PROCEDURE — 71046 X-RAY EXAM CHEST 2 VIEWS: CPT

## 2025-02-26 PROCEDURE — 85027 COMPLETE CBC AUTOMATED: CPT

## 2025-02-26 PROCEDURE — 36415 COLL VENOUS BLD VENIPUNCTURE: CPT

## 2025-02-26 PROCEDURE — 84443 ASSAY THYROID STIM HORMONE: CPT

## 2025-02-27 ENCOUNTER — TELEPHONE (OUTPATIENT)
Age: 85
End: 2025-02-27
Payer: MEDICARE

## 2025-02-27 NOTE — PROGRESS NOTES
Please let her know labs look good. Her kidney function is better.  One of her liver enzymes was mildly elevated which we will check again on repeat labs in a couple months.  Otherwise her thyroid was normal as well as blood counts

## 2025-02-27 NOTE — TELEPHONE ENCOUNTER
----- Message from Hemal Lakhani sent at 2/27/2025  1:47 PM EST -----  Please let her know labs look good. Her kidney function is better.  One of her liver enzymes was mildly elevated which we will check again on repeat labs in a couple months.  Otherwise her thyroid was normal as well as blood counts

## 2025-03-04 ENCOUNTER — TELEPHONE (OUTPATIENT)
Age: 85
End: 2025-03-04
Payer: MEDICARE

## 2025-03-04 LAB
AMIODARONE SERPL-MCNC: 1569 NG/ML (ref 1000–2500)
DESETHYLAMIODARONE SERPL-MCNC: 792 NG/ML

## 2025-03-04 NOTE — TELEPHONE ENCOUNTER
----- Message from Hemal Lakhani sent at 3/4/2025 12:48 PM EST -----  Please let her know amiodarone level looks good thanks!

## 2025-03-18 ENCOUNTER — OFFICE VISIT (OUTPATIENT)
Age: 85
End: 2025-03-18
Payer: MEDICARE

## 2025-03-18 VITALS
SYSTOLIC BLOOD PRESSURE: 132 MMHG | DIASTOLIC BLOOD PRESSURE: 86 MMHG | HEIGHT: 65 IN | WEIGHT: 148 LBS | BODY MASS INDEX: 24.66 KG/M2

## 2025-03-18 DIAGNOSIS — I48.19 ATRIAL FIBRILLATION, PERSISTENT: Primary | ICD-10-CM

## 2025-03-18 PROCEDURE — 99214 OFFICE O/P EST MOD 30 MIN: CPT | Performed by: INTERNAL MEDICINE

## 2025-03-18 PROCEDURE — 93000 ELECTROCARDIOGRAM COMPLETE: CPT | Performed by: INTERNAL MEDICINE

## 2025-03-18 NOTE — PROGRESS NOTES
"Date of Office Visit: 2025  Encounter Provider: Arden Mayorga MD  Place of Service: [facility] CARDIOLOGY  Patient Name: Danuta Batista  :1940    Chief Complaint   Patient presents with    Atrial Fibrillation     6 mnth follow up / amio therapy   :     HPI: Danuta Batista is a 84 y.o. female who presents today for persistent atrial fibrillation.     She has not had any recent atrial fibrillation symptoms.     She has not been noted to be in atrial fibrillation recently.      She remains on amiodarone 200 mg daily.  Recent TSH, and CMP unremarkable, except Alk Phos was elevated slightly.           Past Medical History:   Diagnosis Date    Atrial fibrillation     Bladder infection, chronic     Hyperlipidemia     Hypertension     Kidney stone        Past Surgical History:   Procedure Laterality Date    CAROTID ENDARTERECTOMY      left side    DENTAL PROCEDURE         Social History     Socioeconomic History    Marital status:    Tobacco Use    Smoking status: Never     Passive exposure: Never    Smokeless tobacco: Never    Tobacco comments:     caff use   Vaping Use    Vaping status: Never Used   Substance and Sexual Activity    Alcohol use: No    Drug use: No    Sexual activity: Defer       Family History   Problem Relation Age of Onset    Stroke Father     Hypertension Sister     Stroke Sister     Hypertension Sister     Cancer Sister     Heart disease Brother 77    Hypertension Brother     Cancer Brother     Breast cancer Neg Hx        Review of Systems   Constitutional: Negative.   Cardiovascular: Negative.    Respiratory: Negative.     Gastrointestinal: Negative.        Allergies   Allergen Reactions    Penicillins Other (See Comments)     \"Makes me numb\"    Sulfa Antibiotics Unknown (See Comments)     \"it's been years ago and I don't remember\"    Ciprofloxacin Anxiety         Current Outpatient Medications:     amiodarone (PACERONE) 200 MG tablet, TAKE 1 TABLET BY " "MOUTH DAILY, Disp: 90 tablet, Rfl: 1    amLODIPine (NORVASC) 10 MG tablet, Take 1 tablet by mouth Daily., Disp: , Rfl:     apixaban (ELIQUIS) 2.5 MG tablet tablet, Take 1 tablet by mouth Every 12 (Twelve) Hours. Indications: Atrial Fibrillation, Disp: 180 tablet, Rfl: 2    atorvastatin (LIPITOR) 10 MG tablet, Take 1 tablet by mouth Daily., Disp: , Rfl:     cloNIDine (CATAPRES) 0.2 MG tablet, Take 1 tablet by mouth 2 (Two) Times a Day., Disp: , Rfl:     clopidogrel (PLAVIX) 75 MG tablet, Take 1 tablet by mouth Daily., Disp: , Rfl:     furosemide (LASIX) 40 MG tablet, TAKE 1 TABLET BY MOUTH DAILY, Disp: 90 tablet, Rfl: 3    Gemtesa 75 MG tablet, Take 1 tablet by mouth Daily., Disp: , Rfl:     metFORMIN (GLUCOPHAGE) 500 MG tablet, Take 1 tablet by mouth 2 (Two) Times a Day With Meals., Disp: , Rfl:     metoprolol succinate XL (TOPROL-XL) 25 MG 24 hr tablet, Take 1 tablet by mouth Daily., Disp: 60 tablet, Rfl: 5    Multiple Vitamins-Minerals (MULTI-COOKIE PO), Take 1 tablet by mouth Daily., Disp: , Rfl:     sodium bicarbonate 650 MG tablet, Take 1 tablet by mouth 3 (Three) Times a Day., Disp: , Rfl:     valsartan (DIOVAN) 320 MG tablet, Take 1 tablet by mouth Daily., Disp: , Rfl:       Objective:     Vitals:    03/18/25 1105   BP: 132/86   BP Location: Right arm   Patient Position: Sitting   Cuff Size: Adult   Weight: 67.1 kg (148 lb)   Height: 165.1 cm (65\")     Body mass index is 24.63 kg/m².    PHYSICAL EXAM:    Vitals and nursing note reviewed.   Constitutional:       Appearance: Not in distress. Frail.   Cardiovascular:      Normal rate. Regular rhythm.      Murmurs: There is no murmur.   Edema:     Peripheral edema absent.             ECG 12 Lead    Date/Time: 3/18/2025 3:28 PM  Performed by: Arden Mayorga MD    Authorized by: Arden Mayorga MD  Comparison: compared with previous ECG from 9/10/2024  Similar to previous ECG  Rhythm: sinus rhythm            Assessment:       Diagnosis Plan   1. Atrial " fibrillation, persistent               Plan:       Persistent atrial fibrillation.  She is maintaining sinus rhythm on amiodarone.  Labs have been good, and she is experiencing no side effects.  We discussed and decided to continue efforts at rhythm control.  Follow-up in 6 months with repeat labs.     As always, it has been a pleasure to participate in your patient's care.      Sincerely,         Arden Mayorga MD

## 2025-04-14 ENCOUNTER — OFFICE VISIT (OUTPATIENT)
Dept: ORTHOPEDIC SURGERY | Facility: CLINIC | Age: 85
End: 2025-04-14
Payer: MEDICARE

## 2025-04-14 DIAGNOSIS — M17.11 PRIMARY OSTEOARTHRITIS OF RIGHT KNEE: ICD-10-CM

## 2025-04-14 DIAGNOSIS — M17.0 PRIMARY OSTEOARTHRITIS OF KNEES, BILATERAL: Primary | ICD-10-CM

## 2025-04-14 PROCEDURE — 1159F MED LIST DOCD IN RCRD: CPT | Performed by: NURSE PRACTITIONER

## 2025-04-14 PROCEDURE — 1160F RVW MEDS BY RX/DR IN RCRD: CPT | Performed by: NURSE PRACTITIONER

## 2025-04-14 PROCEDURE — 20610 DRAIN/INJ JOINT/BURSA W/O US: CPT | Performed by: NURSE PRACTITIONER

## 2025-04-14 RX ORDER — LIDOCAINE HYDROCHLORIDE 10 MG/ML
8 INJECTION, SOLUTION EPIDURAL; INFILTRATION; INTRACAUDAL; PERINEURAL
Status: COMPLETED | OUTPATIENT
Start: 2025-04-14 | End: 2025-04-14

## 2025-04-14 RX ORDER — TRIAMCINOLONE ACETONIDE 40 MG/ML
80 INJECTION, SUSPENSION INTRA-ARTICULAR; INTRAMUSCULAR
Status: COMPLETED | OUTPATIENT
Start: 2025-04-14 | End: 2025-04-14

## 2025-04-14 RX ADMIN — TRIAMCINOLONE ACETONIDE 80 MG: 40 INJECTION, SUSPENSION INTRA-ARTICULAR; INTRAMUSCULAR at 11:16

## 2025-04-14 RX ADMIN — LIDOCAINE HYDROCHLORIDE 8 ML: 10 INJECTION, SOLUTION EPIDURAL; INFILTRATION; INTRACAUDAL; PERINEURAL at 11:16

## 2025-04-14 NOTE — PROGRESS NOTES
Subjective:     Patient ID: Danuta Batista is a 84 y.o. female.    Chief Complaint:  Follow-up DJD bilateral knees   History of Present Illness  History of Present Illness  The patient is an 84-year-old female who presents to the clinic today for evaluation of bilateral knee pain. She is accompanied by her daughter.    She received corticosteroid injections previously, which did seem to help with symptom improvement. She last received a corticosteroid injection 3 months ago, which provided symptom relief. She reports experiencing pain with extension and ambulatory activities in both knees. She does report a recent fall related to her knees, resulting in injury to her right ankle and foot, but notes that these injuries seem to be improving. She does not have any other concerns at present.         Social History     Occupational History    Not on file   Tobacco Use    Smoking status: Never     Passive exposure: Never    Smokeless tobacco: Never    Tobacco comments:     caff use   Vaping Use    Vaping status: Never Used   Substance and Sexual Activity    Alcohol use: No    Drug use: No    Sexual activity: Defer      Past Medical History:   Diagnosis Date    Atrial fibrillation     Bladder infection, chronic     Hyperlipidemia     Hypertension     Kidney stone      Past Surgical History:   Procedure Laterality Date    CAROTID ENDARTERECTOMY      left side    DENTAL PROCEDURE         Family History   Problem Relation Age of Onset    Stroke Father     Hypertension Sister     Stroke Sister     Hypertension Sister     Cancer Sister     Heart disease Brother 77    Hypertension Brother     Cancer Brother     Breast cancer Neg Hx                Objective:  Physical Exam  General: No acute distress.  Eyes: conjunctiva clear; pupils equally round and reactive  ENT: external ears and nose atraumatic; oropharynx clear  CV: no peripheral edema  Resp: normal respiratory effort  Skin: no rashes or wounds; normal turgor  Psych: mood and  affect appropriate; recent and remote memory intact    There were no vitals filed for this visit.  There were no vitals filed for this visit.  There is no height or weight on file to calculate BMI.      Ortho Exam     Physical Exam  Bilateral knees exam  Knee range of motion lacking 2 degrees extension to 120 degrees flexion  Stable to varus and valgus stress at 2 degrees and 30 degrees  1+ anterior Lachman exam  Negative anterior posterior drawer exam  Positive crepitus arc of motion  Moderate swelling, negative effusion  Positive active patellar compression test    Assessment:        1. Primary osteoarthritis of knees, bilateral    2. Primary osteoarthritis of right knee         Assessment & Plan  1. Bilateral knee pain.  A comprehensive discussion was held with the patient and her family regarding potential treatment strategies. She expressed a desire to proceed with repeat corticosteroid injections in both knees. It is recommended to apply ice to the injection site this evening. The injections can be repeated at a 3-month interval. Additionally, the use of a lift mechanism for a chair at home was discussed and recommended, along with ramps to facilitate safe entry and exit from her home. Any other safety measures that can be implemented were also recommended. All questions were addressed during this visit.    PROCEDURE  The patient received corticosteroid injections in the past, with the last one administered 3 months ago.    Plan:  - Large Joint Arthrocentesis: bilateral knee on 4/14/2025 11:16 AM  Indications: pain  Details: 22 G needle, anterolateral approach  Medications (Right): 80 mg triamcinolone acetonide 40 MG/ML; 8 mL lidocaine PF 1% 1 %  Medications (Left): 80 mg triamcinolone acetonide 40 MG/ML; 8 mL lidocaine PF 1% 1 %  Outcome: tolerated well, no immediate complications  Procedure, treatment alternatives, risks and benefits explained, specific risks discussed. Consent was given by the patient.  Immediately prior to procedure a time out was called to verify the correct patient, procedure, equipment, support staff and site/side marked as required. Patient was prepped and draped in the usual sterile fashion.           Orders:  Orders Placed This Encounter   Procedures    - Large Joint Arthrocentesis: bilateral knee     No orders of the defined types were placed in this encounter.        Dragon dictation utilized  BMI is within normal parameters. No other follow-up for BMI required.       Patient or patient representative verbalized consent for the use of Ambient Listening during the visit with  DEVONTE Hensley for chart documentation. 4/14/2025  11:29 EDT

## 2025-04-24 ENCOUNTER — HOSPITAL ENCOUNTER (OUTPATIENT)
Dept: ULTRASOUND IMAGING | Facility: HOSPITAL | Age: 85
Discharge: HOME | End: 2025-04-24
Admitting: UROLOGY
Payer: MEDICARE

## 2025-04-24 DIAGNOSIS — N20.0 RENAL CALCULUS: ICD-10-CM

## 2025-04-24 PROCEDURE — 76775 US EXAM ABDO BACK WALL LIM: CPT

## 2025-05-08 ENCOUNTER — LAB (OUTPATIENT)
Dept: LAB | Facility: HOSPITAL | Age: 85
End: 2025-05-08
Payer: MEDICARE

## 2025-05-08 ENCOUNTER — TRANSCRIBE ORDERS (OUTPATIENT)
Dept: ADMINISTRATIVE | Facility: HOSPITAL | Age: 85
End: 2025-05-08
Payer: MEDICARE

## 2025-05-08 DIAGNOSIS — R73.01 IMPAIRED FASTING GLUCOSE: ICD-10-CM

## 2025-05-08 DIAGNOSIS — D69.6 THROMBOCYTOPENIA, UNSPECIFIED: ICD-10-CM

## 2025-05-08 DIAGNOSIS — E78.5 HYPERLIPIDEMIA, UNSPECIFIED HYPERLIPIDEMIA TYPE: ICD-10-CM

## 2025-05-08 DIAGNOSIS — I10 ESSENTIAL HYPERTENSION, MALIGNANT: ICD-10-CM

## 2025-05-08 DIAGNOSIS — E55.9 AVITAMINOSIS D: ICD-10-CM

## 2025-05-08 DIAGNOSIS — Z79.899 LONG TERM CURRENT USE OF AMIODARONE: ICD-10-CM

## 2025-05-08 DIAGNOSIS — I48.19 ATRIAL FIBRILLATION, PERSISTENT: ICD-10-CM

## 2025-05-08 DIAGNOSIS — I10 ESSENTIAL HYPERTENSION, MALIGNANT: Primary | ICD-10-CM

## 2025-05-08 LAB
25(OH)D3 SERPL-MCNC: 30.6 NG/ML (ref 30–100)
ALBUMIN SERPL-MCNC: 4.3 G/DL (ref 3.5–5.2)
ALBUMIN/GLOB SERPL: 1.6 G/DL
ALP SERPL-CCNC: 106 U/L (ref 39–117)
ALT SERPL W P-5'-P-CCNC: 34 U/L (ref 1–33)
ANION GAP SERPL CALCULATED.3IONS-SCNC: 12.7 MMOL/L (ref 5–15)
AST SERPL-CCNC: 20 U/L (ref 1–32)
BILIRUB SERPL-MCNC: 0.6 MG/DL (ref 0–1.2)
BUN SERPL-MCNC: 20 MG/DL (ref 8–23)
BUN/CREAT SERPL: 17.7 (ref 7–25)
CALCIUM SPEC-SCNC: 10.1 MG/DL (ref 8.6–10.5)
CHLORIDE SERPL-SCNC: 98 MMOL/L (ref 98–107)
CHOLEST SERPL-MCNC: 184 MG/DL (ref 0–200)
CO2 SERPL-SCNC: 25.3 MMOL/L (ref 22–29)
CREAT SERPL-MCNC: 1.13 MG/DL (ref 0.57–1)
DEPRECATED RDW RBC AUTO: 50.1 FL (ref 37–54)
EGFRCR SERPLBLD CKD-EPI 2021: 48.1 ML/MIN/1.73
ERYTHROCYTE [DISTWIDTH] IN BLOOD BY AUTOMATED COUNT: 15.4 % (ref 12.3–15.4)
GLOBULIN UR ELPH-MCNC: 2.7 GM/DL
GLUCOSE SERPL-MCNC: 170 MG/DL (ref 65–99)
HBA1C MFR BLD: 8.4 % (ref 4.8–5.6)
HCT VFR BLD AUTO: 45.2 % (ref 34–46.6)
HDLC SERPL-MCNC: 38 MG/DL (ref 40–60)
HGB BLD-MCNC: 14.7 G/DL (ref 12–15.9)
LDLC SERPL CALC-MCNC: 97 MG/DL (ref 0–100)
LDLC/HDLC SERPL: 2.3 {RATIO}
MCH RBC QN AUTO: 29.1 PG (ref 26.6–33)
MCHC RBC AUTO-ENTMCNC: 32.5 G/DL (ref 31.5–35.7)
MCV RBC AUTO: 89.5 FL (ref 79–97)
PLATELET # BLD AUTO: 172 10*3/MM3 (ref 140–450)
PMV BLD AUTO: 10.8 FL (ref 6–12)
POTASSIUM SERPL-SCNC: 4.5 MMOL/L (ref 3.5–5.2)
PROT SERPL-MCNC: 7 G/DL (ref 6–8.5)
RBC # BLD AUTO: 5.05 10*6/MM3 (ref 3.77–5.28)
SODIUM SERPL-SCNC: 136 MMOL/L (ref 136–145)
T4 FREE SERPL-MCNC: 1.53 NG/DL (ref 0.92–1.68)
TRIGL SERPL-MCNC: 293 MG/DL (ref 0–150)
TSH SERPL DL<=0.05 MIU/L-ACNC: 2.15 UIU/ML (ref 0.27–4.2)
VLDLC SERPL-MCNC: 49 MG/DL (ref 5–40)
WBC NRBC COR # BLD AUTO: 8.58 10*3/MM3 (ref 3.4–10.8)

## 2025-05-08 PROCEDURE — 84439 ASSAY OF FREE THYROXINE: CPT

## 2025-05-08 PROCEDURE — 80061 LIPID PANEL: CPT

## 2025-05-08 PROCEDURE — 84443 ASSAY THYROID STIM HORMONE: CPT

## 2025-05-08 PROCEDURE — 80151 DRUG ASSAY AMIODARONE: CPT

## 2025-05-08 PROCEDURE — 83036 HEMOGLOBIN GLYCOSYLATED A1C: CPT

## 2025-05-08 PROCEDURE — 80053 COMPREHEN METABOLIC PANEL: CPT

## 2025-05-08 PROCEDURE — 85027 COMPLETE CBC AUTOMATED: CPT

## 2025-05-08 PROCEDURE — 82306 VITAMIN D 25 HYDROXY: CPT

## 2025-05-08 PROCEDURE — 36415 COLL VENOUS BLD VENIPUNCTURE: CPT

## 2025-05-08 RX ORDER — AMIODARONE HYDROCHLORIDE 200 MG/1
200 TABLET ORAL DAILY
Qty: 90 TABLET | Refills: 1 | Status: SHIPPED | OUTPATIENT
Start: 2025-05-08

## 2025-05-15 ENCOUNTER — TRANSCRIBE ORDERS (OUTPATIENT)
Dept: ADMINISTRATIVE | Facility: HOSPITAL | Age: 85
End: 2025-05-15
Payer: MEDICARE

## 2025-05-15 ENCOUNTER — LAB (OUTPATIENT)
Dept: LAB | Facility: HOSPITAL | Age: 85
End: 2025-05-15
Payer: MEDICARE

## 2025-05-15 DIAGNOSIS — N30.20 BLADDER INFECTION, CHRONIC: ICD-10-CM

## 2025-05-15 DIAGNOSIS — N28.1 ACQUIRED CYST OF KIDNEY: ICD-10-CM

## 2025-05-15 DIAGNOSIS — N20.0 URIC ACID NEPHROLITHIASIS: ICD-10-CM

## 2025-05-15 DIAGNOSIS — N20.0 URIC ACID NEPHROLITHIASIS: Primary | ICD-10-CM

## 2025-05-15 LAB
ALBUMIN SERPL-MCNC: 3.7 G/DL (ref 3.5–5.2)
ALBUMIN/GLOB SERPL: 1.6 G/DL
ALP SERPL-CCNC: 81 U/L (ref 39–117)
ALT SERPL W P-5'-P-CCNC: 32 U/L (ref 1–33)
ANION GAP SERPL CALCULATED.3IONS-SCNC: 11.1 MMOL/L (ref 5–15)
AST SERPL-CCNC: 17 U/L (ref 1–32)
BILIRUB SERPL-MCNC: 0.4 MG/DL (ref 0–1.2)
BUN SERPL-MCNC: 23 MG/DL (ref 8–23)
BUN/CREAT SERPL: 20 (ref 7–25)
CALCIUM SPEC-SCNC: 9.8 MG/DL (ref 8.6–10.5)
CHLORIDE SERPL-SCNC: 103 MMOL/L (ref 98–107)
CO2 SERPL-SCNC: 25.9 MMOL/L (ref 22–29)
CREAT SERPL-MCNC: 1.15 MG/DL (ref 0.57–1)
EGFRCR SERPLBLD CKD-EPI 2021: 46.8 ML/MIN/1.73
GLOBULIN UR ELPH-MCNC: 2.3 GM/DL
GLUCOSE SERPL-MCNC: 184 MG/DL (ref 65–99)
POTASSIUM SERPL-SCNC: 4.8 MMOL/L (ref 3.5–5.2)
PROT SERPL-MCNC: 6 G/DL (ref 6–8.5)
SODIUM SERPL-SCNC: 140 MMOL/L (ref 136–145)

## 2025-05-15 PROCEDURE — 36415 COLL VENOUS BLD VENIPUNCTURE: CPT

## 2025-05-15 PROCEDURE — 80053 COMPREHEN METABOLIC PANEL: CPT

## 2025-05-16 LAB
AMIODARONE SERPL-MCNC: 1299 NG/ML (ref 1000–2500)
DESETHYLAMIODARONE SERPL-MCNC: 630 NG/ML

## 2025-06-12 DIAGNOSIS — I48.19 ATRIAL FIBRILLATION, PERSISTENT: ICD-10-CM

## 2025-06-12 RX ORDER — APIXABAN 2.5 MG/1
TABLET, FILM COATED ORAL
Qty: 180 TABLET | Refills: 2 | Status: SHIPPED | OUTPATIENT
Start: 2025-06-12

## 2025-07-28 ENCOUNTER — OFFICE VISIT (OUTPATIENT)
Dept: ORTHOPEDIC SURGERY | Facility: CLINIC | Age: 85
End: 2025-07-28
Payer: MEDICARE

## 2025-07-28 VITALS — BODY MASS INDEX: 24.66 KG/M2 | HEIGHT: 65 IN | WEIGHT: 148 LBS

## 2025-07-28 DIAGNOSIS — M17.0 PRIMARY OSTEOARTHRITIS OF KNEES, BILATERAL: Primary | ICD-10-CM

## 2025-07-28 PROCEDURE — 20610 DRAIN/INJ JOINT/BURSA W/O US: CPT | Performed by: NURSE PRACTITIONER

## 2025-07-28 RX ADMIN — TRIAMCINOLONE ACETONIDE 80 MG: 40 INJECTION, SUSPENSION INTRA-ARTICULAR; INTRAMUSCULAR at 11:07

## 2025-07-28 RX ADMIN — LIDOCAINE HYDROCHLORIDE 8 ML: 10 INJECTION, SOLUTION EPIDURAL; INFILTRATION; INTRACAUDAL; PERINEURAL at 11:07

## 2025-07-28 NOTE — PROGRESS NOTES
"Subjective:     Patient ID: Danuta Batista is a 85 y.o. female.    Chief Complaint:  Follow-up DJD bilateral knees  History of Present Illness  History of Present Illness  The patient is an 85-year-old female who presents to the clinic today for evaluation of bilateral knees. She is accompanied by her daughter.    She has been faring well over the past 3 months, with her daughter noting improved stability when navigating stairs. She currently uses a walker for mobility both within and outside her home. The injections she received have noticeably alleviated her symptoms. She reports no other concerns at this time.       Social History     Occupational History    Not on file   Tobacco Use    Smoking status: Never     Passive exposure: Never    Smokeless tobacco: Never    Tobacco comments:     caff use   Vaping Use    Vaping status: Never Used   Substance and Sexual Activity    Alcohol use: No    Drug use: No    Sexual activity: Defer      Past Medical History:   Diagnosis Date    Atrial fibrillation     Bladder infection, chronic     Hyperlipidemia     Hypertension     Kidney stone      Past Surgical History:   Procedure Laterality Date    CAROTID ENDARTERECTOMY      left side    DENTAL PROCEDURE         Family History   Problem Relation Age of Onset    Stroke Father     Hypertension Sister     Stroke Sister     Hypertension Sister     Cancer Sister     Heart disease Brother 77    Hypertension Brother     Cancer Brother     Breast cancer Neg Hx                Objective:  Physical Exam    General: No acute distress.  Eyes: conjunctiva clear; pupils equally round and reactive  ENT: external ears and nose atraumatic; oropharynx clear  CV: no peripheral edema  Resp: normal respiratory effort  Skin: no rashes or wounds; normal turgor  Psych: mood and affect appropriate; recent and remote memory intact    Vitals:    07/28/25 1056   Weight: 67.1 kg (148 lb)   Height: 165.1 cm (65\")         07/28/25  1056   Weight: 67.1 kg (148 " lb)     Body mass index is 24.63 kg/m².      Ortho Exam     Physical Exam  Bilateral knees were examined. Knee range of motion is 0 to 125 degrees. Stability to varus and valgus stress is 0 degrees and 30 degrees. There is 1+ anterior Lachman exam, negative anterior, negative posterior drawer exam, positive crepitus, positive active patellar compression test, moderate swelling, negative effusion. Quad strength in right leg is 4 out of 5, quad strength in left leg is 3+ out of 5.      Assessment:        1. Primary osteoarthritis of knees, bilateral         Assessment & Plan  1. Bilateral knee pain:  The patient has experienced significant symptom improvement from previous injections and has been more stable while navigating stairs. She is currently using a walker both inside and outside her home. Physical examination revealed knee range of motion from 0 to 125 degrees, stability to varus and valgus stress at 0 degrees and 30 degrees, 1+ anterior Lachman exam, negative anterior and posterior drawer exams, positive crepitus, positive active patellar compression test, moderate swelling, and negative effusion. Quad strength was 4 out of 5 in the right leg and 3+ out of 5 in the left leg.    The patient wishes to proceed with repeat corticosteroid injections in both knees. It is recommended to apply ice to the injection site this evening. A follow-up appointment is scheduled in 3 months to repeat the injections. All questions were answered.    Follow-up: 3 months.    PROCEDURE  Corticosteroid injections were administered in both knees.    Plan:    - Large Joint Arthrocentesis: bilateral knee on 7/28/2025 11:07 AM  Indications: pain  Details: 22 G needle, anterolateral approach  Medications (Right): 80 mg triamcinolone acetonide 40 MG/ML; 8 mL lidocaine PF 1% 1 %  Medications (Left): 8 mL lidocaine PF 1% 1 %; 80 mg triamcinolone acetonide 40 MG/ML  Outcome: tolerated well, no immediate complications  Procedure, treatment  alternatives, risks and benefits explained, specific risks discussed. Consent was given by the patient. Immediately prior to procedure a time out was called to verify the correct patient, procedure, equipment, support staff and site/side marked as required. Patient was prepped and draped in the usual sterile fashion.           Orders:  Orders Placed This Encounter   Procedures    - Large Joint Arthrocentesis: bilateral knee     No orders of the defined types were placed in this encounter.          Dragon dictation utilized  BMI is within normal parameters. No other follow-up for BMI required.       Patient or patient representative verbalized consent for the use of Ambient Listening during the visit with  DEVONTE Hensley for chart documentation. 7/29/2025  07:50 EDT

## 2025-07-29 RX ORDER — TRIAMCINOLONE ACETONIDE 40 MG/ML
80 INJECTION, SUSPENSION INTRA-ARTICULAR; INTRAMUSCULAR
Status: COMPLETED | OUTPATIENT
Start: 2025-07-28 | End: 2025-07-28

## 2025-07-29 RX ORDER — LIDOCAINE HYDROCHLORIDE 10 MG/ML
8 INJECTION, SOLUTION EPIDURAL; INFILTRATION; INTRACAUDAL; PERINEURAL
Status: COMPLETED | OUTPATIENT
Start: 2025-07-28 | End: 2025-07-28

## 2025-08-15 ENCOUNTER — TELEPHONE (OUTPATIENT)
Dept: CARDIOLOGY | Age: 85
End: 2025-08-15
Payer: MEDICARE